# Patient Record
Sex: FEMALE | Employment: OTHER | URBAN - METROPOLITAN AREA
[De-identification: names, ages, dates, MRNs, and addresses within clinical notes are randomized per-mention and may not be internally consistent; named-entity substitution may affect disease eponyms.]

---

## 2017-01-16 ENCOUNTER — OFFICE VISIT (OUTPATIENT)
Dept: ORTHOPEDIC SURGERY | Age: 82
End: 2017-01-16

## 2017-01-16 VITALS
HEIGHT: 56 IN | HEART RATE: 77 BPM | DIASTOLIC BLOOD PRESSURE: 50 MMHG | SYSTOLIC BLOOD PRESSURE: 134 MMHG | WEIGHT: 128.2 LBS | BODY MASS INDEX: 28.84 KG/M2 | RESPIRATION RATE: 18 BRPM

## 2017-01-16 DIAGNOSIS — M47.812 SPONDYLOSIS WITHOUT MYELOPATHY OR RADICULOPATHY, CERVICAL REGION: ICD-10-CM

## 2017-01-16 DIAGNOSIS — M51.24 OTHER INTERVERTEBRAL DISC DISPLACEMENT, THORACIC REGION: ICD-10-CM

## 2017-01-16 DIAGNOSIS — M50.20 OTHER CERVICAL DISC DISPLACEMENT, UNSPECIFIED CERVICAL REGION: Primary | ICD-10-CM

## 2017-01-16 DIAGNOSIS — M51.26 OTHER INTERVERTEBRAL DISC DISPLACEMENT, LUMBAR REGION: ICD-10-CM

## 2017-01-16 RX ORDER — NITROFURANTOIN 25; 75 MG/1; MG/1
CAPSULE ORAL
Refills: 0 | COMMUNITY
Start: 2016-11-07 | End: 2017-04-12 | Stop reason: ALTCHOICE

## 2017-01-16 RX ORDER — NAPROXEN SODIUM 220 MG
220 TABLET ORAL 2 TIMES DAILY WITH MEALS
COMMUNITY
End: 2017-02-01

## 2017-01-16 RX ORDER — PHENAZOPYRIDINE HYDROCHLORIDE 100 MG/1
TABLET, FILM COATED ORAL
Refills: 0 | COMMUNITY
Start: 2016-10-25 | End: 2017-02-01

## 2017-01-16 RX ORDER — CONJUGATED ESTROGENS 0.62 MG/G
CREAM VAGINAL
Refills: 0 | COMMUNITY
Start: 2016-10-25 | End: 2018-12-03 | Stop reason: SDUPTHER

## 2017-01-16 RX ORDER — DULOXETIN HYDROCHLORIDE 20 MG/1
20 CAPSULE, DELAYED RELEASE ORAL DAILY
Qty: 30 CAP | Refills: 1 | Status: SHIPPED | OUTPATIENT
Start: 2017-01-16 | End: 2017-02-01

## 2017-01-16 RX ORDER — AMOXICILLIN AND CLAVULANATE POTASSIUM 500; 125 MG/1; MG/1
TABLET, FILM COATED ORAL
Refills: 0 | COMMUNITY
Start: 2016-12-21 | End: 2018-12-03

## 2017-01-16 NOTE — MR AVS SNAPSHOT
Visit Information Date & Time Provider Department Dept. Phone Encounter #  
 1/16/2017  1:00 PM Frances Montalvo MD [address] [facility] [place] [phone] [id number] Follow-up Instructions Return in about 4 weeks (around 2/13/2017). Your Appointments 2/1/2017 12:45 PM  
Office Visit with MD RUDOLPH [facility]) Appt Note: 3 month f/u  
 [address]  
  
   
 [address] [facility] Upcoming Health Maintenance Date Due DTaP/Tdap/Td series (1 - Tdap) 4/8/1947 ZOSTER VACCINE AGE 60> 4/8/1986 GLAUCOMA SCREENING Q2Y 4/8/1991 OSTEOPOROSIS SCREENING (DEXA) 4/8/1991 Pneumococcal 65+ Low/Medium Risk (1 of 2 - PCV13) 4/8/1991 MEDICARE YEARLY EXAM 4/8/1991 Allergies as of 1/16/2017  Review Complete On: 1/16/2017 By: Frances Montalvo MD  
  
 Severity Noted Reaction Type Reactions Novocain [Procaine]  07/21/2016    Swelling Sulfa (Sulfonamide Antibiotics)  07/21/2016    Rash Current Immunizations  Never Reviewed Name Date Influenza High Dose Vaccine PF 10/7/2016 Not reviewed this visit You Were Diagnosed With   
  
 Codes Comments Other cervical disc displacement, unspecified cervical region    -  Primary ICD-10-CM: M50.20 ICD-9-CM: 722.0 Other intervertebral disc displacement, thoracic region     ICD-10-CM: M51.24 
ICD-9-CM: 722.11 Other intervertebral disc displacement, lumbar region     ICD-10-CM: M51.26 
ICD-9-CM: 722.10 Spondylosis without myelopathy or radiculopathy, cervical region     ICD-10-CM: M47.812 ICD-9-CM: 721.0 Vitals BP Pulse Resp Height(growth percentile) Weight(growth percentile) BMI  
 134/50 (BP 1 Location: Left arm, BP Patient Position: Sitting) 77 18 4' 8\" (1.422 m) 128 lb 3.2 oz (58.2 kg) 28.74 kg/m2 Smoking Status Former Smoker BMI and BSA Data Body Mass Index Body Surface Area 28.74 kg/m 2 1.52 m 2 Preferred Pharmacy Pharmacy Name Phone [facility]Y #[id number] Juan Jose Gomes, [address] [facility] [facility] [phone] Your Updated Medication List  
  
   
This list is accurate as of: 1/16/17  2:02 PM.  Always use your most recent med list.  
  
  
  
  
 ALEVE 220 mg tablet Generic drug:  naproxen sodium Take 220 mg by mouth two (2) times daily (with meals). amoxicillin-clavulanate 500-125 mg per tablet Commonly known as:  AUGMENTIN  
TAKE 1/2 TABLET BY MOUTH ONCE A DAY AS DIRECTED FOR 90 DAYS. aspirin delayed-release 81 mg tablet Take  by mouth daily. CALTRATE 600 PO Take  by mouth. carvedilol 3.125 mg tablet Commonly known as:  Gillermina Begun Take 3.125 mg by mouth two (2) times daily (with meals). CENTRUM SILVER PO Take  by mouth. clopidogrel 75 mg Tab Commonly known as:  PLAVIX Take 75 mg by mouth daily. DULoxetine 20 mg capsule Commonly known as:  CYMBALTA Take 1 Cap by mouth daily. DYMISTA 137-50 mcg/spray [place] Generic drug:  azelastine-fluticasone INSTILL 1 SPRAY TO EACH NOSTRIL TWICE A DAY  
  
 FIBERCON PO Take  by mouth. FLUZONE HIGH-DOSE 2016-17 (PF) Syrg injection Generic drug:  influenza vaccine 2016-17 (65 yr+)(PF)  
TO BE ADMINISTERED BY PHARMACIST FOR IMMUNIZATION NexIUM 40 mg capsule Generic drug:  esomeprazole Take 40 mg by mouth daily. nitrofurantoin (macrocrystal-monohydrate) 100 mg capsule Commonly known as:  MACROBID  
TAKE 1 TABLET BY MOUTH TWICE A DAY FOR 7 DAYS. NITROSTAT 0.4 mg SL tablet Generic drug:  nitroglycerin 0.4 mg by SubLINGual route every five (5) minutes as needed. phenazopyridine 100 mg tablet Commonly known as:  PYRIDIUM  
TAKE 1 TABLET BY MOUTH ONCE A DAY AS NEEDED FOR PAIN  
  
 pravastatin 40 mg tablet Commonly known as:  PRAVACHOL  
 Take 40 mg by mouth nightly. PREMARIN 0.625 mg/gram vaginal cream  
Generic drug:  conjugated estrogens APPLY A SMALL AMOUNT TO AFFECTED AREA EVERY NIGHT AS DIRECTED PROBIOTIC PO Take  by mouth daily. trimethoprim 100 mg tablet Commonly known as:  TRIMPEX TAKE 1 TABLET BY MOUTH ONCE A DAY FOR 60 DAYS. trospium 60 mg capsule Commonly known as:  SANCTURA XL Take 60 mg by mouth Daily (before breakfast). Prescriptions Sent to Pharmacy Refills DULoxetine (CYMBALTA) 20 mg capsule 1 Sig: Take 1 Cap by mouth daily. Class: Normal  
 [facility] [address] [place] [address],[address] #: [phone] Route: Oral  
  
We Performed the Following REFERRAL TO PHYSICAL THERAPY [SXU40 Custom] Comments: EVAL AND TREAT 
THE CROSSINGS AT [facility] 
CERVICAL 
LUMBAR 
THORACIC 
2-3 VISIT/WEEK X 4 WEEKS Follow-up Instructions Return in about 4 weeks (around 2/13/2017). Referral Information Referral ID Referred By Referred To  
  
 [id number] MICHELLE AYALA III Not Available Visits Status Start Date End Date 1 New Request 1/16/17 1/16/18 If your referral has a status of pending review or denied, additional information will be sent to support the outcome of this decision. Introducing [facility]! [facility] introduces Tinkercad patient portal. Now you can access parts of your medical record, email your doctor's office, and request medication refills online. 1. In your internet browser, go to [email or web address] 2. Click on the First Time User? Click Here link in the Sign In box. You will see the New Member Sign Up page. 3. Enter your Tinkercad Access Code exactly as it appears below. You will not need to use this code after youve completed the sign-up process. If you do not sign up before the expiration date, you must request a new code. · CIBDO Access Code: [email or web address] Expires: 1/30/2017 10:43 AM 
 
4. Enter the last four digits of your Social Security Number (xxxx) and Date of Birth (mm/dd/yyyy) as indicated and click Submit. You will be taken to the next sign-up page. 5. Create a CIBDO ID. This will be your CIBDO login ID and cannot be changed, so think of one that is secure and easy to remember. 6. Create a CIBDO password. You can change your password at any time. 7. Enter your Password Reset Question and Answer. This can be used at a later time if you forget your password. 8. Enter your e-mail address. You will receive e-mail notification when new information is available in [address] 9. Click Sign Up. You can now view and download portions of your medical record. 10. Click the Download Summary menu link to download a portable copy of your medical information. If you have questions, please visit the Frequently Asked Questions section of the CIBDO website. Remember, CIBDO is NOT to be used for urgent needs. For medical emergencies, dial 911. Now available from your iPhone and Android! Please provide this summary of care documentation to your next provider. Your primary care clinician is listed as Raul De. If you have any questions after today's visit, please call [phone].

## 2017-01-16 NOTE — PROGRESS NOTES
[facility]  [address]  Phone: [phone]  Fax: [phone]        PROGRESS NOTE      HISTORY OF PRESENT ILLNESS:  The patient is a 80 y.o. female and was seen today for follow up of neck and back pain. She arrives in rolling walker with seat. She reports a 10 year history of back pain and attributes this to arthritis. She states her pain has been progressively worsening. She also endorses paresthesias in bilateral hands with possible radiculopathy into left hand and fingers. She reports the lumbar back pain extends down the lateral aspect of her left leg to her foot in an S1 distribution. She states her thoracic back pain is the worst. Her pain is worse with movement and better with rest, which is consistent with spinal stenosis. She has received trigger point injections in [place], [place] previously with mild improvement. She has tried TRAMADOL previously and states this made her drowsy and foggy-headed. She also endorses bowel and bladder incontinence that has been present for many years. She has been diagnosed with carpal tunnel syndrome in bilateral hands and will drop objects at times. Pt denies foot drop, fever, weight loss, or skin changes. She denies epidural steroid injections and back surgery. She also denies a history of diabetes. Of note, she relocated from [place], [place]. MRI C-spine dated 4/6/15 was reviewed. Per report; stable anterolisthesis at C5-6 with bulging disc and uncovertebral joint and facet hypertrophy toward the left with moderate foraminal narrowing on the left. No change compared to 2/12/15. Stable desiccated degenerative changes and partial fusion at C6-7 with severe foraminal narrowing bilaterally. No changes. Stable desiccated bulging disc with uncovertebral joint and facet hypertrophy toward the right at C4-5 with severe foraminal narrowing on the right. No change since 2/12/15.  Stable bulging disc with uncovertebral joint and facet hypertrophy at the left C3-4 with moderate foraminal narrowing on the left. No change since 2/12/15. MRI T-spine dated 12/3/15 was reviewed. Per report: T7-8 focal disc herniation with extrusion and mild canal stenosis without cord compression. Mild thoracic spondylosis with mild scoliotic curvature. There is no fracture identified. There is prominent appearance of the central canal within the cord identified with subtle syrinx formation identified as noted form T6 through T8. There is no fracture identified. MRI L-spine dated 12/3/15. Per report, lumbar spondylosis and multilevel disc bulges and changes worst at L4-5 with severe canal stenosis and recess narrowing with foraminal narrowing. Disc bulge at L2-3 with moderate to severe canal stenosis and recess narrowing with foraminal stenosis. L3-4 disc bulge and facet hypertrophy with moderate canal stenosis and recess narrowing with foraminal narrowing identified as described above. L5-S1 disc bulge and anterolisthesis secondary to facet disc with foraminal narrowing identified, bilaterally, worse towards the left. There is mild canal stenosis. At her last clinical appointment, due to her age, she would be a high-risk surgical candidate I set the patient up for a short course of physical therapy, with an emphasis on HEP. I recommended she maintain her HEP daily after completing physical therapy. I started the patient on 100 mg Neurontin TID. I also started her on Medrol Dosepak. The patient returns today with neck pain into the RUE and low back pain into the BLE. She rates pain 4/10, an improvement since her last visit (7/10). Patient is accompanied by her daughter today. I last saw patient on 7/21/16 and was scheduled to follow up in one month's time but failed to do so until today. She reports cardiac stent implant. Patient is prescribed Plavix.  Her daughter states following the procedure, she had a lump appear at the proximal RUE which has been causing pain throughout the arm. Patient continues to endorse bilateral hand paraesthesias which has progressed. Noted, she has been diagnosed with CTS of the bilateral hands and underwent surgery on the left hand. Patient reports intolerance to NEURONTIN due to lethargy and subsequently d/c'd medication. Therapy notes reviewed. Patient completed physical therapy with benefit and admits performing her HEP 2-3x/week. Her daughter states she has been taking Aleve prn for pain relief. She denies hx of glaucoma.  reviewed. Past Medical History   Diagnosis Date    Arthritis     CAD (coronary artery disease)     Cancer ([facility] [address]      breast and colon    GERD (gastroesophageal reflux disease)     High cholesterol     Hypertension     Urinary incontinence         Social History     Social History    Marital status:      Spouse name: N/A    Number of children: N/A    Years of education: N/A     Occupational History    retired      Social History Main Topics    Smoking status: Former Smoker     Types: Cigarettes     Quit date: 7/21/1983    Smokeless tobacco: Never Used    Alcohol use Yes      Comment: rare    Drug use: Not on file    Sexual activity: Not on file     Other Topics Concern    Not on file     Social History Narrative       Current Outpatient Prescriptions   Medication Sig Dispense Refill    PREMARIN 0.625 mg/gram vaginal cream APPLY A SMALL AMOUNT TO AFFECTED AREA EVERY NIGHT AS DIRECTED  0    amoxicillin-clavulanate (AUGMENTIN) 500-125 mg per tablet TAKE 1/2 TABLET BY MOUTH ONCE A DAY AS DIRECTED FOR 90 DAYS.  0    naproxen sodium (ALEVE) 220 mg tablet Take 220 mg by mouth two (2) times daily (with meals).  DULoxetine (CYMBALTA) 20 mg capsule Take 1 Cap by mouth daily. 30 Cap 1    clopidogrel (PLAVIX) 75 mg tablet Take 75 mg by mouth daily.       DYMISTA 137-50 mcg/spray spry INSTILL 1 SPRAY TO EACH NOSTRIL TWICE A DAY  12    FOLIC ACID/MULTIVIT-MIN/LUTEIN (CENTRUM SILVER PO) Take  by mouth.  trospium (SANCTURA XL) 60 mg capsule Take 60 mg by mouth Daily (before breakfast).  CALCIUM POLYCARBOPHIL (FIBERCON PO) Take  by mouth.  pravastatin (PRAVACHOL) 40 mg tablet Take 40 mg by mouth nightly.  carvedilol (COREG) 3.125 mg tablet Take 3.125 mg by mouth two (2) times daily (with meals). 0    aspirin delayed-release 81 mg tablet Take  by mouth daily.  LACTOBACILLUS ACIDOPHILUS (PROBIOTIC PO) Take  by mouth daily.  phenazopyridine (PYRIDIUM) 100 mg tablet TAKE 1 TABLET BY MOUTH ONCE A DAY AS NEEDED FOR PAIN  0    nitrofurantoin, macrocrystal-monohydrate, (MACROBID) 100 mg capsule TAKE 1 TABLET BY MOUTH TWICE A DAY FOR 7 DAYS.  0    FLUZONE HIGH-DOSE 2016-17, PF, syrg injection TO BE ADMINISTERED BY PHARMACIST FOR IMMUNIZATION  0    CALCIUM CARBONATE (CALTRATE 600 PO) Take  by mouth.  trimethoprim (TRIMPEX) 100 mg tablet TAKE 1 TABLET BY MOUTH ONCE A DAY FOR 60 DAYS.  0    NEXIUM 40 mg capsule Take 40 mg by mouth daily.  NITROSTAT 0.4 mg SL tablet 0.4 mg by SubLINGual route every five (5) minutes as needed. Allergies   Allergen Reactions    Novocain [Procaine] Swelling    Sulfa (Sulfonamide Antibiotics) Rash        Mobilizes with rollator. PHYSICAL EXAMINATION    Visit Vitals    /50 (BP 1 Location: Left arm, BP Patient Position: Sitting)    Pulse 77    Resp 18    Ht 4' 8\" (1.422 m)    Wt 128 lb 3.2 oz (58.2 kg)    BMI 28.74 kg/m2       CONSTITUTIONAL: NAD, A&O x 3  SENSATION: Intact to light touch throughout  NEURO: Tom's is negative bilaterally. RANGE OF MOTIONegativeN: The patient has full passive range of motion in all four extremities.   MOTOR:  Straight Leg Raise: , bilateral     Shoulder AB/Flex Elbow Flex Wrist Ext Elbow Ext Wrist Flex Hand Intrin Tone   Right +4/5 +4/5 +4/5 +4/5 +4/5 +4/5 +4/5   Left +4/5 +4/5 +4/5 +4/5 +4/5 +4/5 +4/5              Hip Flex Knee Ext Knee Flex Ankle DF GTE Ankle PF Tone   Right +4/5 +4/5 +4/5 +4/5 +4/5 +4/5 +4/5   Left +4/5 +4/5 +4/5 +4/5 +4/5 +4/5 +4/5       ASSESSMENT   Wesly Farley was seen today for neck pain, back pain, arm pain and leg pain. Diagnoses and all orders for this visit:    Other cervical disc displacement, unspecified cervical region  -     REFERRAL TO PHYSICAL THERAPY  -     DULoxetine (CYMBALTA) 20 mg capsule; Take 1 Cap by mouth daily. Other intervertebral disc displacement, thoracic region  -     REFERRAL TO PHYSICAL THERAPY  -     DULoxetine (CYMBALTA) 20 mg capsule; Take 1 Cap by mouth daily. Other intervertebral disc displacement, lumbar region  -     REFERRAL TO PHYSICAL THERAPY  -     DULoxetine (CYMBALTA) 20 mg capsule; Take 1 Cap by mouth daily. Spondylosis without myelopathy or radiculopathy, cervical region  -     REFERRAL TO PHYSICAL THERAPY  -     DULoxetine (CYMBALTA) 20 mg capsule; Take 1 Cap by mouth daily. IMPRESSION AND PLAN:  The patient reports intolerance to Neurontin. I will try her on Cymbalta 20 mg per day. The risks, benefits, and potential side effects of this medication were discussed. Patient understands and wished to proceed. Patient advised to call the office if intolerant to new medication. Per patient, I will refer her back to physical therapy with an emphasis on HEP. I advised against use of Aleve as she is also taking Plavix and recommended Tylenol prn. I will see the patient back in 1 month's time or earlier if needed. Written by Payton Abdul, as dictated by Jorje Guaman MD  I examined the patient, reviewed and agree with the note.

## 2017-02-01 ENCOUNTER — OFFICE VISIT (OUTPATIENT)
Dept: INTERNAL MEDICINE CLINIC | Age: 82
End: 2017-02-01

## 2017-02-01 VITALS
WEIGHT: 128 LBS | HEIGHT: 56 IN | TEMPERATURE: 98.4 F | RESPIRATION RATE: 15 BRPM | DIASTOLIC BLOOD PRESSURE: 53 MMHG | OXYGEN SATURATION: 96 % | HEART RATE: 77 BPM | SYSTOLIC BLOOD PRESSURE: 125 MMHG | BODY MASS INDEX: 28.79 KG/M2

## 2017-02-01 DIAGNOSIS — M51.37 DDD (DEGENERATIVE DISC DISEASE), LUMBOSACRAL: ICD-10-CM

## 2017-02-01 DIAGNOSIS — L98.9 SKIN LESION OF FACE: ICD-10-CM

## 2017-02-01 DIAGNOSIS — R41.89 SUBJECTIVE MEMORY COMPLAINTS: Primary | ICD-10-CM

## 2017-02-01 DIAGNOSIS — R06.2 WHEEZING: ICD-10-CM

## 2017-02-01 RX ORDER — ALBUTEROL SULFATE 90 UG/1
2 AEROSOL, METERED RESPIRATORY (INHALATION)
Qty: 1 INHALER | Refills: 3 | Status: SHIPPED | OUTPATIENT
Start: 2017-02-01 | End: 2020-02-07 | Stop reason: SDUPTHER

## 2017-02-01 NOTE — PROGRESS NOTES
HISTORY OF PRESENT ILLNESS  Cynthia Schneider is a 80 y.o. female. HPI Comments: 79 yo female here for f/u of DDD, CAD, subjective memory loss. She has been seen by spine surgeon. Cymbalta recommended but she decided not to try. Continuing with PT for now which she likes. CAD: Occasional nonexertional CP which resolves without intervention. Has f/u planned with cardiology in [facility]. She notes she has more difficulty remembering names than in the past. Daughter does not feel memory is an issue. Maybe some confusion since her son started paying her bills online rather than pt balancing her check book. She notes lesion L forehead. Daughter reports she had been using steroid cream with improvement of lesion which is now barely visible. Review of Systems   Constitutional: Negative for chills, fever and weight loss. HENT: Negative for congestion and ear discharge ( itching). Eyes: Negative for blurred vision and pain. Respiratory: Positive for shortness of breath (if overexerting) and wheezing. Negative for cough. Cardiovascular: Positive for chest pain. Negative for palpitations and leg swelling. Gastrointestinal: Negative for heartburn, nausea and vomiting. Genitourinary: Negative for frequency and urgency. Musculoskeletal: Negative for joint pain and myalgias. Skin: Positive for itching (lesion on forehead). Negative for rash. Neurological: Negative for dizziness, tingling and headaches. Psychiatric/Behavioral: Negative for depression. The patient is not nervous/anxious.       Past Medical History   Diagnosis Date    Arthritis     CAD (coronary artery disease)     Cancer ([facility] [address]      breast and colon    GERD (gastroesophageal reflux disease)     High cholesterol     Hypertension     Urinary incontinence      Current Outpatient Prescriptions on File Prior to Visit   Medication Sig Dispense Refill    phenazopyridine (PYRIDIUM) 100 mg tablet TAKE 1 TABLET BY MOUTH ONCE A DAY AS NEEDED FOR PAIN  0    nitrofurantoin, macrocrystal-monohydrate, (MACROBID) 100 mg capsule TAKE 1 TABLET BY MOUTH TWICE A DAY FOR 7 DAYS.  0    PREMARIN 0.625 mg/gram vaginal cream APPLY A SMALL AMOUNT TO AFFECTED AREA EVERY NIGHT AS DIRECTED  0    amoxicillin-clavulanate (AUGMENTIN) 500-125 mg per tablet TAKE 1/2 TABLET BY MOUTH ONCE A DAY AS DIRECTED FOR 90 DAYS.  0    naproxen sodium (ALEVE) 220 mg tablet Take 220 mg by mouth two (2) times daily (with meals).  DULoxetine (CYMBALTA) 20 mg capsule Take 1 Cap by mouth daily. 30 Cap 1    clopidogrel (PLAVIX) 75 mg tablet Take 75 mg by mouth daily.  FLUZONE HIGH-DOSE 2016-17, PF, syrg injection TO BE ADMINISTERED BY PHARMACIST FOR IMMUNIZATION  0    DYMISTA 137-50 mcg/spray spry INSTILL 1 SPRAY TO EACH NOSTRIL TWICE A DAY  12    FOLIC ACID/MULTIVIT-MIN/LUTEIN (CENTRUM SILVER PO) Take  by mouth.  trospium (SANCTURA XL) 60 mg capsule Take 60 mg by mouth Daily (before breakfast).  CALCIUM CARBONATE (CALTRATE 600 PO) Take  by mouth.  CALCIUM POLYCARBOPHIL (FIBERCON PO) Take  by mouth.  trimethoprim (TRIMPEX) 100 mg tablet TAKE 1 TABLET BY MOUTH ONCE A DAY FOR 60 DAYS.  0    pravastatin (PRAVACHOL) 40 mg tablet Take 40 mg by mouth nightly.  carvedilol (COREG) 3.125 mg tablet Take 3.125 mg by mouth two (2) times daily (with meals). 0    aspirin delayed-release 81 mg tablet Take  by mouth daily.  NEXIUM 40 mg capsule Take 40 mg by mouth daily.  LACTOBACILLUS ACIDOPHILUS (PROBIOTIC PO) Take  by mouth daily.  NITROSTAT 0.4 mg SL tablet 0.4 mg by SubLINGual route every five (5) minutes as needed. No current facility-administered medications on file prior to visit. Physical Exam   Constitutional: She appears well-developed and well-nourished. No distress.    /53  Pulse 77  Temp 98.4 °F (36.9 °C) (Oral)   Resp 15  Ht 4' 8\" (1.422 m)  Wt 128 lb (58.1 kg)  SpO2 96%  BMI 28.7 kg/m2     HENT:   Right Ear: External ear normal.   Left Ear: External ear normal.   Eyes: EOM are normal. Right eye exhibits no discharge. Left eye exhibits no discharge. No scleral icterus. Neck: Neck supple. Cardiovascular: Normal rate, regular rhythm and normal heart sounds. Exam reveals no gallop and no friction rub. No murmur heard. Pulmonary/Chest: Effort normal. No respiratory distress. She has wheezes. She has no rales. Musculoskeletal: She exhibits no edema or tenderness. Lymphadenopathy:     She has no cervical adenopathy. Neurological: She is alert. She exhibits normal muscle tone. MMSE 25/30   Skin: Skin is warm and dry. No rash noted. Psychiatric: She has a normal mood and affect. ASSESSMENT and PLAN    ICD-10-CM ICD-9-CM    1. Subjective memory complaints R41.89 780.93    2. DDD (degenerative disc disease), lumbosacral M51.37 722.52    3. Skin lesion of face L98.9 709.9    4. Wheezing R06.2 786.07    MMSE okay for age. Discussed MCI. No need for medication at this time. Discussed memory strategies. Will try albuterol prn for wheezing. No suspicious skin lesions; will monitor for now.    Continue with PT.

## 2017-02-01 NOTE — PROGRESS NOTES
ROOM # 16    Matias Ramirez presents today for   Chief Complaint   Patient presents with    Hypertension       Matias Ramirez preferred language for health care discussion is english/other. Is someone accompanying this pt? Yes pt daughter    Is the patient using any DME equipment during OV? Yes rollator    Depression Screening:  PHQ 2 / 9, over the last two weeks 11/1/2016   Little interest or pleasure in doing things More than half the days   Feeling down, depressed or hopeless Not at all   Total Score PHQ 2 2       Learning Assessment:  No flowsheet data found. Abuse Screening:  No flowsheet data found. Fall Risk  Fall Risk Assessment, last 12 mths 11/1/2016 7/21/2016   Able to walk? Yes Yes   Fall in past 12 months? Yes No   Fall with injury? No -   Number of falls in past 12 months 1 -   Fall Risk Score 1 -       Health Maintenance reviewed and discussed per provider. Yes    Matias Ramirez is due for pneumo vaccine. Please order/place referral if appropriate. Advance Directive:  1. Do you have an advance directive in place? Patient Reply: yes    2. If not, would you like material regarding how to put one in place? Patient Reply: no    Coordination of Care:  1. Have you been to the ER, urgent care clinic since your last visit? Hospitalized since your last visit? no    2. Have you seen or consulted any other health care providers outside of the [address] [place] [address] since your last visit? Include any pap smears or colon screening.  no

## 2017-02-01 NOTE — MR AVS SNAPSHOT
Visit Information Date & Time Provider Department Dept. Phone Encounter #  
 2/1/2017 12:45 PM Lilly Ross Sosedi [phone] [id number] Your Appointments 2/23/2017  9:45 AM  
Follow Up with Jean Marie Joy MD  
[facility] Appt Note: fu for upper and lower back pain [address] [facility] [id number] [phone]  
  
   
 [address] [facility] [address] Upcoming Health Maintenance Date Due DTaP/Tdap/Td series (1 - Tdap) 4/8/1947 ZOSTER VACCINE AGE 60> 4/8/1986 GLAUCOMA SCREENING Q2Y 4/8/1991 Pneumococcal 65+ Low/Medium Risk (1 of 2 - PCV13) 4/8/1991 MEDICARE YEARLY EXAM 5/1/2017* OSTEOPOROSIS SCREENING (DEXA) 1/1/2018* *Topic was postponed. The date shown is not the original due date. Allergies as of 2/1/2017  Review Complete On: 2/1/2017 By: Guillermo Nice LPN Severity Noted Reaction Type Reactions Novocain [Procaine]  07/21/2016    Swelling Sulfa (Sulfonamide Antibiotics)  07/21/2016    Rash Current Immunizations  Never Reviewed Name Date Influenza High Dose Vaccine PF 10/7/2016 Not reviewed this visit You Were Diagnosed With   
  
 Codes Comments Subjective memory complaints    -  Primary ICD-10-CM: R41.89 ICD-9-CM: 780.93   
 DDD (degenerative disc disease), lumbosacral     ICD-10-CM: M51.37 
ICD-9-CM: 722.52 Skin lesion of face     ICD-10-CM: L98.9 ICD-9-CM: 496. 9 Wheezing     ICD-10-CM: R06.2 ICD-9-CM: 786.07 Vitals BP Pulse Temp Resp Height(growth percentile) Weight(growth percentile) 125/53 77 98.4 °F (36.9 °C) (Oral) 15 4' 8\" (1.422 m) 128 lb (58.1 kg) SpO2 BMI OB Status Smoking Status 96% 28.7 kg/m2 Postmenopausal Former Smoker BMI and BSA Data Body Mass Index Body Surface Area  28.7 kg/m 2 1.52 m 2  
  
  
 Preferred Pharmacy Pharmacy Name Phone [id number] Kaylee Bailey, [address] [facility],[address] [facility] [phone] Your Updated Medication List  
  
   
This list is accurate as of: 2/1/17  1:29 PM.  Always use your most recent med list.  
  
  
  
  
 albuterol 90 mcg/actuation inhaler Commonly known as:  PROVENTIL HFA, VENTOLIN HFA, PROAIR HFA Take 2 Puffs by inhalation every six (6) hours as needed for Wheezing. amoxicillin-clavulanate 500-125 mg per tablet Commonly known as:  AUGMENTIN  
TAKE 1/2 TABLET BY MOUTH ONCE A DAY AS DIRECTED FOR 90 DAYS. aspirin delayed-release 81 mg tablet Take  by mouth daily. carvedilol 3.125 mg tablet Commonly known as:  Ben Been Take 3.125 mg by mouth two (2) times daily (with meals). CENTRUM SILVER PO Take  by mouth. clopidogrel 75 mg Tab Commonly known as:  PLAVIX Take 75 mg by mouth daily. DYMISTA 137-50 mcg/spray [place] Generic drug:  azelastine-fluticasone INSTILL 1 SPRAY TO EACH NOSTRIL TWICE A DAY FLUZONE HIGH-DOSE 2016-17 (PF) Syrg injection Generic drug:  influenza vaccine 2016-17 (65 yr+)(PF)  
TO BE ADMINISTERED BY PHARMACIST FOR IMMUNIZATION  
  
 inhalational spacing device Use as directed with inhaler NexIUM 40 mg capsule Generic drug:  esomeprazole Take 40 mg by mouth daily. nitrofurantoin (macrocrystal-monohydrate) 100 mg capsule Commonly known as:  MACROBID  
TAKE 1 TABLET BY MOUTH TWICE A DAY FOR 7 DAYS. NITROSTAT 0.4 mg SL tablet Generic drug:  nitroglycerin 0.4 mg by SubLINGual route every five (5) minutes as needed. pravastatin 40 mg tablet Commonly known as:  PRAVACHOL Take 40 mg by mouth nightly. PREMARIN 0.625 mg/gram vaginal cream  
Generic drug:  conjugated estrogens APPLY A SMALL AMOUNT TO AFFECTED AREA EVERY NIGHT AS DIRECTED PROBIOTIC PO Take  by mouth daily. trospium 60 mg capsule Commonly known as:  SANCTURA XL  
 Take 60 mg by mouth Daily (before breakfast). Prescriptions Sent to Pharmacy Refills  
 albuterol (PROVENTIL HFA, VENTOLIN HFA, PROAIR HFA) 90 mcg/actuation inhaler 3 Sig: Take 2 Puffs by inhalation every six (6) hours as needed for Wheezing. Class: Normal  
 Pharmacy: [facility] [address] [place] [address], [address] [place] [address],[address] [place] [address] Ph #: [phone] Route: Inhalation  
 inhalational spacing device 0 Sig: Use as directed with inhaler Class: Normal  
 Pharmacy: [facility] [address] [place] [address], [address] [place] [address],[address] [place] [address] Ph #: [phone] Patient Instructions Wheezing or Bronchoconstriction: Care Instructions Your Care Instructions Wheezing is a whistling noise made during breathing. It occurs when the small airways, or bronchial tubes, that lead to your lungs swell or contract (spasm) and become narrow. This narrowing is called bronchoconstriction. When your airways constrict, it is hard for air to pass through and this makes it hard for you to breathe. Wheezing and bronchoconstriction can be caused by many problems, including: · An infection such as the flu or a cold. · Allergies such as hay fever. · Diseases such as asthma or chronic obstructive pulmonary disease. · Smoking. Treatment for your wheezing depends on what is causing the problem. Your wheezing may get better without treatment. But you may need to pay attention to things that cause your wheezing and avoid them. Or you may need medicine to help treat the wheezing and to reduce the swelling or to relieve spasms in your lungs. Follow-up care is a key part of your treatment and safety. Be sure to make and go to all appointments, and call your doctor if you are having problems. It is also a good idea to know your test results and keep a list of the medicines you take. How can you care for yourself at home? · Take your medicine exactly as prescribed.  Call your doctor if you think you are having a problem with your medicine. You will get more details on the specific medicine your doctor prescribes. · If your doctor prescribed antibiotics, take them as directed. Do not stop taking them just because you feel better. You need to take the full course of antibiotics. · Breathe moist air from a humidifier, hot shower, or sink filled with hot water. This may help ease your symptoms and make it easier for you to breathe. · If you have congestion in your nose and throat, drinking plenty of fluids, especially hot fluids, may help relieve your symptoms. If you have kidney, heart, or liver disease and have to limit fluids, talk with your doctor before you increase the amount of fluids you drink. · If you have mucus in your airways, it may help to breathe deeply and cough. · Do not smoke or allow others to smoke around you. Smoking can make your wheezing worse. If you need help quitting, talk to your doctor about stop-smoking programs and medicines. These can increase your chances of quitting for good. · Avoid things that may cause your wheezing. These may include colds, smoke, air pollution, dust, pollen, pets, cockroaches, stress, and cold air. When should you call for help? Call 911 anytime you think you may need emergency care. For example, call if: 
· You have severe trouble breathing. · You passed out (lost consciousness). Call your doctor now or seek immediate medical care if: 
· You cough up yellow, dark brown, or bloody mucus (sputum). · You have new or worse shortness of breath. · Your wheezing is not getting better or it gets worse after you start taking your medicine. Watch closely for changes in your health, and be sure to contact your doctor if: 
· You do not get better as expected. Where can you learn more? Go to http://radha-abdiaziz.info/. Enter 411 [id number] in the search box to learn more about \"Wheezing or Bronchoconstriction: Care Instructions. \" 
 Current as of: May 23, 2016 Content Version: 11.1 © 6035-7538 [employer], [employer] Care instructions adapted under license by [employer] (which disclaims liability or warranty for this information). If you have questions about a medical condition or this instruction, always ask your healthcare professional. [address] any warranty or liability for your use of this information. Introducing [facility] [place] [facility]! [facility] introduces DokDok patient portal. Now you can access parts of your medical record, email your doctor's office, and request medication refills online. 1. In your internet browser, go to [email or web address] 2. Click on the First Time User? Click Here link in the Sign In box. You will see the New Member Sign Up page. 3. Enter your DokDok Access Code exactly as it appears below. You will not need to use this code after youve completed the sign-up process. If you do not sign up before the expiration date, you must request a new code. · DokDok Access Code: [email or web address] Expires: 5/2/2017  1:29 PM 
 
4. Enter the last four digits of your Social Security Number (xxxx) and Date of Birth (mm/dd/yyyy) as indicated and click Submit. You will be taken to the next sign-up page. 5. Create a DokDok ID. This will be your DokDok login ID and cannot be changed, so think of one that is secure and easy to remember. 6. Create a DokDok password. You can change your password at any time. 7. Enter your Password Reset Question and Answer. This can be used at a later time if you forget your password. 8. Enter your e-mail address. You will receive e-mail notification when new information is available in [address] 9. Click Sign Up. You can now view and download portions of your medical record. 10. Click the Download Summary menu link to download a portable copy of your medical information. If you have questions, please visit the Frequently Asked Questions section of the [employer] website. Remember, [employer] is NOT to be used for urgent needs. For medical emergencies, dial 911. Now available from your iPhone and Android! Please provide this summary of care documentation to your next provider. Your primary care clinician is listed as Raul De. If you have any questions after today's visit, please call [phone].

## 2017-02-01 NOTE — PATIENT INSTRUCTIONS
Wheezing or Bronchoconstriction: Care Instructions  Your Care Instructions  Wheezing is a whistling noise made during breathing. It occurs when the small airways, or bronchial tubes, that lead to your lungs swell or contract (spasm) and become narrow. This narrowing is called bronchoconstriction. When your airways constrict, it is hard for air to pass through and this makes it hard for you to breathe. Wheezing and bronchoconstriction can be caused by many problems, including:  · An infection such as the flu or a cold. · Allergies such as hay fever. · Diseases such as asthma or chronic obstructive pulmonary disease. · Smoking. Treatment for your wheezing depends on what is causing the problem. Your wheezing may get better without treatment. But you may need to pay attention to things that cause your wheezing and avoid them. Or you may need medicine to help treat the wheezing and to reduce the swelling or to relieve spasms in your lungs. Follow-up care is a key part of your treatment and safety. Be sure to make and go to all appointments, and call your doctor if you are having problems. It is also a good idea to know your test results and keep a list of the medicines you take. How can you care for yourself at home? · Take your medicine exactly as prescribed. Call your doctor if you think you are having a problem with your medicine. You will get more details on the specific medicine your doctor prescribes. · If your doctor prescribed antibiotics, take them as directed. Do not stop taking them just because you feel better. You need to take the full course of antibiotics. · Breathe moist air from a humidifier, hot shower, or sink filled with hot water. This may help ease your symptoms and make it easier for you to breathe. · If you have congestion in your nose and throat, drinking plenty of fluids, especially hot fluids, may help relieve your symptoms.  If you have kidney, heart, or liver disease and have to limit fluids, talk with your doctor before you increase the amount of fluids you drink. · If you have mucus in your airways, it may help to breathe deeply and cough. · Do not smoke or allow others to smoke around you. Smoking can make your wheezing worse. If you need help quitting, talk to your doctor about stop-smoking programs and medicines. These can increase your chances of quitting for good. · Avoid things that may cause your wheezing. These may include colds, smoke, air pollution, dust, pollen, pets, cockroaches, stress, and cold air. When should you call for help? Call 911 anytime you think you may need emergency care. For example, call if:  · You have severe trouble breathing. · You passed out (lost consciousness). Call your doctor now or seek immediate medical care if:  · You cough up yellow, dark brown, or bloody mucus (sputum). · You have new or worse shortness of breath. · Your wheezing is not getting better or it gets worse after you start taking your medicine. Watch closely for changes in your health, and be sure to contact your doctor if:  · You do not get better as expected. Where can you learn more? Go to http://radha-abdiaziz.info/. Enter 454 [id number] in the search box to learn more about \"Wheezing or Bronchoconstriction: Care Instructions. \"  Current as of: May 23, 2016  Content Version: 11.1  © 3073-5515 [employer]. Care instructions adapted under license by [employer] (which disclaims liability or warranty for this information). If you have questions about a medical condition or this instruction, always ask your healthcare professional. [place] [address] any warranty or liability for your use of this information.

## 2017-02-22 ENCOUNTER — OFFICE VISIT (OUTPATIENT)
Dept: INTERNAL MEDICINE CLINIC | Age: 82
End: 2017-02-22

## 2017-02-22 ENCOUNTER — HOSPITAL ENCOUNTER (OUTPATIENT)
Dept: LAB | Age: 82
Discharge: HOME | End: 2017-02-22
Payer: MEDICARE

## 2017-02-22 VITALS
OXYGEN SATURATION: 96 % | HEIGHT: 56 IN | BODY MASS INDEX: 28.79 KG/M2 | RESPIRATION RATE: 12 BRPM | WEIGHT: 128 LBS | TEMPERATURE: 97.2 F | SYSTOLIC BLOOD PRESSURE: 148 MMHG | DIASTOLIC BLOOD PRESSURE: 50 MMHG | HEART RATE: 68 BPM

## 2017-02-22 DIAGNOSIS — K62.5 RECTAL BLEEDING: Primary | ICD-10-CM

## 2017-02-22 DIAGNOSIS — R20.0 NUMBNESS OF RIGHT HAND: ICD-10-CM

## 2017-02-22 DIAGNOSIS — K62.5 RECTAL BLEEDING: ICD-10-CM

## 2017-02-22 LAB
ALBUMIN SERPL BCP-MCNC: 3.7 G/DL (ref 3.4–5)
ALBUMIN/GLOB SERPL: 1.3 {RATIO} (ref 0.8–1.7)
ALP SERPL-CCNC: 59 U/L (ref 45–117)
ALT SERPL-CCNC: 17 U/L (ref 13–56)
ANION GAP BLD CALC-SCNC: 7 MMOL/L (ref 3–18)
AST SERPL W P-5'-P-CCNC: 16 U/L (ref 15–37)
BILIRUB SERPL-MCNC: 0.4 MG/DL (ref 0.2–1)
BUN SERPL-MCNC: 16 MG/DL (ref 7–18)
BUN/CREAT SERPL: 19 (ref 12–20)
CALCIUM SERPL-MCNC: 10.1 MG/DL (ref 8.5–10.1)
CHLORIDE SERPL-SCNC: 104 MMOL/L (ref 100–108)
CO2 SERPL-SCNC: 30 MMOL/L (ref 21–32)
CREAT SERPL-MCNC: 0.85 MG/DL (ref 0.6–1.3)
ERYTHROCYTE [DISTWIDTH] IN BLOOD BY AUTOMATED COUNT: 14.2 % (ref 11.6–14.5)
GLOBULIN SER CALC-MCNC: 2.9 G/DL (ref 2–4)
GLUCOSE SERPL-MCNC: 109 MG/DL (ref 74–99)
HCT VFR BLD AUTO: 41.1 % (ref 35–45)
HGB BLD-MCNC: 13.2 G/DL (ref 12–16)
IRON SATN MFR SERPL: 20 %
IRON SERPL-MCNC: 73 UG/DL (ref 50–175)
MCH RBC QN AUTO: 30 PG (ref 24–34)
MCHC RBC AUTO-ENTMCNC: 32.1 G/DL (ref 31–37)
MCV RBC AUTO: 93.4 FL (ref 74–97)
PLATELET # BLD AUTO: 153 K/UL (ref 135–420)
PMV BLD AUTO: 12.5 FL (ref 9.2–11.8)
POTASSIUM SERPL-SCNC: 4.3 MMOL/L (ref 3.5–5.5)
PROT SERPL-MCNC: 6.6 G/DL (ref 6.4–8.2)
RBC # BLD AUTO: 4.4 M/UL (ref 4.2–5.3)
SODIUM SERPL-SCNC: 141 MMOL/L (ref 136–145)
TIBC SERPL-MCNC: 358 UG/DL (ref 250–450)
TSH SERPL DL<=0.05 MIU/L-ACNC: 3.27 UIU/ML (ref 0.36–3.74)
VIT B12 SERPL-MCNC: 597 PG/ML (ref 211–911)
WBC # BLD AUTO: 6.5 K/UL (ref 4.6–13.2)

## 2017-02-22 PROCEDURE — 85027 COMPLETE CBC AUTOMATED: CPT | Performed by: INTERNAL MEDICINE

## 2017-02-22 PROCEDURE — 80053 COMPREHEN METABOLIC PANEL: CPT | Performed by: INTERNAL MEDICINE

## 2017-02-22 PROCEDURE — 36415 COLL VENOUS BLD VENIPUNCTURE: CPT | Performed by: INTERNAL MEDICINE

## 2017-02-22 PROCEDURE — 84443 ASSAY THYROID STIM HORMONE: CPT | Performed by: INTERNAL MEDICINE

## 2017-02-22 PROCEDURE — 83540 ASSAY OF IRON: CPT | Performed by: INTERNAL MEDICINE

## 2017-02-22 PROCEDURE — 82607 VITAMIN B-12: CPT | Performed by: INTERNAL MEDICINE

## 2017-02-22 NOTE — PROGRESS NOTES
HISTORY OF PRESENT ILLNESS  Cynthia Schneider is a 80 y.o. female. HPI Comments: 81 yo female with c/o BRBPR for the past 6 months intermittently. She has h/o colon cancer dx in 2005 treated with hemicolectomy. No colonoscopy since that time. No significant weight loss. Notes blood is mixed with stool and at times on tissue. Also c/o increased hand numbness on R and trigger finger. H/o carpal tunnel release on L. Rectal Bleeding   Pertinent negatives include no chest pain, no abdominal pain, no headaches and no shortness of breath. Review of Systems   Constitutional: Negative for chills, fever and weight loss. HENT: Negative for congestion. Eyes: Negative for blurred vision and pain. Respiratory: Negative for cough and shortness of breath. Cardiovascular: Negative for chest pain, palpitations and leg swelling. Gastrointestinal: Positive for anal bleeding, blood in stool and constipation (occasional). Negative for abdominal pain, heartburn, melena, nausea and vomiting. Genitourinary: Negative for frequency and urgency. Musculoskeletal: Positive for joint pain. Negative for falls and myalgias. Neurological: Positive for speech change (L hand). Negative for dizziness, tingling, focal weakness and headaches. Psychiatric/Behavioral: Negative for depression. The patient is not nervous/anxious. Past Medical History:   Diagnosis Date    Arthritis     CAD (coronary artery disease)     Cancer ([facility] [address]     breast and colon    GERD (gastroesophageal reflux disease)     High cholesterol     Hypertension     Urinary incontinence      Current Outpatient Prescriptions on File Prior to Visit   Medication Sig Dispense Refill    albuterol (PROVENTIL HFA, VENTOLIN HFA, PROAIR HFA) 90 mcg/actuation inhaler Take 2 Puffs by inhalation every six (6) hours as needed for Wheezing.  1 Inhaler 3    inhalational spacing device Use as directed with inhaler 1 Device 0    PREMARIN 0.625 mg/gram vaginal cream APPLY A SMALL AMOUNT TO AFFECTED AREA EVERY NIGHT AS DIRECTED  0    amoxicillin-clavulanate (AUGMENTIN) 500-125 mg per tablet TAKE 1/2 TABLET BY MOUTH ONCE A DAY AS DIRECTED FOR 90 DAYS.  0    clopidogrel (PLAVIX) 75 mg tablet Take 75 mg by mouth daily.  DYMISTA 137-50 mcg/spray spry INSTILL 1 SPRAY TO EACH NOSTRIL TWICE A DAY  12    FOLIC ACID/MULTIVIT-MIN/LUTEIN (CENTRUM SILVER PO) Take  by mouth.  trospium (SANCTURA XL) 60 mg capsule Take 60 mg by mouth Daily (before breakfast).  pravastatin (PRAVACHOL) 40 mg tablet Take 40 mg by mouth nightly.  carvedilol (COREG) 3.125 mg tablet Take 3.125 mg by mouth two (2) times daily (with meals). 0    aspirin delayed-release 81 mg tablet Take  by mouth daily.  LACTOBACILLUS ACIDOPHILUS (PROBIOTIC PO) Take  by mouth daily.  nitrofurantoin, macrocrystal-monohydrate, (MACROBID) 100 mg capsule TAKE 1 TABLET BY MOUTH TWICE A DAY FOR 7 DAYS.  0    NEXIUM 40 mg capsule Take 40 mg by mouth daily.  NITROSTAT 0.4 mg SL tablet 0.4 mg by SubLINGual route every five (5) minutes as needed. No current facility-administered medications on file prior to visit. Social History     Social History    Marital status:      Spouse name: N/A    Number of children: N/A    Years of education: N/A     Occupational History    retired      Social History Main Topics    Smoking status: Former Smoker     Types: Cigarettes     Quit date: 7/21/1983    Smokeless tobacco: Never Used    Alcohol use Yes      Comment: rare    Drug use: Not on file    Sexual activity: Not on file     Other Topics Concern    Not on file     Social History Narrative     Physical Exam   Constitutional: She appears well-developed and well-nourished. No distress.    /50 (BP 1 Location: Left arm, BP Patient Position: Sitting)  Pulse 68  Temp 97.2 °F (36.2 °C) (Oral)   Resp 12  Ht 4' 8\" (1.422 m)  Wt 128 lb (58.1 kg)  SpO2 96%  BMI 28.7 kg/m2 Eyes: EOM are normal. Right eye exhibits no discharge. Left eye exhibits no discharge. No scleral icterus. Cardiovascular: Normal rate, regular rhythm and normal heart sounds. Exam reveals no gallop and no friction rub. No murmur heard. Pulmonary/Chest: Effort normal and breath sounds normal. No respiratory distress. She has no wheezes. She has no rales. Abdominal: Soft. She exhibits no distension. There is no tenderness. There is no rebound and no guarding. Musculoskeletal: She exhibits no edema or tenderness. Neurological: She is alert. She exhibits normal muscle tone. Skin: Skin is warm and dry. Psychiatric: She has a normal mood and affect. ASSESSMENT and PLAN    ICD-10-CM ICD-9-CM    1. Rectal bleeding K62.5 569.3 CBC W/O DIFF      METABOLIC PANEL, COMPREHENSIVE      IRON PROFILE      XR BA ENEMA  AIR CONT   2. Numbness of right hand R20.0 782.0 TSH 3RD GENERATION      VITAMIN B12      EMG TWO EXTREMITIES UPPER   Discussed evaluation options with pt and her daughter. Hesitant for colonoscopy given her recent stent/plavix use, advanced age. Could be related to hemorrhoids, but she does have h/o Colon CA. She does agree to repeat labs today and air contrast barium enema to further assess. Will check EMG given h/o CTS, continue with wrist brace. Will consider hand referral when results available.

## 2017-02-22 NOTE — MR AVS SNAPSHOT
Visit Information Date & Time Provider Department Dept. Phone Encounter #  
 2/22/2017  1:45 PM Loyd NunesLilly [facility] [phone] [id number] Follow-up Instructions Return in about 1 month (around 3/22/2017), or if symptoms worsen or fail to improve, for bleeding. Your Appointments 5/1/2017 12:45 PM  
Office Visit with MD RUDOLPH Almeida [facility]) Appt Note: 3 month f/u MWV-s  
 [address]  
  
   
 [address] [facility] Upcoming Health Maintenance Date Due DTaP/Tdap/Td series (1 - Tdap) 4/8/1947 ZOSTER VACCINE AGE 60> 4/8/1986 GLAUCOMA SCREENING Q2Y 4/8/1991 Pneumococcal 65+ Low/Medium Risk (1 of 2 - PCV13) 4/8/1991 MEDICARE YEARLY EXAM 5/1/2017* OSTEOPOROSIS SCREENING (DEXA) 1/1/2018* *Topic was postponed. The date shown is not the original due date. Allergies as of 2/22/2017  Review Complete On: 2/22/2017 By: Janet Castaneda LPN Severity Noted Reaction Type Reactions Novocain [Procaine]  07/21/2016    Swelling Sulfa (Sulfonamide Antibiotics)  07/21/2016    Rash Current Immunizations  Never Reviewed Name Date Influenza High Dose Vaccine PF 10/7/2016 Not reviewed this visit You Were Diagnosed With   
  
 Codes Comments Rectal bleeding    -  Primary ICD-10-CM: K62.5 ICD-9-CM: 569.3 Numbness of right hand     ICD-10-CM: R20.0 ICD-9-CM: 365. 0 Vitals BP  
  
  
  
  
  
 148/50 (BP 1 Location: Left arm, BP Patient Position: Sitting) Vitals History BMI and BSA Data Body Mass Index Body Surface Area 28.7 kg/m 2 1.52 m 2 Preferred Pharmacy Pharmacy Name Phone [id number] [address], [address] [facility] [place], [place],[address] [facility] [phone] Your Updated Medication List  
  
   
 This list is accurate as of: 2/22/17  2:29 PM.  Always use your most recent med list.  
  
  
  
  
 albuterol 90 mcg/actuation inhaler Commonly known as:  PROVENTIL HFA, VENTOLIN HFA, PROAIR HFA Take 2 Puffs by inhalation every six (6) hours as needed for Wheezing. amoxicillin-clavulanate 500-125 mg per tablet Commonly known as:  AUGMENTIN  
TAKE 1/2 TABLET BY MOUTH ONCE A DAY AS DIRECTED FOR 90 DAYS. aspirin delayed-release 81 mg tablet Take  by mouth daily. carvedilol 3.125 mg tablet Commonly known as:  Carmela Chew Take 3.125 mg by mouth two (2) times daily (with meals). CENTRUM SILVER PO Take  by mouth. clopidogrel 75 mg Tab Commonly known as:  PLAVIX Take 75 mg by mouth daily. DYMISTA 137-50 mcg/spray [place] Generic drug:  azelastine-fluticasone INSTILL 1 SPRAY TO EACH NOSTRIL TWICE A DAY  
  
 inhalational spacing device Use as directed with inhaler NexIUM 40 mg capsule Generic drug:  esomeprazole Take 40 mg by mouth daily. nitrofurantoin (macrocrystal-monohydrate) 100 mg capsule Commonly known as:  MACROBID  
TAKE 1 TABLET BY MOUTH TWICE A DAY FOR 7 DAYS. NITROSTAT 0.4 mg SL tablet Generic drug:  nitroglycerin 0.4 mg by SubLINGual route every five (5) minutes as needed. pravastatin 40 mg tablet Commonly known as:  PRAVACHOL Take 40 mg by mouth nightly. PREMARIN 0.625 mg/gram vaginal cream  
Generic drug:  conjugated estrogens APPLY A SMALL AMOUNT TO AFFECTED AREA EVERY NIGHT AS DIRECTED PROBIOTIC PO Take  by mouth daily. trospium 60 mg capsule Commonly known as:  SANCTURA XL Take 60 mg by mouth Daily (before breakfast). Follow-up Instructions Return in about 1 month (around 3/22/2017), or if symptoms worsen or fail to improve, for bleeding. To-Do List   
 Around 02/22/2017 Neurology:  EMG TWO EXTREMITIES UPPER Around 02/22/2017 Imaging:  XR BA ENEMA  AIR CONT Patient Instructions Lower Gastrointestinal Bleeding: Care Instructions Your Care Instructions The digestive or gastrointestinal tract goes from the mouth to the anus. It is often called the GI tract. Bleeding in the lower GI tract can happen anywhere in your small or large intestine. It can also happen in your rectum or anus. In some cases, it is caused by an infection, cancer, or inflammatory bowel disease. Or it may be caused by hemorrhoids, diverticulitis, or clotting problems. Light bleeding may not cause any symptoms at first. But if you continue to bleed for a while, you may feel very weak or tired. Sudden, heavy bleeding means you need to see a doctor right away. This kind of bleeding can be very dangerous. But it can usually be cured or controlled. The doctor may do some tests to find the cause of your bleeding. Follow-up care is a key part of your treatment and safety. Be sure to make and go to all appointments, and call your doctor if you are having problems. It's also a good idea to know your test results and keep a list of the medicines you take. How can you care for yourself at home? · Be safe with medicines. Take your medicines exactly as prescribed. Call your doctor if you think you are having a problem with your medicine. You will get more details on the specific medicines your doctor prescribes. · Do not take aspirin or other anti-inflammatory medicines, such as naproxen (Aleve) or ibuprofen (Advil, Motrin), without talking to your doctor first. Ask your doctor if it is okay to use acetaminophen (Tylenol). · Do not drink alcohol. · The bleeding may make you lose iron. So it's important to eat foods that have a lot of iron. These include red meat, shellfish, poultry, and eggs. They also include beans, raisins, whole-grain breads, and leafy green vegetables. If you want help planning meals, you can meet with a dietitian. When should you call for help? Call 911 anytime you think you may need emergency care. For example, call if: 
· You have sudden, severe belly pain. · You vomit blood or what looks like coffee grounds. · You passed out (lost consciousness). · Your stools are maroon or very bloody. Call your doctor now or seek immediate medical care if: 
· You are dizzy or lightheaded, or you feel like you may faint. · Your stools are black and look like tar, or they have streaks of blood. · You have belly pain. · You vomit or have nausea. Watch closely for changes in your health, and be sure to contact your doctor if you do not get better as expected. Where can you learn more? Go to http://radha-abdiaziz.info/. Enter H893 in the search box to learn more about \"Lower Gastrointestinal Bleeding: Care Instructions. \" Current as of: May 27, 2016 Content Version: 11.1 © 4244-6162 GoodChime!. Care instructions adapted under license by idealista.com (which disclaims liability or warranty for this information). If you have questions about a medical condition or this instruction, always ask your healthcare professional. [place] [address] any warranty or liability for your use of this information. Introducing [facility]! [employer] introduces Yippy patient portal. Now you can access parts of your medical record, email your doctor's office, and request medication refills online. 1. In your internet browser, go to [email or web address] 2. Click on the First Time User? Click Here link in the Sign In box. You will see the New Member Sign Up page. 3. Enter your Yippy Access Code exactly as it appears below. You will not need to use this code after youve completed the sign-up process. If you do not sign up before the expiration date, you must request a new code. · Yippy Access Code: [email or web address] Expires: 5/2/2017  1:29 PM 
 
 4. Enter the last four digits of your Social Security Number (xxxx) and Date of Birth (mm/dd/yyyy) as indicated and click Submit. You will be taken to the next sign-up page. 5. Create a Oink ID. This will be your Oink login ID and cannot be changed, so think of one that is secure and easy to remember. 6. Create a Oink password. You can change your password at any time. 7. Enter your Password Reset Question and Answer. This can be used at a later time if you forget your password. 8. Enter your e-mail address. You will receive e-mail notification when new information is available in [address] 9. Click Sign Up. You can now view and download portions of your medical record. 10. Click the Download Summary menu link to download a portable copy of your medical information. If you have questions, please visit the Frequently Asked Questions section of the Oink website. Remember, Oink is NOT to be used for urgent needs. For medical emergencies, dial 911. Now available from your iPhone and Android! Please provide this summary of care documentation to your next provider. Your primary care clinician is listed as Raul Garay Avcordelia. If you have any questions after today's visit, please call [phone].

## 2017-02-22 NOTE — PATIENT INSTRUCTIONS
Lower Gastrointestinal Bleeding: Care Instructions  Your Care Instructions    The digestive or gastrointestinal tract goes from the mouth to the anus. It is often called the GI tract. Bleeding in the lower GI tract can happen anywhere in your small or large intestine. It can also happen in your rectum or anus. In some cases, it is caused by an infection, cancer, or inflammatory bowel disease. Or it may be caused by hemorrhoids, diverticulitis, or clotting problems. Light bleeding may not cause any symptoms at first. But if you continue to bleed for a while, you may feel very weak or tired. Sudden, heavy bleeding means you need to see a doctor right away. This kind of bleeding can be very dangerous. But it can usually be cured or controlled. The doctor may do some tests to find the cause of your bleeding. Follow-up care is a key part of your treatment and safety. Be sure to make and go to all appointments, and call your doctor if you are having problems. It's also a good idea to know your test results and keep a list of the medicines you take. How can you care for yourself at home? · Be safe with medicines. Take your medicines exactly as prescribed. Call your doctor if you think you are having a problem with your medicine. You will get more details on the specific medicines your doctor prescribes. · Do not take aspirin or other anti-inflammatory medicines, such as naproxen (Aleve) or ibuprofen (Advil, Motrin), without talking to your doctor first. Ask your doctor if it is okay to use acetaminophen (Tylenol). · Do not drink alcohol. · The bleeding may make you lose iron. So it's important to eat foods that have a lot of iron. These include red meat, shellfish, poultry, and eggs. They also include beans, raisins, whole-grain breads, and leafy green vegetables. If you want help planning meals, you can meet with a dietitian. When should you call for help?   Call 911 anytime you think you may need emergency care. For example, call if:  · You have sudden, severe belly pain. · You vomit blood or what looks like coffee grounds. · You passed out (lost consciousness). · Your stools are maroon or very bloody. Call your doctor now or seek immediate medical care if:  · You are dizzy or lightheaded, or you feel like you may faint. · Your stools are black and look like tar, or they have streaks of blood. · You have belly pain. · You vomit or have nausea. Watch closely for changes in your health, and be sure to contact your doctor if you do not get better as expected. Where can you learn more? Go to http://radha-abdiaziz.info/. Enter O208 in the search box to learn more about \"Lower Gastrointestinal Bleeding: Care Instructions. \"  Current as of: May 27, 2016  Content Version: 11.1  © 3192-4542 XATA. Care instructions adapted under license by [employer] (which disclaims liability or warranty for this information). If you have questions about a medical condition or this instruction, always ask your healthcare professional. [place] [address] any warranty or liability for your use of this information.

## 2017-02-22 NOTE — PROGRESS NOTES
Patient presents today with C/O rectal bleeding, patient reports blood is bright red. Pt preferred language for healthcare discussion is english. Do you have an advanced directive? Yes  Is someone accompanying this pt? If so who? Yes daughter  Is the patient using any DME equipment during OV? Yes What equipment? walker  1. Have you been to the ER, urgent care clinic since your last visit? Hospitalized since your last visit?  No

## 2017-03-09 ENCOUNTER — HOSPITAL ENCOUNTER (OUTPATIENT)
Dept: GENERAL RADIOLOGY | Age: 82
Discharge: HOME | End: 2017-03-09
Attending: INTERNAL MEDICINE
Payer: MEDICARE

## 2017-03-09 DIAGNOSIS — K62.5 RECTAL BLEEDING: ICD-10-CM

## 2017-03-09 PROCEDURE — 74280 X-RAY XM COLON 2CNTRST STD: CPT

## 2017-03-09 PROCEDURE — 74011000255 HC RX REV CODE- 255

## 2017-03-10 ENCOUNTER — TELEPHONE (OUTPATIENT)
Dept: INTERNAL MEDICINE CLINIC | Age: 82
End: 2017-03-10

## 2017-03-10 NOTE — TELEPHONE ENCOUNTER
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 See below report    BARIUM ENEMA WITH AIR CONTRAST     INDICATION: Rectal bleeding. History of colon cancer status post reported  partial colectomy.     COMPARISON: None     TECHNIQUE AND FINDINGS: A  radiograph of the abdomen was obtained and  demonstrates a nonobstructive bowel gas pattern. There is no evidence of  pneumoperitoneum. Surgical clips/sutures present at the right lower quadrant  abdomen. Multilevel degenerative spondylosis and disc disease noted.     The catheter tip was inserted into the rectum and the balloon inflated under  fluoroscopy. Barium was then allowed to flow retrograde by gravity into the  colon. The colon was insufflated and multiple spot fluoroscopic views were  obtained. Multiple overhead and lateral radiographic views were also obtained.     Sigmoid diverticulosis noted. Relatively featureless appearance of the sigmoid  colon, may be sequela of chronic diverticulosis. No polyps, strictures or masses  were seen. The appendix and terminal ileum were not visualized. .     Fluoroscopy time: 3 minutes 36 seconds. Fluoroscopic images: 28     IMPRESSION  IMPRESSION:  Sigmoid diverticulosis. Otherwise, negative air contrast barium enema.

## 2017-03-10 NOTE — TELEPHONE ENCOUNTER
Incoming call from pt daughter Clara Anders (listed on pt PHI). 2 pt identifiers confirmed. Pt had Barium Enema With Air Contrast on 03/09/2017. Pt daughter states she is not able to view pt results for Barium Enema with Air Contrast on [facility]. Informed daughter that MD may need to release this information before pt is able to view results. Pt daughter stated understanding and reports pt mother is pretty anxious and would like to know results of study. Informed pt daughter that an MD would be made aware that she would like to know results of test.     Please advise.

## 2017-03-10 NOTE — TELEPHONE ENCOUNTER
Ashley Brewer. At number number. Two patient Identifiers confirmed. Advised pt per Dr Noah Shine notes. Jaelyn verbalized understanding.

## 2017-03-10 NOTE — TELEPHONE ENCOUNTER
Marsha is calling for patient results she had on yesterday and would like a nurse to call her at [phone]

## 2017-03-10 NOTE — TELEPHONE ENCOUNTER
Contacted Steve Cooper ( listed on PHI) . Two patient Identifiers confirmed. She advised that she could not see pts results for BA Enema as of yesterday. Advised that order was now resulted and she may be able to see them today. She stated she would look on mychart again but would also like a call back on results. She also inquired on referral. Advised that referral was still in que and that referral coordinator would contact her with appt or office she was referred to would contact her with date and time of appt. She verbalized understanding. Please advise on results.

## 2017-03-10 NOTE — TELEPHONE ENCOUNTER
This really should wait for Dr. Linda Long,  But I understand the anxiety problem.   It did not find anything out of the ordinary

## 2017-04-12 ENCOUNTER — OFFICE VISIT (OUTPATIENT)
Dept: INTERNAL MEDICINE CLINIC | Age: 82
End: 2017-04-12

## 2017-04-12 VITALS
SYSTOLIC BLOOD PRESSURE: 124 MMHG | HEIGHT: 56 IN | DIASTOLIC BLOOD PRESSURE: 79 MMHG | BODY MASS INDEX: 28.84 KG/M2 | OXYGEN SATURATION: 91 % | RESPIRATION RATE: 18 BRPM | WEIGHT: 128.2 LBS | HEART RATE: 74 BPM | TEMPERATURE: 98 F

## 2017-04-12 DIAGNOSIS — K57.30 DIVERTICULOSIS OF LARGE INTESTINE WITHOUT HEMORRHAGE: Primary | ICD-10-CM

## 2017-04-12 DIAGNOSIS — R42 DIZZINESS: ICD-10-CM

## 2017-04-12 NOTE — PROGRESS NOTES
Perri Palomino presents today for   Chief Complaint   Patient presents with    Rectal Bleeding     it has stopped    ED Follow-up     dizziness, nausea, elevated blood pressure       Perri Palomino preferred language for health care discussion is english/other. Is someone accompanying this pt? Yes, daughter    Is the patient using any DME equipment during [address]? no    Depression Screening:  PHQ 2 / 9, over the last two weeks 11/1/2016   Little interest or pleasure in doing things More than half the days   Feeling down, depressed or hopeless Not at all   Total Score PHQ 2 2       Learning Assessment:  No flowsheet data found. Abuse Screening:  No flowsheet data found. Fall Risk  Fall Risk Assessment, last 12 mths 2/22/2017 11/1/2016 7/21/2016   Able to walk? Yes Yes Yes   Fall in past 12 months? Yes Yes No   Fall with injury? Yes No -   Number of falls in past 12 months 1 1 -   Fall Risk Score 2 1 -       Health Maintenance reviewed and discussed per provider. Yes    Perri Palomino is due for tdap, zoster, glaucoma screening pneu. MAWV is due 5/1/17. Please order/place referral if appropriate. Advance Directive:  1. Do you have an advance directive in place? Patient Reply: yes    2. If not, would you like material regarding how to put one in place? Patient Reply: no asked them to bring in a copy for our files    Coordination of Care:  1. Have you been to the ER, urgent care clinic since your last visit? Hospitalized since your last visit? Yes, [facility]    2. Have you seen or consulted any other health care providers outside of the [address] [place] [address] since your last visit? Include any pap smears or colon screening.  no

## 2017-04-12 NOTE — PATIENT INSTRUCTIONS
Diverticulosis: Care Instructions  Your Care Instructions  In diverticulosis, pouches called diverticula form in the wall of the large intestine (colon). The pouches do not cause any pain or other symptoms. Most people who have diverticulosis do not know they have it. But the pouches sometimes bleed, and if they become infected, they can cause pain and other symptoms. When this happens, it is called diverticulitis. Diverticula form when pressure pushes the wall of the colon outward at certain weak points. A diet that is too low in fiber can cause diverticula. Follow-up care is a key part of your treatment and safety. Be sure to make and go to all appointments, and call your doctor if you are having problems. It's also a good idea to know your test results and keep a list of the medicines you take. How can you care for yourself at home? · Include fruits, leafy green vegetables, beans, and whole grains in your diet each day. These foods are high in fiber. · Take a fiber supplement, such as Citrucel or Metamucil, every day if needed. Read and follow all instructions on the label. · Drink plenty of fluids, enough so that your urine is light yellow or clear like water. If you have kidney, heart, or liver disease and have to limit fluids, talk with your doctor before you increase the amount of fluids you drink. · Get at least 30 minutes of exercise on most days of the week. Walking is a good choice. You also may want to do other activities, such as running, swimming, cycling, or playing tennis or team sports. · Cut out foods that cause gas, pain, or other symptoms. When should you call for help? Call your doctor now or seek immediate medical care if:  · You have belly pain. · You pass maroon or very bloody stools. · You have a fever. · You have nausea and vomiting. · You have unusual changes in your bowel movements or abdominal swelling. · You have burning pain when you urinate.   · You have abnormal vaginal discharge. · You have shoulder pain. · You have cramping pain that does not get better when you have a bowel movement or pass gas. · You pass gas or stool from your urethra while urinating. Watch closely for changes in your health, and be sure to contact your doctor if you have any problems. Where can you learn more? Go to http://radha-abdiaziz.info/. Enter X818 in the search box to learn more about \"Diverticulosis: Care Instructions. \"  Current as of: August 9, 2016  Content Version: 11.2  © 0786-7297 CollabFinder. Care instructions adapted under license by [employer] (which disclaims liability or warranty for this information). If you have questions about a medical condition or this instruction, always ask your healthcare professional. [address] any warranty or liability for your use of this information.

## 2017-04-12 NOTE — MR AVS SNAPSHOT
Visit Information Date & Time Provider Department Dept. Phone Encounter #  
 4/12/2017 10:00 AM Jorge Ferreira [address] [phone] [id number] Follow-up Instructions Return in about 3 months (around 7/12/2017), or if symptoms worsen or fail to improve. Your Appointments 5/1/2017 12:45 PM  
Office Visit with MD RUDOLPH Spann [facility]) Appt Note: 3 month f/u MWV-s  
 [address]  
  
   
 [address] [facility] Upcoming Health Maintenance Date Due DTaP/Tdap/Td series (1 - Tdap) 4/8/1947 ZOSTER VACCINE AGE 60> 4/8/1986 GLAUCOMA SCREENING Q2Y 4/8/1991 Pneumococcal 65+ Low/Medium Risk (1 of 2 - PCV13) 4/8/1991 MEDICARE YEARLY EXAM 5/1/2017* OSTEOPOROSIS SCREENING (DEXA) 1/1/2018* *Topic was postponed. The date shown is not the original due date. Allergies as of 4/12/2017  Review Complete On: 4/12/2017 By: Jorge Ferreira MD  
  
 Severity Noted Reaction Type Reactions Novocain [Procaine]  07/21/2016    Swelling Sulfa (Sulfonamide Antibiotics)  07/21/2016    Rash Current Immunizations  Never Reviewed Name Date Influenza High Dose Vaccine PF 10/7/2016 Not reviewed this visit You Were Diagnosed With   
  
 Codes Comments Diverticulosis of large intestine without hemorrhage    -  Primary ICD-10-CM: K57.30 ICD-9-CM: 562.10 Dizziness     ICD-10-CM: N83 ICD-9-CM: 780.4 Vitals BP Pulse Temp Resp Height(growth percentile) Weight(growth percentile) 124/79 (BP 1 Location: Right arm, BP Patient Position: Sitting) 74 98 °F (36.7 °C) (Oral) 18 4' 8\" (1.422 m) 128 lb 3.2 oz (58.2 kg) SpO2 BMI OB Status Smoking Status 91% 28.74 kg/m2 Postmenopausal Former Smoker Vitals History BMI and BSA Data Body Mass Index Body Surface Area 28.74 kg/m 2 1.52 m 2 Preferred Pharmacy Pharmacy Name Phone [facility] #[id number] [facility] [phone] Your Updated Medication List  
  
   
This list is accurate as of: 4/12/17 10:23 AM.  Always use your most recent med list.  
  
  
  
  
 albuterol 90 mcg/actuation inhaler Commonly known as:  PROVENTIL HFA, VENTOLIN HFA, PROAIR HFA Take 2 Puffs by inhalation every six (6) hours as needed for Wheezing. amoxicillin-clavulanate 500-125 mg per tablet Commonly known as:  AUGMENTIN  
TAKE 1/2 TABLET BY MOUTH ONCE A DAY AS DIRECTED FOR 90 DAYS. aspirin delayed-release 81 mg tablet Take  by mouth daily. carvedilol 3.125 mg tablet Commonly known as:  Sirena Breed Take 3.125 mg by mouth two (2) times daily (with meals). CENTRUM SILVER PO Take  by mouth. clopidogrel 75 mg Tab Commonly known as:  PLAVIX Take 75 mg by mouth daily. DYMISTA 137-50 mcg/spray [place] Generic drug:  azelastine-fluticasone INSTILL 1 SPRAY TO EACH NOSTRIL TWICE A DAY  
  
 inhalational spacing device Use as directed with inhaler NexIUM 40 mg capsule Generic drug:  esomeprazole Take 40 mg by mouth daily. nitrofurantoin (macrocrystal-monohydrate) 100 mg capsule Commonly known as:  MACROBID  
TAKE 1 TABLET BY MOUTH TWICE A DAY FOR 7 DAYS. NITROSTAT 0.4 mg SL tablet Generic drug:  nitroglycerin 0.4 mg by SubLINGual route every five (5) minutes as needed. pravastatin 40 mg tablet Commonly known as:  PRAVACHOL Take 40 mg by mouth nightly. PREMARIN 0.625 mg/gram vaginal cream  
Generic drug:  conjugated estrogens APPLY A SMALL AMOUNT TO AFFECTED AREA EVERY NIGHT AS DIRECTED PRESERVISION LUTEIN PO Take  by mouth. PROBIOTIC PO Take  by mouth daily. trospium 60 mg capsule Commonly known as:  SANCTURA XL Take 60 mg by mouth Daily (before breakfast). Follow-up Instructions Return in about 3 months (around 7/12/2017), or if symptoms worsen or fail to improve. Patient Instructions Diverticulosis: Care Instructions Your Care Instructions In diverticulosis, pouches called diverticula form in the wall of the large intestine (colon). The pouches do not cause any pain or other symptoms. Most people who have diverticulosis do not know they have it. But the pouches sometimes bleed, and if they become infected, they can cause pain and other symptoms. When this happens, it is called diverticulitis. Diverticula form when pressure pushes the wall of the colon outward at certain weak points. A diet that is too low in fiber can cause diverticula. Follow-up care is a key part of your treatment and safety. Be sure to make and go to all appointments, and call your doctor if you are having problems. It's also a good idea to know your test results and keep a list of the medicines you take. How can you care for yourself at home? · Include fruits, leafy green vegetables, beans, and whole grains in your diet each day. These foods are high in fiber. · Take a fiber supplement, such as Citrucel or Metamucil, every day if needed. Read and follow all instructions on the label. · Drink plenty of fluids, enough so that your urine is light yellow or clear like water. If you have kidney, heart, or liver disease and have to limit fluids, talk with your doctor before you increase the amount of fluids you drink. · Get at least 30 minutes of exercise on most days of the week. Walking is a good choice. You also may want to do other activities, such as running, swimming, cycling, or playing tennis or team sports. · Cut out foods that cause gas, pain, or other symptoms. When should you call for help? Call your doctor now or seek immediate medical care if: 
· You have belly pain. · You pass maroon or very bloody stools. · You have a fever. · You have nausea and vomiting. · You have unusual changes in your bowel movements or abdominal swelling. · You have burning pain when you urinate. · You have abnormal vaginal discharge. · You have shoulder pain. · You have cramping pain that does not get better when you have a bowel movement or pass gas. · You pass gas or stool from your urethra while urinating. Watch closely for changes in your health, and be sure to contact your doctor if you have any problems. Where can you learn more? Go to http://radha-abdiaziz.info/. Enter U444 in the search box to learn more about \"Diverticulosis: Care Instructions. \" Current as of: August 9, 2016 Content Version: 11.2 © 7548-8345 [employer]. Care instructions adapted under license by [employer] (which disclaims liability or warranty for this information). If you have questions about a medical condition or this instruction, always ask your healthcare professional. [address] any warranty or liability for your use of this information. Introducing [facility]! Dear Julio Cesar Calzada: Thank you for requesting a [employer] account. Our records indicate that you already have an active [employer] account. You can access your account anytime at [email or web address]. Did you know that you can access your hospital and ER discharge instructions at any time in [employer]? You can also review all of your test results from your hospital stay or ER visit. Additional Information If you have questions, please visit the Frequently Asked Questions section of the [employer] website at [email or web address] Remember, [employer] is NOT to be used for urgent needs. For medical emergencies, dial 911. Now available from your iPhone and Android! Please provide this summary of care documentation to your next provider. Your primary care clinician is listed as Raul De. If you have any questions after today's visit, please call [phone].

## 2017-04-12 NOTE — PROGRESS NOTES
HISTORY OF PRESENT ILLNESS  Rhoda Weaver is a 80 y.o. female. HPI Comments: 81 yo female here for f/u from recent ED visit 4/6/17 due to dizziness. Had norovirus at her facility and she had developed nausea and vomiting. Thought sx had resolved, but had recurrence of vomiting with associated elevated BP so was transported to the ED. CT head without acute findings. Sx improved with hydration. Now feels well. No complaints. She had barium enema since last visit due to BRBPR which showed sigmoid diverticulosis. No further bleeding. Has not yet heard back regarding referral for EMG. Review of Systems   Constitutional: Negative for chills, fever and weight loss. HENT: Negative for congestion. Eyes: Negative for blurred vision and pain. Respiratory: Negative for cough and shortness of breath. Cardiovascular: Negative for palpitations and leg swelling. Gastrointestinal: Negative for blood in stool, diarrhea, heartburn, nausea and vomiting. Musculoskeletal: Negative for joint pain and myalgias. Neurological: Negative for dizziness, tingling and headaches. Psychiatric/Behavioral: Negative for depression. The patient is not nervous/anxious. Past Medical History:   Diagnosis Date    Arthritis     CAD (coronary artery disease)     Cancer ([facility] [address]     breast and colon    GERD (gastroesophageal reflux disease)     High cholesterol     Hypertension     Urinary incontinence      Current Outpatient Prescriptions on File Prior to Visit   Medication Sig Dispense Refill    albuterol (PROVENTIL HFA, VENTOLIN HFA, PROAIR HFA) 90 mcg/actuation inhaler Take 2 Puffs by inhalation every six (6) hours as needed for Wheezing.  1 Inhaler 3    inhalational spacing device Use as directed with inhaler 1 Device 0    PREMARIN 0.625 mg/gram vaginal cream APPLY A SMALL AMOUNT TO AFFECTED AREA EVERY NIGHT AS DIRECTED  0    amoxicillin-clavulanate (AUGMENTIN) 500-125 mg per tablet TAKE 1/2 TABLET BY MOUTH ONCE A DAY AS DIRECTED FOR 90 DAYS.  0    clopidogrel (PLAVIX) 75 mg tablet Take 75 mg by mouth daily.  DYMISTA 137-50 mcg/spray spry INSTILL 1 SPRAY TO EACH NOSTRIL TWICE A DAY  12    FOLIC ACID/MULTIVIT-MIN/LUTEIN (CENTRUM SILVER PO) Take  by mouth.  trospium (SANCTURA XL) 60 mg capsule Take 60 mg by mouth Daily (before breakfast).  pravastatin (PRAVACHOL) 40 mg tablet Take 40 mg by mouth nightly.  carvedilol (COREG) 3.125 mg tablet Take 3.125 mg by mouth two (2) times daily (with meals). 0    aspirin delayed-release 81 mg tablet Take  by mouth daily.  NEXIUM 40 mg capsule Take 40 mg by mouth daily.  LACTOBACILLUS ACIDOPHILUS (PROBIOTIC PO) Take  by mouth daily.  nitrofurantoin, macrocrystal-monohydrate, (MACROBID) 100 mg capsule TAKE 1 TABLET BY MOUTH TWICE A DAY FOR 7 DAYS.  0    NITROSTAT 0.4 mg SL tablet 0.4 mg by SubLINGual route every five (5) minutes as needed. No current facility-administered medications on file prior to visit. Social History   Substance Use Topics    Smoking status: Former Smoker     Types: Cigarettes     Quit date: 7/21/1983    Smokeless tobacco: Never Used    Alcohol use Yes      Comment: rare     Physical Exam   Constitutional: She appears well-developed and well-nourished. No distress. /79 (BP 1 Location: Right arm, BP Patient Position: Sitting)  Pulse 74  Temp 98 °F (36.7 °C) (Oral)   Resp 18  Ht 4' 8\" (1.422 m)  Wt 128 lb 3.2 oz (58.2 kg)  SpO2 91%  BMI 28.74 kg/m2     Eyes: EOM are normal. Right eye exhibits no discharge. Left eye exhibits no discharge. No scleral icterus. Cardiovascular: Normal rate, regular rhythm and normal heart sounds. Exam reveals no gallop and no friction rub. No murmur heard. Pulmonary/Chest: Effort normal and breath sounds normal. No respiratory distress. She has no wheezes. She has no rales. Abdominal: Soft. She exhibits no distension. There is no tenderness.  There is no rebound and no guarding. Musculoskeletal: She exhibits no edema or tenderness. Neurological: She is alert. She exhibits normal muscle tone. Skin: Skin is warm and dry. Psychiatric: She has a normal mood and affect. Lab Results   Component Value Date/Time    WBC 6.5 02/22/2017 02:24 PM    HGB 13.2 02/22/2017 02:24 PM    HCT 41.1 02/22/2017 02:24 PM    PLATELET 430 40/67/4998 02:24 PM    MCV 93.4 02/22/2017 02:24 PM     Lab Results   Component Value Date/Time    Sodium 141 02/22/2017 02:24 PM    Potassium 4.3 02/22/2017 02:24 PM    Chloride 104 02/22/2017 02:24 PM    CO2 30 02/22/2017 02:24 PM    Anion gap 7 02/22/2017 02:24 PM    Glucose 109 02/22/2017 02:24 PM    BUN 16 02/22/2017 02:24 PM    Creatinine 0.85 02/22/2017 02:24 PM    BUN/Creatinine ratio 19 02/22/2017 02:24 PM    GFR est AA >60 02/22/2017 02:24 PM    GFR est non-AA >60 02/22/2017 02:24 PM    Calcium 10.1 02/22/2017 02:24 PM    Bilirubin, total 0.4 02/22/2017 02:24 PM    AST (SGOT) 16 02/22/2017 02:24 PM    Alk. phosphatase 59 02/22/2017 02:24 PM    Protein, total 6.6 02/22/2017 02:24 PM    Albumin 3.7 02/22/2017 02:24 PM    Globulin 2.9 02/22/2017 02:24 PM    A-G Ratio 1.3 02/22/2017 02:24 PM    ALT (SGPT) 17 02/22/2017 02:24 PM     Lab Results   Component Value Date/Time    TSH 3.27 02/22/2017 02:24 PM     ASSESSMENT and PLAN    ICD-10-CM ICD-9-CM    1. Diverticulosis of large intestine without hemorrhage K57.30 562.10    2. Dizziness R42 780.4    No further bleeding. Will monitor. Sx from viral gastroenteritis have resolved.

## 2017-05-17 ENCOUNTER — TELEPHONE (OUTPATIENT)
Dept: INTERNAL MEDICINE CLINIC | Age: 82
End: 2017-05-17

## 2017-05-17 DIAGNOSIS — Z12.39 SCREENING FOR BREAST CANCER: Primary | ICD-10-CM

## 2017-05-17 DIAGNOSIS — Z12.31 ENCOUNTER FOR SCREENING MAMMOGRAM FOR MALIGNANT NEOPLASM OF BREAST: ICD-10-CM

## 2017-06-06 ENCOUNTER — HOSPITAL ENCOUNTER (OUTPATIENT)
Dept: MAMMOGRAPHY | Age: 82
Discharge: HOME | End: 2017-06-06
Attending: INTERNAL MEDICINE
Payer: MEDICARE

## 2017-06-06 DIAGNOSIS — Z12.39 SCREENING FOR BREAST CANCER: ICD-10-CM

## 2017-06-06 DIAGNOSIS — Z12.31 ENCOUNTER FOR SCREENING MAMMOGRAM FOR MALIGNANT NEOPLASM OF BREAST: ICD-10-CM

## 2017-06-06 PROCEDURE — 77063 BREAST TOMOSYNTHESIS BI: CPT

## 2017-06-06 PROCEDURE — 77067 SCR MAMMO BI INCL CAD: CPT

## 2017-07-25 ENCOUNTER — OFFICE VISIT (OUTPATIENT)
Dept: INTERNAL MEDICINE CLINIC | Age: 82
End: 2017-07-25

## 2017-07-25 VITALS
RESPIRATION RATE: 12 BRPM | TEMPERATURE: 97.2 F | HEIGHT: 56 IN | BODY MASS INDEX: 29.74 KG/M2 | HEART RATE: 72 BPM | OXYGEN SATURATION: 97 % | WEIGHT: 132.2 LBS | DIASTOLIC BLOOD PRESSURE: 42 MMHG | SYSTOLIC BLOOD PRESSURE: 147 MMHG

## 2017-07-25 DIAGNOSIS — Z00.00 ROUTINE GENERAL MEDICAL EXAMINATION AT A HEALTH CARE FACILITY: ICD-10-CM

## 2017-07-25 DIAGNOSIS — Z13.39 SCREENING FOR ALCOHOLISM: ICD-10-CM

## 2017-07-25 RX ORDER — KETOCONAZOLE 20 MG/ML
SHAMPOO TOPICAL
COMMUNITY
Start: 2017-07-19 | End: 2019-06-14 | Stop reason: SDUPTHER

## 2017-07-25 RX ORDER — CLOBETASOL PROPIONATE 0.05 G/ML
SPRAY TOPICAL
COMMUNITY
Start: 2017-07-19 | End: 2017-07-25

## 2017-07-25 RX ORDER — CETIRIZINE HCL 10 MG
10 TABLET ORAL
COMMUNITY
End: 2017-07-25

## 2017-07-25 RX ORDER — FLUOCINOLONE ACETONIDE 0.11 MG/ML
OIL AURICULAR (OTIC)
COMMUNITY
Start: 2017-07-19 | End: 2018-12-03

## 2017-07-25 RX ORDER — DIMETHICONE 13 MG/ML
850 LOTION TOPICAL
COMMUNITY
End: 2017-07-25

## 2017-07-25 RX ORDER — CALCIUM POLYCARBOPHIL 625 MG
TABLET ORAL
COMMUNITY
End: 2017-07-25

## 2017-07-25 NOTE — PROGRESS NOTES
This is a Subsequent Medicare Annual Wellness Visit providing Personalized Prevention Plan Services (PPPS) (Performed 12 months after initial AWV and PPPS )    I have reviewed the patient's medical history in detail and updated the computerized patient record. History   81 yo female here for [address] Notes some concern with short term memory which daughter agrees with. Lives in [facility], but independent in her ADLs. She has been seen by neurology since last visit and reports continued B hand pain. 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 Is doing OT which helps some. Past Medical History:   Diagnosis Date    Arthritis     CAD (coronary artery disease)     Cancer ([facility] [address]     breast and colon    GERD (gastroesophageal reflux disease)     High cholesterol     Hypertension     Urinary incontinence       Past Surgical History:   Procedure Laterality Date    HX APPENDECTOMY      HX BREAST BIOPSY      HX CARPAL TUNNEL RELEASE Left     HX CHOLECYSTECTOMY      HX CHOLECYSTECTOMY      HX CORONARY STENT PLACEMENT  09/09/2016    HX HYSTERECTOMY      HX OTHER SURGICAL      bladder surgeries x3     Current Outpatient Prescriptions   Medication Sig Dispense Refill    cranberry extract 425 mg cap 850 mg.      fluocinolone acetonide oil 0.01 % drop       ketoconazole (NIZORAL) 2 % shampoo       VIT C/VIT E AC/LUT/COPPER/ZINC (PRESERVISION LUTEIN PO) Take  by mouth.  albuterol (PROVENTIL HFA, VENTOLIN HFA, PROAIR HFA) 90 mcg/actuation inhaler Take 2 Puffs by inhalation every six (6) hours as needed for Wheezing. 1 Inhaler 3    inhalational spacing device Use as directed with inhaler 1 Device 0    PREMARIN 0.625 mg/gram vaginal cream APPLY A SMALL AMOUNT TO AFFECTED AREA EVERY NIGHT AS DIRECTED  0    amoxicillin-clavulanate (AUGMENTIN) 500-125 mg per tablet TAKE 1/2 TABLET BY MOUTH ONCE A DAY AS DIRECTED FOR 90 DAYS.  0    clopidogrel (PLAVIX) 75 mg tablet Take 75 mg by mouth daily.       DYMISTA 137-50 mcg/spray spry INSTILL 1 SPRAY TO EACH NOSTRIL TWICE A DAY  12    FOLIC ACID/MULTIVIT-MIN/LUTEIN (CENTRUM SILVER PO) Take  by mouth.  trospium (SANCTURA XL) 60 mg capsule Take 60 mg by mouth Daily (before breakfast).  pravastatin (PRAVACHOL) 40 mg tablet Take 40 mg by mouth nightly.  carvedilol (COREG) 3.125 mg tablet Take 3.125 mg by mouth two (2) times daily (with meals). 0    aspirin delayed-release 81 mg tablet Take  by mouth daily.  LACTOBACILLUS ACIDOPHILUS (PROBIOTIC PO) Take  by mouth daily.  calcium polycarbophil (FIBERCON) 625 mg tablet Take 2 Tabs by Mouth Once a Day.  cetirizine (ZYRTEC) 10 mg tablet 10 mg.      clobetasol 0.05 % spry       NEXIUM 40 mg capsule Take 40 mg by mouth daily.  NITROSTAT 0.4 mg SL tablet 0.4 mg by SubLINGual route every five (5) minutes as needed.        Allergies   Allergen Reactions    Novocain [Procaine] Swelling    Sulfa (Sulfonamide Antibiotics) Rash     Family History   Problem Relation Age of Onset    Stroke Mother     Heart Disease Father      chf    Cancer Sister      Social History   Substance Use Topics    Smoking status: Former Smoker     Types: Cigarettes     Quit date: 7/21/1983    Smokeless tobacco: Never Used    Alcohol use Yes      Comment: rare     Patient Active Problem List   Diagnosis Code    Bulge of cervical disc without myelopathy M50.20    Bulge of thoracic disc without myelopathy M51.24    Bulge of lumbar disc without myelopathy M51.26    Cervical spondylosis without myelopathy M47.812    Thoracic spondylosis without myelopathy M47.814    Lumbosacral spondylosis without myelopathy M47.817    Spinal stenosis of lumbar region M48.06    Cervical neuritis M54.12    Lumbar neuritis M54.16    DDD (degenerative disc disease), cervical M50.30    DDD (degenerative disc disease), thoracic M51.34    DDD (degenerative disc disease), lumbar M51.36    Coronary artery disease involving native coronary artery of native heart without angina pectoris I25.10    Recurrent UTI N39.0    DDD (degenerative disc disease), lumbosacral M51.37    Essential hypertension I10    Other cervical disc displacement, unspecified cervical region M50.20    Other intervertebral disc displacement, thoracic region M51.24    Other intervertebral disc displacement, lumbar region M51.26    Spondylosis without myelopathy or radiculopathy, cervical region M47.812       Depression Risk Factor Screening:     PHQ over the last two weeks 7/25/2017   Little interest or pleasure in doing things Not at all   Feeling down, depressed or hopeless Not at all   Total Score PHQ 2 0     Alcohol Risk Factor Screening: On any occasion during the past 3 months, have you had more than 3 drinks containing alcohol? No    Do you average more than 7 drinks per week? No        Functional Ability and Level of Safety:     Hearing Loss   Moderate; had hearing aids in the past but difficulty putting these in    Activities of Daily Living   Self-care. Requires assistance with: Lives in [place] and no ADLs    Fall Risk   Fall Risk Assessment, last 12 mths 7/25/2017   Able to walk? Yes   Fall in past 12 months? No   Fall with injury? -   Number of falls in past 12 months -   Fall Risk Score -     Abuse Screen   Patient is not abused    Review of Systems   A comprehensive review of systems was negative except for that written in the HPI.     Physical Examination     Evaluation of Cognitive Function:  Mood/affect:  happy  Appearance: age appropriate  Family member/caregiver input: daughter    Visit Vitals    /42 (BP 1 Location: Left arm, BP Patient Position: Sitting)    Pulse 72    Temp 97.2 °F (36.2 °C) (Oral)    Resp 12    Ht 4' 8\" (1.422 m)    Wt 132 lb 3.2 oz (60 kg)    SpO2 97%    BMI 29.64 kg/m2     General appearance: alert, cooperative, no distress, appears stated age  Ears: normal TM's and external ear canals AU  Lungs: clear to auscultation bilaterally  Heart: regular rate and rhythm, S1, S2 normal, no murmur, click, rub or gallop  MMSE: 26/30 (college educated)  Patient Care Team:  Carter Bates MD as PCP - General (Internal Medicine)    Advice/Referrals/Counseling   Education and counseling provided:  Are appropriate based on today's review and evaluation  Cognition      Assessment/Plan       ICD-10-CM ICD-9-CM    1. Routine general medical examination at a health care facility Z00.00 V70.0    2. Screening for alcoholism Z13.89 V79.1    Discussed ACP; she will bring copy of papers to next visit. Discussed memory concerns. MMSE okay today for her age. ? MCI. Pt and her daughter will monitor.    Will check on vaccine status

## 2017-08-28 RX ORDER — PRAVASTATIN SODIUM 40 MG/1
40 TABLET ORAL
Qty: 90 TAB | Refills: 3 | Status: SHIPPED | OUTPATIENT
Start: 2017-08-28 | End: 2018-05-31 | Stop reason: SDUPTHER

## 2017-10-23 ENCOUNTER — OFFICE VISIT (OUTPATIENT)
Dept: INTERNAL MEDICINE CLINIC | Age: 82
End: 2017-10-23

## 2017-10-23 VITALS
DIASTOLIC BLOOD PRESSURE: 51 MMHG | WEIGHT: 132 LBS | TEMPERATURE: 98 F | SYSTOLIC BLOOD PRESSURE: 111 MMHG | BODY MASS INDEX: 29.69 KG/M2 | OXYGEN SATURATION: 95 % | HEART RATE: 75 BPM | HEIGHT: 56 IN | RESPIRATION RATE: 12 BRPM
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
## 2017-10-23 DIAGNOSIS — I25.10 CORONARY ARTERY DISEASE WITHOUT ANGINA PECTORIS, UNSPECIFIED VESSEL OR LESION TYPE, UNSPECIFIED WHETHER NATIVE OR TRANSPLANTED HEART: ICD-10-CM

## 2017-10-23 RX ORDER — CLOBETASOL PROPIONATE 0.05 G/ML
SPRAY TOPICAL
Refills: 2 | COMMUNITY
Start: 2017-07-19 | End: 2020-06-05

## 2017-10-23 RX ORDER — NITROGLYCERIN 0.4 MG/1
TABLET SUBLINGUAL
COMMUNITY
Start: 2017-10-05

## 2017-10-23 NOTE — PATIENT INSTRUCTIONS

## 2017-10-23 NOTE — MR AVS SNAPSHOT
Visit Information Date & Time Provider Department Dept. Phone Encounter #  
 10/23/2017  1:15 PM Mary Grimm [facility] [email or web address] [id number] [id number] Follow-up Instructions Return in about 3 months (around 1/23/2018), or if symptoms worsen or fail to improve. Your Appointments 1/25/2018 11:30 AM  
Office Visit with MD RUDOLPH Mora [facility]) Appt Note: 6 month f/u HTN  
 [address]  
  
   
 [address] [facility] Upcoming Health Maintenance Date Due DTaP/Tdap/Td series (1 - Tdap) 4/8/1947 ZOSTER VACCINE AGE 60> 2/8/1986 GLAUCOMA SCREENING Q2Y 4/8/1991 Pneumococcal 65+ Low/Medium Risk (1 of 2 - PCV13) 4/8/1991 OSTEOPOROSIS SCREENING (DEXA) 1/1/2018* MEDICARE YEARLY EXAM 7/26/2018 *Topic was postponed. The date shown is not the original due date. Allergies as of 10/23/2017  Review Complete On: 10/23/2017 By: Brent Tenorio LPN Severity Noted Reaction Type Reactions Novocain [Procaine]  07/21/2016    Swelling Sulfa (Sulfonamide Antibiotics)  07/21/2016    Rash Current Immunizations  Never Reviewed Name Date Influenza High Dose Vaccine PF 9/26/2017, 10/7/2016 Influenza Vaccine 10/10/2015 12:00 AM  
  
 Not reviewed this visit You Were Diagnosed With   
  
 Codes Comments Fall, subsequent encounter    -  Primary ICD-10-CM: W19. Bella Sharif ICD-9-CM: V58.89, E888.9 Coronary artery disease without angina pectoris, unspecified vessel or lesion type, unspecified whether native or transplanted heart     ICD-10-CM: I25.10 ICD-9-CM: 414.00 Vitals BP Pulse Temp Resp Height(growth percentile) Weight(growth percentile) 111/51 (BP 1 Location: Left arm, BP Patient Position: Sitting) 75 98 °F (36.7 °C) (Oral) 12 4' 8\" (1.422 m) 132 lb (59.9 kg) SpO2 BMI OB Status Smoking Status 95% 29.59 kg/m2 Postmenopausal Former Smoker Vitals History BMI and BSA Data Body Mass Index Body Surface Area  
 29.59 kg/m 2 1.54 m 2 Preferred Pharmacy Pharmacy Name Phone [facility], Florin De [phone] Your Updated Medication List  
  
   
This list is accurate as of: 10/23/17  1:57 PM.  Always use your most recent med list.  
  
  
  
  
 albuterol 90 mcg/actuation inhaler Commonly known as:  PROVENTIL HFA, VENTOLIN HFA, PROAIR HFA Take 2 Puffs by inhalation every six (6) hours as needed for Wheezing. amoxicillin-clavulanate 500-125 mg per tablet Commonly known as:  AUGMENTIN  
TAKE 1/2 TABLET BY MOUTH ONCE A DAY AS DIRECTED FOR 90 DAYS. aspirin delayed-release 81 mg tablet Take 81 mg by mouth daily. 2 tabs  
  
 carvedilol 3.125 mg tablet Commonly known as:  Gianna Spry Take 3.125 mg by mouth two (2) times daily (with meals). CENTRUM SILVER PO Take  by mouth.  
  
 clobetasol 0.05 % Spry APPLY TO AFFECTED AREA EVERY NIGHTAT BEDTIME FOR 2 WEEKS THEN AS NEEDED TO SCALP  
  
 clopidogrel 75 mg Tab Commonly known as:  PLAVIX Take 75 mg by mouth daily. DYMISTA 137-50 mcg/spray [place] Generic drug:  azelastine-fluticasone INSTILL 1 SPRAY TO EACH NOSTRIL TWICE A DAY  
  
 fluocinolone acetonide oil 0.01 % Drop FLUZONE HIGH-DOSE 2017-18 (PF) Syrg injection Generic drug:  influenza vaccine 2017-18 (65 yrs+)(PF)  
TO BE ADMINISTERED BY PHARMACIST FOR IMMUNIZATION  
  
 inhalational spacing device Use as directed with inhaler  
  
 ketoconazole 2 % shampoo Commonly known as:  NIZORAL  
  
 nitroglycerin 0.4 mg SL tablet Commonly known as:  NITROSTAT One tab sublingual every 5 min for relief of pain x 3 as necessary. Report to ER if pain lasts longer than 30 min  
  
 pravastatin 40 mg tablet Commonly known as:  PRAVACHOL  
 Take 1 Tab by mouth nightly. PREMARIN 0.625 mg/gram vaginal cream  
Generic drug:  conjugated estrogens APPLY A SMALL AMOUNT TO AFFECTED AREA EVERY NIGHT AS DIRECTED PRESERVISION LUTEIN PO Take  by mouth. PROBIOTIC PO Take  by mouth daily. trospium 60 mg capsule Commonly known as:  SANCTURA XL Take 60 mg by mouth Daily (before breakfast). Follow-up Instructions Return in about 3 months (around 1/23/2018), or if symptoms worsen or fail to improve. To-Do List   
 10/23/2017 Lab:  LIPID PANEL   
  
 10/23/2017 Lab:  METABOLIC PANEL, COMPREHENSIVE Patient Instructions Preventing Falls: Care Instructions Your Care Instructions Getting around your home safely can be a challenge if you have injuries or health problems that make it easy for you to fall. Loose rugs and furniture in walkways are among the dangers for many older people who have problems walking or who have poor eyesight. People who have conditions such as arthritis, osteoporosis, or dementia also have to be careful not to fall. You can make your home safer with a few simple measures. Follow-up care is a key part of your treatment and safety. Be sure to make and go to all appointments, and call your doctor if you are having problems. It's also a good idea to know your test results and keep a list of the medicines you take. How can you care for yourself at home? Taking care of yourself · You may get dizzy if you do not drink enough water. To prevent dehydration, drink plenty of fluids, enough so that your urine is light yellow or clear like water. Choose water and other caffeine-free clear liquids. If you have kidney, heart, or liver disease and have to limit fluids, talk with your doctor before you increase the amount of fluids you drink. · Exercise regularly to improve your strength, muscle tone, and balance. Walk if you can. Swimming may be a good choice if you cannot walk easily. · Have your vision and hearing checked each year or any time you notice a change. If you have trouble seeing and hearing, you might not be able to avoid objects and could lose your balance. · Know the side effects of the medicines you take. Ask your doctor or pharmacist whether the medicines you take can affect your balance. Sleeping pills or sedatives can affect your balance. · Limit the amount of alcohol you drink. Alcohol can impair your balance and other senses. · Ask your doctor whether calluses or corns on your feet need to be removed. If you wear loose-fitting shoes because of calluses or corns, you can lose your balance and fall. · Talk to your doctor if you have numbness in your feet. Preventing falls at home · Remove raised doorway thresholds, throw rugs, and clutter. Repair loose carpet or raised areas in the floor. · Move furniture and electrical cords to keep them out of walking paths. · Use nonskid floor wax, and wipe up spills right away, especially on ceramic tile floors. · If you use a walker or cane, put rubber tips on it. If you use crutches, clean the bottoms of them regularly with an abrasive pad, such as steel wool. · Keep your house well lit, especially [place], and outside walkways. Use night-lights in areas such as hallways and bathrooms. Add extra light switches or use remote switches (such as switches that go on or off when you clap your hands) to make it easier to turn lights on if you have to get up during the night. · Install sturdy handrails on stairways. · Move items in your cabinets so that the things you use a lot are on the lower shelves (about waist level). · Keep a cordless phone and a flashlight with new batteries by your bed. If possible, put a phone in each of the main rooms of your house, or carry a cell phone in case you fall and cannot reach a phone.  Or, you can wear a device around your neck or wrist. You push a button that sends a signal for help. · Wear low-heeled shoes that fit well and give your feet good support. Use footwear with nonskid soles. Check the heels and soles of your shoes for wear. Repair or replace worn heels or soles. · Do not wear socks without shoes on wood floors. · Walk on the grass when the sidewalks are slippery. If you live in an area that gets snow and ice in the winter, sprinkle salt on slippery steps and sidewalks. Preventing falls in the bath · Install grab bars and nonskid mats inside and outside your shower or tub and near the toilet and sinks. · Use shower chairs and bath benches. · Use a hand-held shower head that will allow you to sit while showering. · Get into a tub or shower by putting the weaker leg in first. Get out of a tub or shower with your strong side first. 
· Repair loose toilet seats and consider installing a raised toilet seat to make getting on and off the toilet easier. · Keep your bathroom door unlocked while you are in the shower. Where can you learn more? Go to http://radha-abdiaziz.info/. Enter [phone] in the search box to learn more about \"Preventing Falls: Care Instructions. \" Current as of: August 4, 2016 Content Version: 11.3 © 5154-1226 [employer]. Care instructions adapted under license by [employer] (which disclaims liability or warranty for this information). If you have questions about a medical condition or this instruction, always ask your healthcare professional. [place] [address] any warranty or liability for your use of this information. Introducing [facility]! Dear Brittani Dominguez: Thank you for requesting a [employer] account. Our records indicate that you already have an active [employer] account. You can access your account anytime at [email or web address] Did you know that you can access your hospital and ER discharge instructions at any time in "[employer]? You can also review all of your test results from your hospital stay or ER visit. Additional Information If you have questions, please visit the Frequently Asked Questions section of the "[employer] website at [email or web address] Remember, "[employer] is NOT to be used for urgent needs. For medical emergencies, dial 911. Now available from your iPhone and Android! Please provide this summary of care documentation to your next provider. Your primary care clinician is listed as Raul De. If you have any questions after today's visit, please call [phone].

## 2017-10-23 NOTE — PROGRESS NOTES
HISTORY OF PRESENT ILLNESS  Leticia Duarte is a 80 y.o. female. HPI Comments: 81 yo female here for f/u from ED visit due to fall two days ago. She was staying at her daughter's home and was attempting to get out of bed when she rolled out hitting her forehead on bedside table. No LOC, dizziness. She was taken to ED with CT head without acute changes. She has some pain at site of laceration with bruising around both eyes. No HA. No confusion noted by family. H/o CAD followed by cardiology. No longer on Plavix. Needs lipids rechecked. Review of Systems   Constitutional: Negative for chills, fever and weight loss. HENT: Negative for congestion and ear pain. Eyes: Negative for blurred vision and pain. Respiratory: Negative for cough and shortness of breath. Cardiovascular: Negative for chest pain, palpitations and leg swelling. Gastrointestinal: Negative for nausea and vomiting. Genitourinary: Negative for frequency and urgency. Musculoskeletal: Negative for joint pain and myalgias. Neurological: Negative for dizziness, tingling and headaches. Psychiatric/Behavioral: Negative for depression. The patient is not nervous/anxious. Past Medical History:   Diagnosis Date    Arthritis     CAD (coronary artery disease)     Cancer ([facility] [address]     breast and colon    GERD (gastroesophageal reflux disease)     High cholesterol     Hypertension     Urinary incontinence      Current Outpatient Prescriptions on File Prior to Visit   Medication Sig Dispense Refill    pravastatin (PRAVACHOL) 40 mg tablet Take 1 Tab by mouth nightly. 90 Tab 3    fluocinolone acetonide oil 0.01 % drop       ketoconazole (NIZORAL) 2 % shampoo       VIT C/VIT E AC/LUT/COPPER/ZINC (PRESERVISION LUTEIN PO) Take  by mouth.  albuterol (PROVENTIL HFA, VENTOLIN HFA, PROAIR HFA) 90 mcg/actuation inhaler Take 2 Puffs by inhalation every six (6) hours as needed for Wheezing.  1 Inhaler 3    inhalational spacing device Use as directed with inhaler 1 Device 0    PREMARIN 0.625 mg/gram vaginal cream APPLY A SMALL AMOUNT TO AFFECTED AREA EVERY NIGHT AS DIRECTED  0    amoxicillin-clavulanate (AUGMENTIN) 500-125 mg per tablet TAKE 1/2 TABLET BY MOUTH ONCE A DAY AS DIRECTED FOR 90 DAYS.  0    DYMISTA 137-50 mcg/spray spry INSTILL 1 SPRAY TO EACH NOSTRIL TWICE A DAY  12    FOLIC ACID/MULTIVIT-MIN/LUTEIN (CENTRUM SILVER PO) Take  by mouth.  trospium (SANCTURA XL) 60 mg capsule Take 60 mg by mouth Daily (before breakfast).  carvedilol (COREG) 3.125 mg tablet Take 3.125 mg by mouth two (2) times daily (with meals). 0    aspirin delayed-release 81 mg tablet Take 81 mg by mouth daily. 2 tabs      LACTOBACILLUS ACIDOPHILUS (PROBIOTIC PO) Take  by mouth daily.  clopidogrel (PLAVIX) 75 mg tablet Take 75 mg by mouth daily. No current facility-administered medications on file prior to visit. Social History   Substance Use Topics    Smoking status: Former Smoker     Types: Cigarettes     Quit date: 7/21/1983    Smokeless tobacco: Never Used    Alcohol use Yes      Comment: rare     Physical Exam   Constitutional: She appears well-developed and well-nourished. No distress. /51 (BP 1 Location: Left arm, BP Patient Position: Sitting)  Pulse 75  Temp 98 °F (36.7 °C) (Oral)   Resp 12  Ht 4' 8\" (1.422 m)  Wt 132 lb (59.9 kg)  SpO2 95%  BMI 29.59 kg/m2     HENT:   Head: Head is with raccoon's eyes and with laceration. Eyes: EOM are normal. Right eye exhibits no discharge. Left eye exhibits no discharge. Right conjunctiva has a hemorrhage (subconjunctival on R). No scleral icterus. Neck: Neck supple. Cardiovascular: Normal rate, regular rhythm and normal heart sounds. Exam reveals no gallop and no friction rub. No murmur heard. Pulmonary/Chest: Effort normal and breath sounds normal. No respiratory distress. She has no wheezes. She has no rales.    Musculoskeletal: She exhibits no edema or tenderness. Lymphadenopathy:     She has no cervical adenopathy. Neurological: She is alert. She exhibits normal muscle tone. Skin: Skin is warm and dry. Psychiatric: She has a normal mood and affect. Lab Results   Component Value Date/Time    Sodium 141 02/22/2017 02:24 PM    Potassium 4.3 02/22/2017 02:24 PM    Chloride 104 02/22/2017 02:24 PM    CO2 30 02/22/2017 02:24 PM    Anion gap 7 02/22/2017 02:24 PM    Glucose 109 02/22/2017 02:24 PM    BUN 16 02/22/2017 02:24 PM    Creatinine 0.85 02/22/2017 02:24 PM    BUN/Creatinine ratio 19 02/22/2017 02:24 PM    GFR est AA >60 02/22/2017 02:24 PM    GFR est non-AA >60 02/22/2017 02:24 PM    Calcium 10.1 02/22/2017 02:24 PM    Bilirubin, total 0.4 02/22/2017 02:24 PM    AST (SGOT) 16 02/22/2017 02:24 PM    Alk. phosphatase 59 02/22/2017 02:24 PM    Protein, total 6.6 02/22/2017 02:24 PM    Albumin 3.7 02/22/2017 02:24 PM    Globulin 2.9 02/22/2017 02:24 PM    A-G Ratio 1.3 02/22/2017 02:24 PM    ALT (SGPT) 17 02/22/2017 02:24 PM     Lab Results   Component Value Date/Time    WBC 6.5 02/22/2017 02:24 PM    HGB 13.2 02/22/2017 02:24 PM    HCT 41.1 02/22/2017 02:24 PM    PLATELET 469 09/65/9514 02:24 PM    MCV 93.4 02/22/2017 02:24 PM     ASSESSMENT and PLAN    ICD-10-CM ICD-9-CM    1. Fall, subsequent encounter W19. XXXD V58.89      E888.9    2. Coronary artery disease without angina pectoris, unspecified vessel or lesion type, unspecified whether native or transplanted heart M57.53 500.77 METABOLIC PANEL, COMPREHENSIVE      LIPID PANEL     Discussed return precautions. Family will remove sutures this weekend. Repeat labs ordered to assess lipids. She will return for this fasting.

## 2017-10-23 NOTE — PROGRESS NOTES
Pt presents today for ED/F/U head injury due a fall on 10/21/17     Pt preferred language for health care discussion is english. Is someone accompanying this pt? Yes her daughter    Is the patient using any DME equipment during OV? Yes a walker    Depression Screening completed. yes    Abuse Screening completed. Yes    Health Maintenance reviewed and discussed per provider. Yes    Advance Directive:  1. Do you have an advance directive in place? Patient Reply: Yes      Coordination of Care:  1. Have you been to the ER, urgent care clinic since your last visit? Hospitalized since your last visit? Yes    2. Have you seen or consulted any other health care providers outside of the [address] [place] [address] since your last visit? Include any pap smears or colon screening.  Yes see care team.

## 2017-10-31 ENCOUNTER — HOSPITAL ENCOUNTER (OUTPATIENT)
Dept: LAB | Age: 82
Discharge: HOME | End: 2017-10-31
Payer: MEDICARE

## 2017-10-31 DIAGNOSIS — I25.10 CORONARY ARTERY DISEASE WITHOUT ANGINA PECTORIS, UNSPECIFIED VESSEL OR LESION TYPE, UNSPECIFIED WHETHER NATIVE OR TRANSPLANTED HEART: ICD-10-CM

## 2017-10-31 LAB
ALBUMIN SERPL-MCNC: 3.7 G/DL (ref 3.4–5)
ALBUMIN/GLOB SERPL: 1.2 {RATIO} (ref 0.8–1.7)
ALP SERPL-CCNC: 68 U/L (ref 45–117)
ALT SERPL-CCNC: 20 U/L (ref 13–56)
ANION GAP SERPL CALC-SCNC: 8 MMOL/L (ref 3–18)
AST SERPL-CCNC: 17 U/L (ref 15–37)
BILIRUB SERPL-MCNC: 0.6 MG/DL (ref 0.2–1)
BUN SERPL-MCNC: 17 MG/DL (ref 7–18)
BUN/CREAT SERPL: 22 (ref 12–20)
CALCIUM SERPL-MCNC: 9.5 MG/DL (ref 8.5–10.1)
CHLORIDE SERPL-SCNC: 105 MMOL/L (ref 100–108)
CHOLEST SERPL-MCNC: 184 MG/DL
CO2 SERPL-SCNC: 29 MMOL/L (ref 21–32)
CREAT SERPL-MCNC: 0.78 MG/DL (ref 0.6–1.3)
GLOBULIN SER CALC-MCNC: 3.1 G/DL (ref 2–4)
GLUCOSE SERPL-MCNC: 132 MG/DL (ref 74–99)
HDLC SERPL-MCNC: 48 MG/DL (ref 40–60)
HDLC SERPL: 3.8 {RATIO} (ref 0–5)
LDLC SERPL CALC-MCNC: 107.8 MG/DL (ref 0–100)
LIPID PROFILE,FLP: ABNORMAL
POTASSIUM SERPL-SCNC: 4.8 MMOL/L (ref 3.5–5.5)
PROT SERPL-MCNC: 6.8 G/DL (ref 6.4–8.2)
SODIUM SERPL-SCNC: 142 MMOL/L (ref 136–145)
TRIGL SERPL-MCNC: 141 MG/DL (ref ?–150)
VLDLC SERPL CALC-MCNC: 28.2 MG/DL

## 2017-10-31 PROCEDURE — 36415 COLL VENOUS BLD VENIPUNCTURE: CPT | Performed by: INTERNAL MEDICINE

## 2017-10-31 PROCEDURE — 80053 COMPREHEN METABOLIC PANEL: CPT | Performed by: INTERNAL MEDICINE

## 2017-10-31 PROCEDURE — 80061 LIPID PANEL: CPT | Performed by: INTERNAL MEDICINE

## 2017-11-03 ENCOUNTER — LAB ONLY (OUTPATIENT)
Dept: INTERNAL MEDICINE CLINIC | Age: 82
End: 2017-11-03

## 2017-11-14 ENCOUNTER — TELEPHONE (OUTPATIENT)
Dept: INTERNAL MEDICINE CLINIC | Age: 82
End: 2017-11-14

## 2017-11-14 DIAGNOSIS — R29.6 RECURRENT FALLS: Primary | ICD-10-CM

## 2017-11-14 NOTE — TELEPHONE ENCOUNTER
Patient's daughter called in requesting an order for PT for balance. States he mother balance is getting worse and had a fall last week again. Will be doing in home PT with [facility].     [facility] - [phone]

## 2017-11-16 ENCOUNTER — TELEPHONE (OUTPATIENT)
Dept: INTERNAL MEDICINE CLINIC | Age: 82
End: 2017-11-16

## 2017-11-17 NOTE — TELEPHONE ENCOUNTER
Ginette from [facility] called to check on status of Physical Therapy order faxed to PCP office for signature. Any questions or concerns please contact at [phone].

## 2017-11-20 ENCOUNTER — TELEPHONE (OUTPATIENT)
Dept: INTERNAL MEDICINE CLINIC | Age: 82
End: 2017-11-20

## 2017-11-20 NOTE — TELEPHONE ENCOUNTER
Ginette Cordero is calling to check the status of the order that was requested last week. According to the last note Izzy Gayle faxed the paperwork on 11/17/17.     Jaki Varner - [phone]

## 2017-11-20 NOTE — TELEPHONE ENCOUNTER
Called spoke with Sherlyn Jung at [facility] verified with two identifiers he stated they got the order as we where speaking.   Closing chart

## 2017-12-04 ENCOUNTER — OFFICE VISIT (OUTPATIENT)
Dept: INTERNAL MEDICINE CLINIC | Age: 82
End: 2017-12-04

## 2017-12-04 VITALS
HEART RATE: 107 BPM | BODY MASS INDEX: 30.14 KG/M2 | WEIGHT: 134 LBS | DIASTOLIC BLOOD PRESSURE: 61 MMHG | OXYGEN SATURATION: 94 % | SYSTOLIC BLOOD PRESSURE: 155 MMHG | HEIGHT: 56 IN | TEMPERATURE: 97.8 F | RESPIRATION RATE: 14 BRPM

## 2017-12-04 DIAGNOSIS — M54.9 OTHER ACUTE BACK PAIN: Primary | ICD-10-CM

## 2017-12-04 DIAGNOSIS — R29.6 RECURRENT FALLS: ICD-10-CM

## 2017-12-04 RX ORDER — TRAMADOL HYDROCHLORIDE 50 MG/1
50 TABLET ORAL
Qty: 30 TAB | Refills: 0 | Status: SHIPPED | OUTPATIENT
Start: 2017-12-04 | End: 2018-06-21

## 2017-12-04 NOTE — PATIENT INSTRUCTIONS
Tramadol (By mouth)   Tramadol (TRAM-a-dol)  Treats moderate to severe pain. This medicine is a narcotic pain reliever. Brand Name(s): ConZip, FusePaq Synapryn, Ultram, Ultram ER, traMADol HCl   There may be other brand names for this medicine. When This Medicine Should Not Be Used: This medicine is not right for everyone. Do not use if you had an allergic reaction to tramadol or other narcotic medicine, or if you have stomach or bowel blockage (including paralytic ileus). How to Use This Medicine:   Long Acting Capsule, Liquid, Tablet, Dissolving Tablet, Long Acting Tablet  · Take your medicine as directed. Your dose may need to be changed several times to find what works best for you. · Make sure your hands are dry before you handle the disintegrating tablet. Peel back the foil from the blister pack, then remove the tablet. Do not push the tablet through the foil. Place the tablet in your mouth. After it has melted, swallow or take a drink of water. · Swallow the extended-release tablet whole. Do not crush, break, or chew it. · Drink plenty of liquids to help avoid constipation. · This medicine should come with a Medication Guide. Ask your pharmacist for a copy if you do not have one. · Missed dose: Take a dose as soon as you remember. If it is almost time for your next dose, wait until then and take a regular dose. Do not take extra medicine to make up for a missed dose. · Store the medicine in a closed container at room temperature, away from heat, moisture, and direct light. Drugs and Foods to Avoid:   Ask your doctor or pharmacist before using any other medicine, including over-the-counter medicines, vitamins, and herbal products. · Do not use this medicine if you are using or have used an MAO inhibitor within the past 14 days. · Some medicines can affect how tramadol works.  Tell your doctor if you are using any of the following:  ¨ Carbamazepine, cyclobenzaprine, digoxin, erythromycin, ketoconazole, lithium, mirtazapine, phenytoin, promethazine, rifampin, ritonavir, quinidine, or trazodone  ¨ Blood thinner (including warfarin)  ¨ Diuretic (water pill)  ¨ Medicine to treat depression (including bupropion, fluoxetine, paroxetine, quinidine)  ¨ Phenothiazine medicine  ¨ Triptan medicine for migraine headaches  · Tell your doctor if you use anything else that makes you sleepy. Some examples are allergy medicine, narcotic pain medicine, and alcohol. Tell your doctor if you are using a muscle relaxer. · Do not drink alcohol while you are using this medicine. Warnings While Using This Medicine:   · Tell your doctor if you are pregnant or breastfeeding, or if you have kidney disease, liver disease (including cirrhosis), gallstones, lung or breathing problems, pancreas problems, or a history of head injury, seizures, drug addiction, or depression or similar emotional problems. Tell your doctor if you have phenylketonuria. · This medicine may cause the following problems:  ¨ High risk of overdose, which can lead to death  ¨ Respiratory depression (serious breathing problem that can be life-threatening)  ¨ Serotonin syndrome (when used with certain medicines)  ¨ Unusual change in mood or behavior  · This medicine may make you dizzy, drowsy, or lightheaded. Do not drive or doing anything else that could be dangerous until you know how this medicine affects you. · This medicine can be habit-forming. Do not use more than your prescribed dose. Call your doctor if you think your medicine is not working. · Do not stop using this medicine suddenly. Your doctor will need to slowly decrease your dose before you stop it completely. · Tell any doctor or dentist who treats you that you are using this medicine. · This medicine may cause constipation, especially with long-term use. Ask your doctor if you should use a laxative to prevent and treat constipation. · This medicine could cause infertility.  Talk with your doctor before using this medicine if you plan to have children. · Keep all medicine out of the reach of children. Never share your medicine with anyone. Possible Side Effects While Using This Medicine:   Call your doctor right away if you notice any of these side effects:  · Allergic reaction: Itching or hives, swelling in your face or hands, swelling or tingling in your mouth or throat, chest tightness, trouble breathing  · Anxiety, restlessness, fast heartbeat, fever, sweating, muscle spasms, nausea, vomiting, diarrhea, seeing or hearing things that are not there  · Blistering, peeling, red skin rash  · Blue lips, fingernails, or skin  · Extreme dizziness, drowsiness, or weakness, shallow breathing, slow heartbeat, seizures, and cold, clammy skin  · Lightheadedness, dizziness, fainting  · Seizures  · Unusual mood or behavior, thoughts of killing yourself or others  · Trouble breathing  If you notice these less serious side effects, talk with your doctor:   · Constipation, loss of appetite, stomach upset  · Dry mouth  · Headache  If you notice other side effects that you think are caused by this medicine, tell your doctor. Call your doctor for medical advice about side effects. You may report side effects to FDA at 9-862-FDA-6107  © 2017 [facility] Information is for End User's use only and may not be sold, redistributed or otherwise used for commercial purposes. The above information is an  only. It is not intended as medical advice for individual conditions or treatments. Talk to your doctor, nurse or pharmacist before following any medical regimen to see if it is safe and effective for you.

## 2017-12-04 NOTE — PROGRESS NOTES
HISTORY OF PRESENT ILLNESS  Madeline Gallagher is a 80 y.o. female. HPI Comments: 81 yo female here for evaluation of back pain follow fall 6 days ago. She lost her balance while standing trying on clothing and fell hitting R side of upper back. She feels pain has worsened since that time. Has taken tylenol which does not help. She denies CP, SOB. Pain does not increase with deep inspiration. Does increase with movement. Has not been able to sleep in bed due to pain. Review of Systems   Constitutional: Negative for chills, fever and weight loss. HENT: Negative for congestion and ear pain. Eyes: Negative for blurred vision and pain. Respiratory: Negative for cough and shortness of breath. Cardiovascular: Negative for chest pain, palpitations and leg swelling. Gastrointestinal: Negative for nausea and vomiting. Genitourinary: Negative for frequency and urgency. Musculoskeletal: Positive for falls. Negative for joint pain and myalgias. Skin: Negative for itching and rash. Neurological: Negative for dizziness, tingling and headaches. Psychiatric/Behavioral: Negative for depression. The patient is not nervous/anxious. Past Medical History:   Diagnosis Date    Arthritis     CAD (coronary artery disease)     Cancer ([facility] [address]     breast and colon    GERD (gastroesophageal reflux disease)     High cholesterol     Hypertension     Urinary incontinence      Current Outpatient Prescriptions on File Prior to Visit   Medication Sig Dispense Refill    FLUZONE HIGH-DOSE 2017-18, PF, syrg injection TO BE ADMINISTERED BY PHARMACIST FOR IMMUNIZATION  0    clobetasol 0.05 % spry APPLY TO AFFECTED AREA EVERY NIGHTAT BEDTIME FOR 2 WEEKS THEN AS NEEDED TO SCALP  2    nitroglycerin (NITROSTAT) 0.4 mg SL tablet One tab sublingual every 5 min for relief of pain x 3 as necessary.   Report to ER if pain lasts longer than 30 min      fluocinolone acetonide oil 0.01 % drop       ketoconazole (NIZORAL) 2 % shampoo       VIT C/VIT E AC/LUT/COPPER/ZINC (PRESERVISION LUTEIN PO) Take  by mouth.  albuterol (PROVENTIL HFA, VENTOLIN HFA, PROAIR HFA) 90 mcg/actuation inhaler Take 2 Puffs by inhalation every six (6) hours as needed for Wheezing. 1 Inhaler 3    inhalational spacing device Use as directed with inhaler 1 Device 0    PREMARIN 0.625 mg/gram vaginal cream APPLY A SMALL AMOUNT TO AFFECTED AREA EVERY NIGHT AS DIRECTED  0    amoxicillin-clavulanate (AUGMENTIN) 500-125 mg per tablet TAKE 1/2 TABLET BY MOUTH ONCE A DAY AS DIRECTED FOR 90 DAYS.  0    DYMISTA 137-50 mcg/spray spry INSTILL 1 SPRAY TO EACH NOSTRIL TWICE A DAY  12    FOLIC ACID/MULTIVIT-MIN/LUTEIN (CENTRUM SILVER PO) Take  by mouth.  trospium (SANCTURA XL) 60 mg capsule Take 60 mg by mouth Daily (before breakfast).  carvedilol (COREG) 3.125 mg tablet Take 3.125 mg by mouth two (2) times daily (with meals). 0    aspirin delayed-release 81 mg tablet Take 81 mg by mouth daily. 2 tabs      LACTOBACILLUS ACIDOPHILUS (PROBIOTIC PO) Take  by mouth daily.  pravastatin (PRAVACHOL) 40 mg tablet Take 1 Tab by mouth nightly. 90 Tab 3     No current facility-administered medications on file prior to visit. Social History   Substance Use Topics    Smoking status: Former Smoker     Types: Cigarettes     Quit date: 7/21/1983    Smokeless tobacco: Never Used    Alcohol use Yes      Comment: rare     Physical Exam   Constitutional: She appears well-developed and well-nourished. No distress. /61 (BP 1 Location: Left arm, BP Patient Position: Sitting)  Pulse (!) 107  Temp 97.8 °F (36.6 °C) (Oral)   Resp 14  Ht 4' 8\" (1.422 m)  Wt 134 lb (60.8 kg)  SpO2 94%  BMI 30.04 kg/m2     Eyes: EOM are normal. Right eye exhibits no discharge. Left eye exhibits no discharge. No scleral icterus. Neck: Neck supple. Cardiovascular: Normal rate, regular rhythm and normal heart sounds. Exam reveals no gallop and no friction rub.     No murmur heard. Pulmonary/Chest: Effort normal and breath sounds normal. No respiratory distress. She has no wheezes. She has no rales. Musculoskeletal: She exhibits tenderness. She exhibits no edema. Arms:  Lymphadenopathy:     She has no cervical adenopathy. Neurological: She is alert. She exhibits normal muscle tone. Skin: Skin is warm and dry. Psychiatric: She has a normal mood and affect. Lab Results   Component Value Date/Time    Sodium 142 10/31/2017 11:04 AM    Potassium 4.8 10/31/2017 11:04 AM    Chloride 105 10/31/2017 11:04 AM    CO2 29 10/31/2017 11:04 AM    Anion gap 8 10/31/2017 11:04 AM    Glucose 132 10/31/2017 11:04 AM    BUN 17 10/31/2017 11:04 AM    Creatinine 0.78 10/31/2017 11:04 AM    BUN/Creatinine ratio 22 10/31/2017 11:04 AM    GFR est AA >60 10/31/2017 11:04 AM    GFR est non-AA >60 10/31/2017 11:04 AM    Calcium 9.5 10/31/2017 11:04 AM    Bilirubin, total 0.6 10/31/2017 11:04 AM    AST (SGOT) 17 10/31/2017 11:04 AM    Alk. phosphatase 68 10/31/2017 11:04 AM    Protein, total 6.8 10/31/2017 11:04 AM    Albumin 3.7 10/31/2017 11:04 AM    Globulin 3.1 10/31/2017 11:04 AM    A-G Ratio 1.2 10/31/2017 11:04 AM    ALT (SGPT) 20 10/31/2017 11:04 AM     Lab Results   Component Value Date/Time    WBC 6.5 02/22/2017 02:24 PM    HGB 13.2 02/22/2017 02:24 PM    HCT 41.1 02/22/2017 02:24 PM    PLATELET 806 72/19/3176 02:24 PM    MCV 93.4 02/22/2017 02:24 PM     ASSESSMENT and PLAN    ICD-10-CM ICD-9-CM    1. Other acute back pain M54.9 724.5 XR RIBS BI W PA CHEST 4 VS   2. Recurrent falls R29.6 V15.88      Will check xrays today given point TTP. Will try tramadol prn for severe pain. Can continue tylenol. Okay to use short term nsaids.    Continue with PT

## 2017-12-04 NOTE — PROGRESS NOTES
ROOM # 12    Patient's daughter says she fell on 11/27/2017    Arcadio Norman presents today for   Chief Complaint   Patient presents with   Zuhair Hock Fall       Arcadio Norman preferred language for health care discussion is english/other. Is someone accompanying this pt? Yes, her daughter    Is the patient using any DME equipment during OV? No    Depression Screening:  PHQ over the last two weeks 10/23/2017 7/25/2017 11/1/2016   Little interest or pleasure in doing things Several days Not at all More than half the days   Feeling down, depressed or hopeless Several days Not at all Not at all   Total Score PHQ 2 2 0 2       Learning Assessment:  No flowsheet data found. Abuse Screening:  Abuse Screening Questionnaire 10/23/2017   Do you ever feel afraid of your partner? N   Are you in a relationship with someone who physically or mentally threatens you? N   Is it safe for you to go home? Y       Fall Risk  Fall Risk Assessment, last 12 mths 12/4/2017 10/23/2017 7/25/2017 2/22/2017 11/1/2016 7/21/2016   Able to walk? Yes Yes Yes Yes Yes Yes   Fall in past 12 months? Yes Yes No Yes Yes No   Fall with injury? - Yes - Yes No -   Number of falls in past 12 months 3 1 - 1 1 -   Fall Risk Score - 2 - 2 1 -       Health Maintenance reviewed and discussed per provider. Yes    Arcadio Norman is due for DTap, Zoster, Glaucoma screening . Please order/place referral if appropria    Advance Directive:  1. Do you have an advance directive in place? Patient Reply: No    2. If not, would you like material regarding how to put one in place? Patient Reply: No, patient says she has filled out paperwork before with us    2. Per patient no changes to their ACP contact No.      Coordination of Care:  1. Have you been to the ER, urgent care clinic since your last visit? Hospitalized since your last visit? No    2.  Have you seen or consulted any other health care providers outside of the [address] [place] [address] since your last visit? Include any pap smears or colon screening.  No

## 2017-12-04 NOTE — ACP (ADVANCE CARE PLANNING)
Advance Directive:  1. Do you have an advance directive in place? Patient Reply: No    2. If not, would you like material regarding how to put one in place? Patient Reply: No, patient says she has filled out paperwork before with us    2.   Per patient no changes to their ACP contact No.

## 2017-12-04 NOTE — MR AVS SNAPSHOT
Visit Information Date & Time Provider Department Dept. Phone Encounter #  
 12/4/2017  1:45 PM Brooklyn Wilkins, [address] [phone] [id number] Follow-up Instructions Return if symptoms worsen or fail to improve. Your Appointments 1/25/2018 11:30 AM  
Office Visit with MD RUDOLPH Sheth [facility]) Appt Note: 6 month f/u HTN  
 [address]  
  
   
 [address] [facility] Upcoming Health Maintenance Date Due DTaP/Tdap/Td series (1 - Tdap) 4/8/1947 ZOSTER VACCINE AGE 60> 2/8/1986 GLAUCOMA SCREENING Q2Y 4/8/1991 Pneumococcal 65+ Low/Medium Risk (1 of 2 - PCV13) 4/8/1991 OSTEOPOROSIS SCREENING (DEXA) 1/1/2018* MEDICARE YEARLY EXAM 7/26/2018 *Topic was postponed. The date shown is not the original due date. Allergies as of 12/4/2017  Review Complete On: 12/4/2017 By: Rose Marie Stuart LPN Severity Noted Reaction Type Reactions Novocain [Procaine]  07/21/2016    Swelling Sulfa (Sulfonamide Antibiotics)  07/21/2016    Rash Current Immunizations  Never Reviewed Name Date Influenza High Dose Vaccine PF 9/26/2017, 10/7/2016 Influenza Vaccine 10/10/2015 12:00 AM  
  
 Not reviewed this visit You Were Diagnosed With   
  
 Codes Comments Other acute back pain    -  Primary ICD-10-CM: M54.9 ICD-9-CM: 724.5 Recurrent falls     ICD-10-CM: R29.6 ICD-9-CM: V15.88 Vitals BP Pulse Temp Resp Height(growth percentile) Weight(growth percentile) 155/61 (BP 1 Location: Left arm, BP Patient Position: Sitting) (!) 107 97.8 °F (36.6 °C) (Oral) 14 4' 8\" (1.422 m) 134 lb (60.8 kg) SpO2 BMI OB Status Smoking Status 94% 30.04 kg/m2 Postmenopausal Former Smoker BMI and BSA Data Body Mass Index Body Surface Area  30.04 kg/m 2 1.55 m 2  
  
  
 Preferred Pharmacy Pharmacy Name Phone [address], [address] [phone] Your Updated Medication List  
  
   
This list is accurate as of: 12/4/17  1:56 PM.  Always use your most recent med list.  
  
  
  
  
 albuterol 90 mcg/actuation inhaler Commonly known as:  PROVENTIL HFA, VENTOLIN HFA, PROAIR HFA Take 2 Puffs by inhalation every six (6) hours as needed for Wheezing. amoxicillin-clavulanate 500-125 mg per tablet Commonly known as:  AUGMENTIN  
TAKE 1/2 TABLET BY MOUTH ONCE A DAY AS DIRECTED FOR 90 DAYS. aspirin delayed-release 81 mg tablet Take 81 mg by mouth daily. 2 tabs  
  
 carvedilol 3.125 mg tablet Commonly known as:  May Hush Take 3.125 mg by mouth two (2) times daily (with meals). CENTRUM SILVER PO Take  by mouth.  
  
 clobetasol 0.05 % Spry APPLY TO AFFECTED AREA EVERY NIGHTAT BEDTIME FOR 2 WEEKS THEN AS NEEDED TO SCALP  
  
 DYMISTA 137-50 mcg/spray [place] Generic drug:  azelastine-fluticasone INSTILL 1 SPRAY TO EACH NOSTRIL TWICE A DAY  
  
 fluocinolone acetonide oil 0.01 % Drop FLUZONE HIGH-DOSE 2017-18 (PF) Syrg injection Generic drug:  influenza vaccine 2017-18 (65 yrs+)(PF)  
TO BE ADMINISTERED BY PHARMACIST FOR IMMUNIZATION  
  
 inhalational spacing device Use as directed with inhaler  
  
 ketoconazole 2 % shampoo Commonly known as:  NIZORAL  
  
 nitroglycerin 0.4 mg SL tablet Commonly known as:  NITROSTAT One tab sublingual every 5 min for relief of pain x 3 as necessary. Report to ER if pain lasts longer than 30 min  
  
 pravastatin 40 mg tablet Commonly known as:  PRAVACHOL Take 1 Tab by mouth nightly. PREMARIN 0.625 mg/gram vaginal cream  
Generic drug:  conjugated estrogens APPLY A SMALL AMOUNT TO AFFECTED AREA EVERY NIGHT AS DIRECTED PRESERVISION LUTEIN PO Take  by mouth. PROBIOTIC PO Take  by mouth daily. traMADol 50 mg tablet Commonly known as:  ULTRAM  
Take 1 Tab by mouth every eight (8) hours as needed for Pain. Max Daily Amount: 150 mg. Indications: Pain  
  
 trospium 60 mg capsule Commonly known as:  SANCTURA XL Take 60 mg by mouth Daily (before breakfast). Prescriptions Printed Refills  
 traMADol (ULTRAM) 50 mg tablet 0 Sig: Take 1 Tab by mouth every eight (8) hours as needed for Pain. Max Daily Amount: 150 mg. Indications: Pain Class: Print Route: Oral  
  
Follow-up Instructions Return if symptoms worsen or fail to improve. To-Do List   
 12/04/2017 Imaging:  XR RIBS BI W PA CHEST 4 VS   
  
  
Patient Instructions Tramadol (By mouth) Tramadol (TRAM-a-dol) Treats moderate to severe pain. This medicine is a narcotic pain reliever. Brand Name(s): ConZip, FusePaq Synapryn, Ultram, Ultram ER, traMADol HCl There may be other brand names for this medicine. When This Medicine Should Not Be Used: This medicine is not right for everyone. Do not use if you had an allergic reaction to tramadol or other narcotic medicine, or if you have stomach or bowel blockage (including paralytic ileus). How to Use This Medicine:  
Long Acting Capsule, Liquid, Tablet, Dissolving Tablet, Long Acting Tablet · Take your medicine as directed. Your dose may need to be changed several times to find what works best for you. · Make sure your hands are dry before you handle the disintegrating tablet. Peel back the foil from the blister pack, then remove the tablet. Do not push the tablet through the foil. Place the tablet in your mouth. After it has melted, swallow or take a drink of water. · Swallow the extended-release tablet whole. Do not crush, break, or chew it. · Drink plenty of liquids to help avoid constipation. · This medicine should come with a Medication Guide. Ask your pharmacist for a copy if you do not have one. · Missed dose: Take a dose as soon as you remember. If it is almost time for your next dose, wait until then and take a regular dose. Do not take extra medicine to make up for a missed dose. · Store the medicine in a closed container at room temperature, away from heat, moisture, and direct light. Drugs and Foods to Avoid: Ask your doctor or pharmacist before using any other medicine, including over-the-counter medicines, vitamins, and herbal products. · Do not use this medicine if you are using or have used an MAO inhibitor within the past 14 days. · Some medicines can affect how tramadol works. Tell your doctor if you are using any of the following: ¨ Carbamazepine, cyclobenzaprine, digoxin, erythromycin, ketoconazole, lithium, mirtazapine, phenytoin, promethazine, rifampin, ritonavir, quinidine, or trazodone ¨ Blood thinner (including warfarin) ¨ Diuretic (water pill) ¨ Medicine to treat depression (including bupropion, fluoxetine, paroxetine, quinidine) ¨ Phenothiazine medicine ¨ Triptan medicine for migraine headaches · Tell your doctor if you use anything else that makes you sleepy. Some examples are allergy medicine, narcotic pain medicine, and alcohol. Tell your doctor if you are using a muscle relaxer. · Do not drink alcohol while you are using this medicine. Warnings While Using This Medicine: · Tell your doctor if you are pregnant or breastfeeding, or if you have kidney disease, liver disease (including cirrhosis), gallstones, lung or breathing problems, pancreas problems, or a history of head injury, seizures, drug addiction, or depression or similar emotional problems. Tell your doctor if you have phenylketonuria. · This medicine may cause the following problems: 
¨ High risk of overdose, which can lead to death ¨ Respiratory depression (serious breathing problem that can be life-threatening) ¨ Serotonin syndrome (when used with certain medicines) ¨ Unusual change in mood or behavior · This medicine may make you dizzy, drowsy, or lightheaded. Do not drive or doing anything else that could be dangerous until you know how this medicine affects you. · This medicine can be habit-forming. Do not use more than your prescribed dose. Call your doctor if you think your medicine is not working. · Do not stop using this medicine suddenly. Your doctor will need to slowly decrease your dose before you stop it completely. · Tell any doctor or dentist who treats you that you are using this medicine. · This medicine may cause constipation, especially with long-term use. Ask your doctor if you should use a laxative to prevent and treat constipation. · This medicine could cause infertility. Talk with your doctor before using this medicine if you plan to have children. · Keep all medicine out of the reach of children. Never share your medicine with anyone. Possible Side Effects While Using This Medicine:  
Call your doctor right away if you notice any of these side effects: · Allergic reaction: Itching or hives, swelling in your face or hands, swelling or tingling in your mouth or throat, chest tightness, trouble breathing · Anxiety, restlessness, fast heartbeat, fever, sweating, muscle spasms, nausea, vomiting, diarrhea, seeing or hearing things that are not there · Blistering, peeling, red skin rash · Blue lips, fingernails, or skin · Extreme dizziness, drowsiness, or weakness, shallow breathing, slow heartbeat, seizures, and cold, clammy skin · Lightheadedness, dizziness, fainting · Seizures · Unusual mood or behavior, thoughts of killing yourself or others · Trouble breathing If you notice these less serious side effects, talk with your doctor: · Constipation, loss of appetite, stomach upset · Dry mouth 
· Headache If you notice other side effects that you think are caused by this medicine, tell your doctor. Call your doctor for medical advice about side effects. You may report side effects to FDA at 2-589-RBC-7196 © 2017 [facility] Information is for End User's use only and may not be sold, redistributed or otherwise used for commercial purposes. The above information is an  only. It is not intended as medical advice for individual conditions or treatments. Talk to your doctor, nurse or pharmacist before following any medical regimen to see if it is safe and effective for you. Introducing [facility] SERVICES! Dear Anita Ball: Thank you for requesting a YES[employer]TAP account. Our records indicate that you already have an active YES.[email or web address] account. You can access your account anytime at [email or web address] Did you know that you can access your hospital and ER discharge instructions at any time in [email or web address]? You can also review all of your test results from your hospital stay or ER visit. Additional Information If you have questions, please visit the Frequently Asked Questions section of the [email or web address] website at [email or web address] Remember, [email or web address] is NOT to be used for urgent needs. For medical emergencies, dial 911. Now available from your iPhone and Android! Please provide this summary of care documentation to your next provider. Your primary care clinician is listed as Raul De. If you have any questions after today's visit, please call [phone].

## 2017-12-05 ENCOUNTER — TELEPHONE (OUTPATIENT)
Dept: INTERNAL MEDICINE CLINIC | Age: 82
End: 2017-12-05

## 2017-12-05 NOTE — TELEPHONE ENCOUNTER
----- Message from Jet Swenson MD sent at 12/5/2017  8:08 AM EST -----  Please let her know there was no rib fractures seen.

## 2017-12-06 ENCOUNTER — TELEPHONE (OUTPATIENT)
Dept: INTERNAL MEDICINE CLINIC | Age: 82
End: 2017-12-06

## 2017-12-06 RX ORDER — CYCLOBENZAPRINE HCL 5 MG
5 TABLET ORAL
Qty: 30 TAB | Refills: 2 | Status: SHIPPED | OUTPATIENT
Start: 2017-12-06 | End: 2018-12-26

## 2017-12-06 NOTE — TELEPHONE ENCOUNTER
Spoke with pt's daughter. Has been using TENS unit which seems to be helping more with pain. ? Itching related to tramadol though this does help. Will try holding tramadol and see if this resolves. Will try low dose flexeril and see if this is tolerated.

## 2017-12-06 NOTE — TELEPHONE ENCOUNTER
Patient's daughter Negra Clark is calling in to let you know the patient is not getting any better. States the muscle spasm are getting worse and the Tramadol is only taking the edge off the pain. Also states the patient is complaining the medication makes her hands itch, but there is not visible rash or hives.   Mrs. Carol Melendez can be reached at [phone]

## 2017-12-19 ENCOUNTER — TELEPHONE (OUTPATIENT)
Dept: INTERNAL MEDICINE CLINIC | Age: 82
End: 2017-12-19

## 2017-12-19 RX ORDER — MELOXICAM 7.5 MG/1
7.5 TABLET ORAL DAILY
Qty: 90 TAB | Refills: 0 | Status: SHIPPED | OUTPATIENT
Start: 2017-12-19 | End: 2018-03-14 | Stop reason: SDUPTHER

## 2017-12-19 NOTE — TELEPHONE ENCOUNTER
Patient's daughter Lennox Mimes called to inform the doctor her mother is still having back pain since falling. She did say her mother is currently in PT as well. She would like to know if her mother could try to take Mobic and see if she gets any relief. Request return phone call.

## 2017-12-19 NOTE — TELEPHONE ENCOUNTER
Her renal function looked fine on her last labs so it is okay to try this. Does she need a prescription sent?

## 2017-12-20 NOTE — TELEPHONE ENCOUNTER
Called spoke with daughter Viri Ours verified with two identifiers advised that Dr. Stephany Mills did put in for the Mobic she stated she got it and is very thankful to Dr. Stephany Mills.

## 2018-01-15 ENCOUNTER — OFFICE VISIT (OUTPATIENT)
Dept: INTERNAL MEDICINE CLINIC | Age: 83
End: 2018-01-15

## 2018-01-15 ENCOUNTER — TELEPHONE (OUTPATIENT)
Dept: INTERNAL MEDICINE CLINIC | Age: 83
End: 2018-01-15

## 2018-01-15 ENCOUNTER — HOSPITAL ENCOUNTER (OUTPATIENT)
Dept: LAB | Age: 83
Discharge: HOME | End: 2018-01-15
Payer: MEDICARE

## 2018-01-15 VITALS
HEIGHT: 56 IN | TEMPERATURE: 97 F | SYSTOLIC BLOOD PRESSURE: 138 MMHG | RESPIRATION RATE: 18 BRPM | BODY MASS INDEX: 29.69 KG/M2 | HEART RATE: 91 BPM | DIASTOLIC BLOOD PRESSURE: 52 MMHG | WEIGHT: 132 LBS | OXYGEN SATURATION: 94 %

## 2018-01-15 DIAGNOSIS — J06.9 UPPER RESPIRATORY TRACT INFECTION, UNSPECIFIED TYPE: Primary | ICD-10-CM

## 2018-01-15 DIAGNOSIS — J06.9 UPPER RESPIRATORY TRACT INFECTION, UNSPECIFIED TYPE: ICD-10-CM

## 2018-01-15 LAB
ALBUMIN SERPL-MCNC: 3.5 G/DL (ref 3.4–5)
ALBUMIN/GLOB SERPL: 1.1 {RATIO} (ref 0.8–1.7)
ALP SERPL-CCNC: 68 U/L (ref 45–117)
ALT SERPL-CCNC: 24 U/L (ref 13–56)
ANION GAP SERPL CALC-SCNC: 8 MMOL/L (ref 3–18)
AST SERPL-CCNC: 24 U/L (ref 15–37)
BASOPHILS # BLD: 0 K/UL (ref 0–0.06)
BASOPHILS NFR BLD: 0 % (ref 0–2)
BILIRUB SERPL-MCNC: 0.6 MG/DL (ref 0.2–1)
BNP SERPL-MCNC: 1956 PG/ML (ref 0–1800)
BUN SERPL-MCNC: 20 MG/DL (ref 7–18)
BUN/CREAT SERPL: 18 (ref 12–20)
CALCIUM SERPL-MCNC: 9.2 MG/DL (ref 8.5–10.1)
CHLORIDE SERPL-SCNC: 100 MMOL/L (ref 100–108)
CO2 SERPL-SCNC: 28 MMOL/L (ref 21–32)
CREAT SERPL-MCNC: 1.11 MG/DL (ref 0.6–1.3)
DIFFERENTIAL METHOD BLD: ABNORMAL
EOSINOPHIL # BLD: 0.3 K/UL (ref 0–0.4)
EOSINOPHIL NFR BLD: 5 % (ref 0–5)
ERYTHROCYTE [DISTWIDTH] IN BLOOD BY AUTOMATED COUNT: 13.4 % (ref 11.6–14.5)
GLOBULIN SER CALC-MCNC: 3.2 G/DL (ref 2–4)
GLUCOSE SERPL-MCNC: 163 MG/DL (ref 74–99)
HCT VFR BLD AUTO: 43.4 % (ref 35–45)
HGB BLD-MCNC: 14.3 G/DL (ref 12–16)
LYMPHOCYTES # BLD: 0.8 K/UL (ref 0.9–3.6)
LYMPHOCYTES NFR BLD: 15 % (ref 21–52)
MCH RBC QN AUTO: 30.6 PG (ref 24–34)
MCHC RBC AUTO-ENTMCNC: 32.9 G/DL (ref 31–37)
MCV RBC AUTO: 92.9 FL (ref 74–97)
MONOCYTES # BLD: 0.7 K/UL (ref 0.05–1.2)
MONOCYTES NFR BLD: 13 % (ref 3–10)
NEUTS SEG # BLD: 3.7 K/UL (ref 1.8–8)
NEUTS SEG NFR BLD: 67 % (ref 40–73)
PLATELET # BLD AUTO: 169 K/UL (ref 135–420)
PMV BLD AUTO: 12.6 FL (ref 9.2–11.8)
POTASSIUM SERPL-SCNC: 4.8 MMOL/L (ref 3.5–5.5)
PROT SERPL-MCNC: 6.7 G/DL (ref 6.4–8.2)
RBC # BLD AUTO: 4.67 M/UL (ref 4.2–5.3)
SODIUM SERPL-SCNC: 136 MMOL/L (ref 136–145)
WBC # BLD AUTO: 5.5 K/UL (ref 4.6–13.2)

## 2018-01-15 PROCEDURE — 85025 COMPLETE CBC W/AUTO DIFF WBC: CPT | Performed by: INTERNAL MEDICINE

## 2018-01-15 PROCEDURE — 36415 COLL VENOUS BLD VENIPUNCTURE: CPT | Performed by: INTERNAL MEDICINE

## 2018-01-15 PROCEDURE — 80053 COMPREHEN METABOLIC PANEL: CPT | Performed by: INTERNAL MEDICINE

## 2018-01-15 PROCEDURE — 83880 ASSAY OF NATRIURETIC PEPTIDE: CPT | Performed by: INTERNAL MEDICINE

## 2018-01-15 RX ORDER — LEVOFLOXACIN 750 MG/1
750 TABLET ORAL DAILY
Qty: 5 TAB | Refills: 0 | Status: SHIPPED | OUTPATIENT
Start: 2018-01-15 | End: 2018-01-20

## 2018-01-15 NOTE — PATIENT INSTRUCTIONS
Levofloxacin (By mouth)   Levofloxacin ([id number]-[id number]-a-[id number])  Treats infections. This medicine is a quinolone antibiotic. Brand Name(s): Levaquin   There may be other brand names for this medicine. When This Medicine Should Not Be Used: This medicine is not right for everyone. Do not use it if you had an allergic reaction to levofloxacin or to similar medicines. How to Use This Medicine:   Liquid, Tablet  · Your doctor will tell you how much medicine to use. Do not use more than directed. Take your medicine at the same time each day. · Tablet: Take it with or without food. · Liquid: Take it 1 hour before or 2 hours after you eat. Measure the oral liquid medicine with a marked measuring spoon, oral syringe, or medicine cup. · Take all of the medicine in your prescription to clear up your infection, even if you feel better after the first few doses. · Drink extra fluids so you will urinate more often and help prevent kidney problems. · This medicine should come with a Medication Guide. Ask your pharmacist for a copy if you do not have one. · Missed dose: Take a dose as soon as you remember. If it is almost time for your next dose, wait until then and take a regular dose. Do not take extra medicine to make up for a missed dose. · Store the medicine in a closed container at room temperature, away from heat, moisture, and direct light. Drugs and Foods to Avoid:   Ask your doctor or pharmacist before using any other medicine, including over-the-counter medicines, vitamins, and herbal products. · Some foods and medicines can affect how levofloxacin works.  Tell your doctor if you are using any of the following:  ¨ Theophylline  ¨ Blood thinner (including warfarin)  ¨ Diabetes medicine  ¨ Medicine for heart rhythm problems (including amiodarone, procainamide, quinidine, sotalol)  ¨ NSAID pain or arthritis medicine (including aspirin, celecoxib, diclofenac, ibuprofen, naproxen)  ¨ Steroid medicine (including hydrocortisone, methylprednisolone, prednisone)  · Take levofloxacin at least 2 hours before or 2 hours after you take antacids that contain magnesium or aluminum, zinc, or iron supplements, sucralfate, or didanosine. Warnings While Using This Medicine:   · Tell your doctor if you are pregnant or breastfeeding, or if you have kidney disease, liver disease, diabetes, heart disease, myasthenia gravis, or a history of heart rhythm problems (such as QT prolongation) or seizures. Tell your doctor if you have ever had tendon or joint problems, including rheumatoid arthritis, or if you have received a transplant. · This medicine may cause the following problems:  ¨ Tendinitis and tendon rupture (may happen after treatment ends)  ¨ Liver damage  ¨ Nerve damage in the arms or legs  ¨ Heart rhythm changes  ¨ Changes in blood sugar levels  · This medicine may make you feel dizzy or lightheaded. Do not drive or do anything else that could be dangerous until you know how this medicine affects you. · This medicine can cause diarrhea. Call your doctor if the diarrhea becomes severe, does not stop, or is bloody. Do not take any medicine to stop diarrhea until you have talked to your doctor. Diarrhea can occur 2 months or more after you stop taking this medicine. · Tell any doctor or dentist who treats you that you are using this medicine. This medicine may affect certain medical test results. · This medicine may make your skin more sensitive to sunlight. Wear sunscreen. Do not use sunlamps or tanning beds. · Call your doctor if your symptoms do not improve or if they get worse. · Keep all medicine out of the reach of children. Never share your medicine with anyone.   Possible Side Effects While Using This Medicine:   Call your doctor right away if you notice any of these side effects:  · Allergic reaction: Itching or hives, swelling in your face or hands, swelling or tingling in your mouth or throat, chest tightness, trouble breathing  · Blistering, peeling, red skin rash  · Change in how much or how often you urinate  · Dark urine or pale stools, nausea, vomiting, loss of appetite, stomach pain, yellow skin or eyes  · Diarrhea that may contain blood  · Fainting, dizziness, or lightheadedness  · Fast, slow, or uneven heartbeat, chest pain  · Numbness, tingling, or burning pain in your hands, arms, legs, or feet  · Pain, stiffness, swelling, or bruises around your ankle, leg, shoulder, or other joint  · Seizures, severe headache, unusual thoughts or behaviors, trouble sleeping, feeling anxious, confused, or depressed, seeing, hearing, or feeling things that are not there  · Unusual bleeding, bruising, or weakness  If you notice these less serious side effects, talk with your doctor:   · Mild headache or nausea  If you notice other side effects that you think are caused by this medicine, tell your doctor. Call your doctor for medical advice about side effects. You may report side effects to FDA at 1-680-FDA-0309  © 2017 [employer] Information is for End User's use only and may not be sold, redistributed or otherwise used for commercial purposes. The above information is an  only. It is not intended as medical advice for individual conditions or treatments. Talk to your doctor, nurse or pharmacist before following any medical regimen to see if it is safe and effective for you.

## 2018-01-15 NOTE — PROGRESS NOTES
HISTORY OF PRESENT ILLNESS  Bakari Mata is a 80 y.o. female. HPI Comments: 81 yo female here for evaluation of cough x 5 days. Was noted to have fever, cough, and weakness by friend then taken to ED by family. CXR with interstitial findings but no consolidation. She did have elevation of WBC, mild elevation of troponin. Was treated with azithromycin. Has been staying with family who have noted continued fatigue, cough. She seems weaker with ambulating but no falls. Sleeping a lot during the day. Review of Systems   Constitutional: Positive for malaise/fatigue. Negative for chills, fever and weight loss. HENT: Negative for congestion and ear pain. Eyes: Negative for blurred vision and pain. Respiratory: Positive for cough and wheezing. Cardiovascular: Negative for chest pain, palpitations and leg swelling. Gastrointestinal: Negative for abdominal pain. Genitourinary: Negative for frequency and urgency. Musculoskeletal: Negative for joint pain and myalgias. Skin: Negative for itching and rash. Neurological: Negative for dizziness, tingling and headaches. Psychiatric/Behavioral: Negative for depression. The patient is not nervous/anxious. Past Medical History:   Diagnosis Date    Arthritis     CAD (coronary artery disease)     Cancer ([facility] [address]     breast and colon    GERD (gastroesophageal reflux disease)     High cholesterol     Hypertension     Urinary incontinence      Current Outpatient Prescriptions on File Prior to Visit   Medication Sig Dispense Refill    meloxicam (MOBIC) 7.5 mg tablet Take 1 Tab by mouth daily. 90 Tab 0    clobetasol 0.05 % spry APPLY TO AFFECTED AREA EVERY NIGHTAT BEDTIME FOR 2 WEEKS THEN AS NEEDED TO SCALP  2    nitroglycerin (NITROSTAT) 0.4 mg SL tablet One tab sublingual every 5 min for relief of pain x 3 as necessary. Report to ER if pain lasts longer than 30 min      pravastatin (PRAVACHOL) 40 mg tablet Take 1 Tab by mouth nightly.  90 Tab 3  fluocinolone acetonide oil 0.01 % drop       ketoconazole (NIZORAL) 2 % shampoo       VIT C/VIT E AC/LUT/COPPER/ZINC (PRESERVISION LUTEIN PO) Take  by mouth.  albuterol (PROVENTIL HFA, VENTOLIN HFA, PROAIR HFA) 90 mcg/actuation inhaler Take 2 Puffs by inhalation every six (6) hours as needed for Wheezing. 1 Inhaler 3    PREMARIN 0.625 mg/gram vaginal cream APPLY A SMALL AMOUNT TO AFFECTED AREA EVERY NIGHT AS DIRECTED  0    amoxicillin-clavulanate (AUGMENTIN) 500-125 mg per tablet TAKE 1/2 TABLET BY MOUTH ONCE A DAY AS DIRECTED FOR 90 DAYS.  0    DYMISTA 137-50 mcg/spray spry INSTILL 1 SPRAY TO EACH NOSTRIL TWICE A DAY  12    FOLIC ACID/MULTIVIT-MIN/LUTEIN (CENTRUM SILVER PO) Take  by mouth.  trospium (SANCTURA XL) 60 mg capsule Take 60 mg by mouth Daily (before breakfast).  carvedilol (COREG) 3.125 mg tablet Take 3.125 mg by mouth daily. 0    aspirin delayed-release 81 mg tablet Take 81 mg by mouth daily. 2 tabs      LACTOBACILLUS ACIDOPHILUS (PROBIOTIC PO) Take  by mouth daily.  cyclobenzaprine (FLEXERIL) 5 mg tablet Take 1 Tab by mouth three (3) times daily as needed for Muscle Spasm(s). 30 Tab 2    traMADol (ULTRAM) 50 mg tablet Take 1 Tab by mouth every eight (8) hours as needed for Pain. Max Daily Amount: 150 mg. Indications: Pain 30 Tab 0    FLUZONE HIGH-DOSE 2017-18, PF, syrg injection TO BE ADMINISTERED BY PHARMACIST FOR IMMUNIZATION  0    inhalational spacing device Use as directed with inhaler 1 Device 0     No current facility-administered medications on file prior to visit. Social History   Substance Use Topics    Smoking status: Former Smoker     Types: Cigarettes     Quit date: 7/21/1983    Smokeless tobacco: Never Used    Alcohol use Yes      Comment: rare     Physical Exam   Constitutional: She appears well-developed and well-nourished. No distress.    /52 (BP 1 Location: Right arm, BP Patient Position: Sitting)  Pulse 91  Temp 97 °F (36.1 °C) (Oral) Resp 18  Ht 4' 8\" (1.422 m)  Wt 132 lb (59.9 kg)  SpO2 94%  BMI 29.59 kg/m2     Eyes: EOM are normal. Right eye exhibits no discharge. Left eye exhibits no discharge. No scleral icterus. Neck: Neck supple. Cardiovascular: Normal rate, regular rhythm and normal heart sounds. Exam reveals no gallop and no friction rub. No murmur heard. Pulmonary/Chest: Effort normal. No respiratory distress. She has no wheezes. She has rales. Musculoskeletal: She exhibits no edema or tenderness. Lymphadenopathy:     She has no cervical adenopathy. Neurological: She is alert. She exhibits normal muscle tone. Skin: Skin is warm and dry. Psychiatric: She has a normal mood and affect. Lab Results   Component Value Date/Time    WBC 6.5 02/22/2017 02:24 PM    HGB 13.2 02/22/2017 02:24 PM    HCT 41.1 02/22/2017 02:24 PM    PLATELET 553 60/01/1940 02:24 PM    MCV 93.4 02/22/2017 02:24 PM     Lab Results   Component Value Date/Time    Sodium 142 10/31/2017 11:04 AM    Potassium 4.8 10/31/2017 11:04 AM    Chloride 105 10/31/2017 11:04 AM    CO2 29 10/31/2017 11:04 AM    Anion gap 8 10/31/2017 11:04 AM    Glucose 132 10/31/2017 11:04 AM    BUN 17 10/31/2017 11:04 AM    Creatinine 0.78 10/31/2017 11:04 AM    BUN/Creatinine ratio 22 10/31/2017 11:04 AM    GFR est AA >60 10/31/2017 11:04 AM    GFR est non-AA >60 10/31/2017 11:04 AM    Calcium 9.5 10/31/2017 11:04 AM    Bilirubin, total 0.6 10/31/2017 11:04 AM    AST (SGOT) 17 10/31/2017 11:04 AM    Alk. phosphatase 68 10/31/2017 11:04 AM    Protein, total 6.8 10/31/2017 11:04 AM    Albumin 3.7 10/31/2017 11:04 AM    Globulin 3.1 10/31/2017 11:04 AM    A-G Ratio 1.2 10/31/2017 11:04 AM    ALT (SGPT) 20 10/31/2017 11:04 AM     ASSESSMENT and PLAN    ICD-10-CM ICD-9-CM    1.  Upper respiratory tract infection, unspecified type V27.1 913.4 METABOLIC PANEL, COMPREHENSIVE      CBC WITH AUTOMATED DIFF      NT-PRO BNP      XR CHEST PA LAT     No sig improvement with azithromycin. Did have elevated WBC, increased PMNs on labs at ED. Will repeat CXR and treat with course of levofloxacin.

## 2018-01-15 NOTE — TELEPHONE ENCOUNTER
----- Message from Mely Mccurdy MD sent at 1/15/2018  2:35 PM EST -----  Please let her know her CXR looks okay today. There is no evidence of pneumonia. If she wants to hold on starting the new antibiotic, I am okay with her watching her symptoms and we can discuss starting it after I have her lab results available.

## 2018-01-15 NOTE — PROGRESS NOTES
ROOM # [id number]    Aron Dacosta presents today for   Chief Complaint   Patient presents with    ED Follow-up    Shortness of Breath       Aron Dacosta preferred language for health care discussion is english/other. Is someone accompanying this pt? Yes, daughter and son in law    Is the patient using any DME equipment during [address]? Yes, walker    Depression Screening:  PHQ over the last two weeks 10/23/2017 7/25/2017 11/1/2016   Little interest or pleasure in doing things Several days Not at all More than half the days   Feeling down, depressed or hopeless Several days Not at all Not at all   Total Score PHQ 2 2 0 2       Learning Assessment:  No flowsheet data found. Abuse Screening:  Abuse Screening Questionnaire 10/23/2017   Do you ever feel afraid of your partner? N   Are you in a relationship with someone who physically or mentally threatens you? N   Is it safe for you to go home? Y       Fall Risk  Fall Risk Assessment, last 12 mths 1/15/2018 12/4/2017 10/23/2017 7/25/2017 2/22/2017 11/1/2016 7/21/2016   Able to walk? Yes Yes Yes Yes Yes Yes Yes   Fall in past 12 months? No Yes Yes No Yes Yes No   Fall with injury? - - Yes - Yes No -   Number of falls in past 12 months - 3 1 - 1 1 -   Fall Risk Score - - 2 - 2 1 -       Health Maintenance reviewed and discussed per provider. Yes    Aron Dacosta is due for tdap, zoster, gluacoma screening dexa scan pneu. Please order/place referral if appropriate. Pt currently taking Antiplatelet therapy? Yes, aspirin 81    Advance Directive:  1. Do you have an advance directive in place? Patient Reply: yes     2. If not, would you like material regarding how to put one in place? Patient Reply: In chart somewhere have to look    Coordination of Care:  1. Have you been to the ER, urgent care clinic since your last visit? Hospitalized since your last visit? Yes, [facility] 1/10/18    2.  Have you seen or consulted any other health care providers outside of the [address] since your last visit? Include any pap smears or colon screening. no    Please see Red banners under Allergies, Med rec, Immunizations to remove outside inquires. All correct information has been verified with patient and added to chart.

## 2018-01-15 NOTE — TELEPHONE ENCOUNTER
Informed patients daughter of note below, patient daughter verbalized  understanding and agrees with plan, patient daughter states she gave patient her first dose of antibiotic today.

## 2018-01-15 NOTE — MR AVS SNAPSHOT
Visit Information Date & Time Provider Department Dept. Phone Encounter #  
 1/15/2018 11:00 AM [address] [phone] [id number] Follow-up Instructions Return if symptoms worsen or fail to improve. Your Appointments 1/25/2018 11:30 AM  
Office Visit with MD RUDOLPH Monaco [facility]) Appt Note: 6 month f/u HTN  
 [address]  
  
   
 [address] [facility] Upcoming Health Maintenance Date Due DTaP/Tdap/Td series (1 - Tdap) 4/8/1947 ZOSTER VACCINE AGE 60> 2/8/1986 GLAUCOMA SCREENING Q2Y 4/8/1991 OSTEOPOROSIS SCREENING (DEXA) 4/8/1991 Pneumococcal 65+ Low/Medium Risk (1 of 2 - PCV13) 4/8/1991 MEDICARE YEARLY EXAM 7/26/2018 Allergies as of 1/15/2018  Review Complete On: 1/15/2018 By: Brook Esposito MD  
  
 Severity Noted Reaction Type Reactions Novocain [Procaine]  07/21/2016    Swelling Sulfa (Sulfonamide Antibiotics)  07/21/2016    Rash Current Immunizations  Never Reviewed Name Date Influenza High Dose Vaccine PF 9/26/2017, 10/7/2016 Influenza Vaccine 10/10/2015 12:00 AM  
  
 Not reviewed this visit You Were Diagnosed With   
  
 Codes Comments Upper respiratory tract infection, unspecified type    -  Primary ICD-10-CM: J06.9 ICD-9-CM: 465.9 Vitals BP Pulse Temp Resp Height(growth percentile) Weight(growth percentile) 138/52 (BP 1 Location: Right arm, BP Patient Position: Sitting) 91 97 °F (36.1 °C) (Oral) 18 4' 8\" (1.422 m) 132 lb (59.9 kg) SpO2 BMI OB Status Smoking Status 94% 29.59 kg/m2 Postmenopausal Former Smoker Vitals History BMI and BSA Data Body Mass Index Body Surface Area  
 29.59 kg/m 2 1.54 m 2 Preferred Pharmacy Pharmacy Name Phone [address] [facility], [address] [phone] Your Updated Medication List  
  
   
This list is accurate as of: 1/15/18 11:37 AM.  Always use your most recent med list.  
  
  
  
  
 albuterol 90 mcg/actuation inhaler Commonly known as:  PROVENTIL HFA, VENTOLIN HFA, PROAIR HFA Take 2 Puffs by inhalation every six (6) hours as needed for Wheezing. amoxicillin-clavulanate 500-125 mg per tablet Commonly known as:  AUGMENTIN  
TAKE 1/2 TABLET BY MOUTH ONCE A DAY AS DIRECTED FOR 90 DAYS. aspirin delayed-release 81 mg tablet Take 81 mg by mouth daily. 2 tabs  
  
 carvedilol 3.125 mg tablet Commonly known as:  Daryle Rubins Take 3.125 mg by mouth daily. CENTRUM SILVER PO Take  by mouth.  
  
 clobetasol 0.05 % Spry APPLY TO AFFECTED AREA EVERY NIGHTAT BEDTIME FOR 2 WEEKS THEN AS NEEDED TO SCALP  
  
 CRANBERRY PO Take 300 mg by mouth. cyclobenzaprine 5 mg tablet Commonly known as:  FLEXERIL Take 1 Tab by mouth three (3) times daily as needed for Muscle Spasm(s). DYMISTA 137-50 mcg/spray [place] Generic drug:  azelastine-fluticasone INSTILL 1 SPRAY TO EACH NOSTRIL TWICE A DAY  
  
 fluocinolone acetonide oil 0.01 % Drop FLUZONE HIGH-DOSE 2017-18 (PF) Syrg injection Generic drug:  influenza vaccine 2017-18 (65 yrs+)(PF)  
TO BE ADMINISTERED BY PHARMACIST FOR IMMUNIZATION  
  
 inhalational spacing device Use as directed with inhaler  
  
 ketoconazole 2 % shampoo Commonly known as:  NIZORAL  
  
 levoFLOXacin 750 mg tablet Commonly known as:  Marda Toure Take 1 Tab by mouth daily for 5 days. meloxicam 7.5 mg tablet Commonly known as:  MOBIC Take 1 Tab by mouth daily. nitroglycerin 0.4 mg SL tablet Commonly known as:  NITROSTAT One tab sublingual every 5 min for relief of pain x 3 as necessary. Report to ER if pain lasts longer than 30 min  
  
 pravastatin 40 mg tablet Commonly known as:  PRAVACHOL  
 Take 1 Tab by mouth nightly. PREMARIN 0.625 mg/gram vaginal cream  
Generic drug:  conjugated estrogens APPLY A SMALL AMOUNT TO AFFECTED AREA EVERY NIGHT AS DIRECTED PRESERVISION LUTEIN PO Take  by mouth. PROBIOTIC PO Take  by mouth daily. traMADol 50 mg tablet Commonly known as:  ULTRAM  
Take 1 Tab by mouth every eight (8) hours as needed for Pain. Max Daily Amount: 150 mg. Indications: Pain  
  
 trospium 60 mg capsule Commonly known as:  SANCTURA XL Take 60 mg by mouth Daily (before breakfast). Prescriptions Sent to Pharmacy Refills  
 levoFLOXacin (LEVAQUIN) 750 mg tablet 0 Sig: Take 1 Tab by mouth daily for 5 days. Class: Normal  
 Pharmacy: [id number] [place] [address] Place, [address] [facility] #: [phone] Route: Oral  
  
Follow-up Instructions Return if symptoms worsen or fail to improve. To-Do List   
 01/15/2018 Lab:  CBC WITH AUTOMATED DIFF   
  
 01/15/2018 Lab:  METABOLIC PANEL, COMPREHENSIVE   
  
 01/15/2018 Lab:  NT-PRO BNP   
  
 01/15/2018 Imaging:  XR CHEST PA LAT Patient Instructions Levofloxacin (By mouth) Levofloxacin ([id number]-gyt-RQEN-a-sin) Treats infections. This medicine is a quinolone antibiotic. Brand Name(s): Levaquin There may be other brand names for this medicine. When This Medicine Should Not Be Used: This medicine is not right for everyone. Do not use it if you had an allergic reaction to levofloxacin or to similar medicines. How to Use This Medicine:  
Liquid, Tablet · Your doctor will tell you how much medicine to use. Do not use more than directed. Take your medicine at the same time each day. · Tablet: Take it with or without food. · Liquid: Take it 1 hour before or 2 hours after you eat. Measure the oral liquid medicine with a marked measuring spoon, oral syringe, or medicine cup.  
· Take all of the medicine in your prescription to clear up your infection, even if you feel better after the first few doses. · Drink extra fluids so you will urinate more often and help prevent kidney problems. · This medicine should come with a Medication Guide. Ask your pharmacist for a copy if you do not have one. · Missed dose: Take a dose as soon as you remember. If it is almost time for your next dose, wait until then and take a regular dose. Do not take extra medicine to make up for a missed dose. · Store the medicine in a closed container at room temperature, away from heat, moisture, and direct light. Drugs and Foods to Avoid: Ask your doctor or pharmacist before using any other medicine, including over-the-counter medicines, vitamins, and herbal products. · Some foods and medicines can affect how levofloxacin works. Tell your doctor if you are using any of the following: ¨ Theophylline ¨ Blood thinner (including warfarin) ¨ Diabetes medicine ¨ Medicine for heart rhythm problems (including amiodarone, procainamide, quinidine, sotalol) ¨ NSAID pain or arthritis medicine (including aspirin, celecoxib, diclofenac, ibuprofen, naproxen) ¨ Steroid medicine (including hydrocortisone, methylprednisolone, prednisone) · Take levofloxacin at least 2 hours before or 2 hours after you take antacids that contain magnesium or aluminum, zinc, or iron supplements, sucralfate, or didanosine. Warnings While Using This Medicine: · Tell your doctor if you are pregnant or breastfeeding, or if you have kidney disease, liver disease, diabetes, heart disease, myasthenia gravis, or a history of heart rhythm problems (such as QT prolongation) or seizures. Tell your doctor if you have ever had tendon or joint problems, including rheumatoid arthritis, or if you have received a transplant. · This medicine may cause the following problems: 
¨ Tendinitis and tendon rupture (may happen after treatment ends) ¨ Liver damage ¨ Nerve damage in the arms or legs ¨ Heart rhythm changes ¨ Changes in blood sugar levels · This medicine may make you feel dizzy or lightheaded. Do not drive or do anything else that could be dangerous until you know how this medicine affects you. · This medicine can cause diarrhea. Call your doctor if the diarrhea becomes severe, does not stop, or is bloody. Do not take any medicine to stop diarrhea until you have talked to your doctor. Diarrhea can occur 2 months or more after you stop taking this medicine. · Tell any doctor or dentist who treats you that you are using this medicine. This medicine may affect certain medical test results. · This medicine may make your skin more sensitive to sunlight. Wear sunscreen. Do not use sunlamps or tanning beds. · Call your doctor if your symptoms do not improve or if they get worse. · Keep all medicine out of the reach of children. Never share your medicine with anyone. Possible Side Effects While Using This Medicine:  
Call your doctor right away if you notice any of these side effects: · Allergic reaction: Itching or hives, swelling in your face or hands, swelling or tingling in your mouth or throat, chest tightness, trouble breathing · Blistering, peeling, red skin rash · Change in how much or how often you urinate · Dark urine or pale stools, nausea, vomiting, loss of appetite, stomach pain, yellow skin or eyes · Diarrhea that may contain blood · Fainting, dizziness, or lightheadedness · Fast, slow, or uneven heartbeat, chest pain · Numbness, tingling, or burning pain in your hands, arms, legs, or feet · Pain, stiffness, swelling, or bruises around your ankle, leg, shoulder, or other joint · Seizures, severe headache, unusual thoughts or behaviors, trouble sleeping, feeling anxious, confused, or depressed, seeing, hearing, or feeling things that are not there · Unusual bleeding, bruising, or weakness If you notice these less serious side effects, talk with your doctor: · Mild headache or nausea If you notice other side effects that you think are caused by this medicine, tell your doctor. Call your doctor for medical advice about side effects. You may report side effects to FDA at 2-757-XPG-2664 © 2017 Quinton Chavarria Information is for End User's use only and may not be sold, redistributed or otherwise used for commercial purposes. The above information is an  only. It is not intended as medical advice for individual conditions or treatments. Talk to your doctor, nurse or pharmacist before following any medical regimen to see if it is safe and effective for you. Introducing [facility] & HEALTH SERVICES! Dear Jorden Perez: Thank you for requesting a [employer] account. Our records indicate that you already have an active Posto7 account. You can access your account anytime at [email or web address]. [email or web address] Did you know that you can access your hospital and ER discharge instructions at any time in Posto7? You can also review all of your test results from your hospital stay or ER visit. Additional Information If you have questions, please visit the Frequently Asked Questions section of the Posto[employer] website at [email or web address] Remember, Posto7 is NOT to be used for urgent needs. For medical emergencies, dial 911. Now available from your iPhone and Android! Please provide this summary of care documentation to your next provider. Your primary care clinician is listed as Raul De. If you have any questions after today's visit, please call [phone].

## 2018-01-16 ENCOUNTER — TELEPHONE (OUTPATIENT)
Dept: INTERNAL MEDICINE CLINIC | Age: 83
End: 2018-01-16

## 2018-01-16 NOTE — TELEPHONE ENCOUNTER
----- Message from Adele Ruffin MD sent at 1/16/2018  7:47 AM EST -----  Please let her know her WBC has improved to normal. Her BNP is mildly elevated which can be a marker for congestive heart failure. Since her CXR looked okay, I think we can monitor this for now. She can hold on the antibiotic.

## 2018-01-16 NOTE — PROGRESS NOTES
Please let her know her WBC has improved to normal. Her BNP is mildly elevated which can be a marker for congestive heart failure. Since her CXR looked okay, I think we can monitor this for now. She can hold on the antibiotic.

## 2018-01-17 NOTE — TELEPHONE ENCOUNTER
Contacted pts daughter at [facility]. Two patient Identifiers confirmed. Advised pt per Dr Santamaria Dears notes. Pts daughter  verbalized understanding and advised they did not schedule a f/u appt at visit on  01/15/2018. Advised I would schedule an appt for pt to be reevaluated. Scheduled pt for 3/8/2018 for f/u. No other issue noted.

## 2018-03-14 RX ORDER — MELOXICAM 7.5 MG/1
TABLET ORAL
Qty: 90 TAB | Refills: 1 | Status: SHIPPED | OUTPATIENT
Start: 2018-03-14 | End: 2018-06-13 | Stop reason: SDUPTHER

## 2018-03-16 ENCOUNTER — HOSPITAL ENCOUNTER (OUTPATIENT)
Dept: LAB | Age: 83
Discharge: HOME | End: 2018-03-16
Payer: MEDICARE

## 2018-03-16 ENCOUNTER — OFFICE VISIT (OUTPATIENT)
Dept: INTERNAL MEDICINE CLINIC | Age: 83
End: 2018-03-16

## 2018-03-16 VITALS
TEMPERATURE: 98.4 F | BODY MASS INDEX: 29.69 KG/M2 | WEIGHT: 132 LBS | OXYGEN SATURATION: 96 % | SYSTOLIC BLOOD PRESSURE: 145 MMHG | DIASTOLIC BLOOD PRESSURE: 57 MMHG | RESPIRATION RATE: 15 BRPM | HEART RATE: 77 BPM | HEIGHT: 56 IN

## 2018-03-16 DIAGNOSIS — I10 ESSENTIAL HYPERTENSION: Primary | ICD-10-CM

## 2018-03-16 DIAGNOSIS — R41.89 SUBJECTIVE MEMORY COMPLAINTS: ICD-10-CM

## 2018-03-16 DIAGNOSIS — H91.93 BILATERAL HEARING LOSS, UNSPECIFIED HEARING LOSS TYPE: ICD-10-CM

## 2018-03-16 DIAGNOSIS — Z23 ENCOUNTER FOR IMMUNIZATION: ICD-10-CM

## 2018-03-16 LAB
ANION GAP SERPL CALC-SCNC: 5 MMOL/L (ref 3–18)
BNP SERPL-MCNC: 551 PG/ML (ref 0–1800)
BUN SERPL-MCNC: 25 MG/DL (ref 7–18)
BUN/CREAT SERPL: 26 (ref 12–20)
CALCIUM SERPL-MCNC: 9.4 MG/DL (ref 8.5–10.1)
CHLORIDE SERPL-SCNC: 107 MMOL/L (ref 100–108)
CO2 SERPL-SCNC: 29 MMOL/L (ref 21–32)
CREAT SERPL-MCNC: 0.97 MG/DL (ref 0.6–1.3)
GLUCOSE SERPL-MCNC: 184 MG/DL (ref 74–99)
POTASSIUM SERPL-SCNC: 4.8 MMOL/L (ref 3.5–5.5)
SODIUM SERPL-SCNC: 141 MMOL/L (ref 136–145)

## 2018-03-16 PROCEDURE — 36415 COLL VENOUS BLD VENIPUNCTURE: CPT | Performed by: INTERNAL MEDICINE

## 2018-03-16 PROCEDURE — 83880 ASSAY OF NATRIURETIC PEPTIDE: CPT | Performed by: INTERNAL MEDICINE

## 2018-03-16 PROCEDURE — 80048 BASIC METABOLIC PNL TOTAL CA: CPT | Performed by: INTERNAL MEDICINE

## 2018-03-16 NOTE — PROGRESS NOTES
Santiago You presents today for   Chief Complaint   Patient presents with    Hypertension     Pt has appt with audiograph ext week for examination to see if she needs hearing aids. Pt currently seeing [facility] physical therapy. Santiago You preferred language for health care discussion is english/other. Is someone accompanying this pt? Yes; daughter     Is the patient using any DME equipment during OV? Yes; rollator     Depression Screening:  PHQ over the last two weeks 3/16/2018 10/23/2017 7/25/2017 11/1/2016   Little interest or pleasure in doing things Not at all Several days Not at all More than half the days   Feeling down, depressed or hopeless Not at all Several days Not at all Not at all   Total Score PHQ 2 0 2 0 2       Learning Assessment:  Completed    Abuse Screening:  Abuse Screening Questionnaire 10/23/2017   Do you ever feel afraid of your partner? N   Are you in a relationship with someone who physically or mentally threatens you? N   Is it safe for you to go home? Y       Fall Risk  Fall Risk Assessment, last 12 mths 3/16/2018 1/15/2018 12/4/2017 10/23/2017 7/25/2017 2/22/2017 11/1/2016   Able to walk? Yes Yes Yes Yes Yes Yes Yes   Fall in past 12 months? No No Yes Yes No Yes Yes   Fall with injury? - - - Yes - Yes No   Number of falls in past 12 months - - 3 1 - 1 1   Fall Risk Score - - - 2 - 2 1       Health Maintenance reviewed and discussed per provider. Yes    Santiago You is due for DEXa( declined), zoster, eye,  and pneumo 13. Please order/place referral if appropriate. Advance Directive:  1. Do you have an advance directive in place? Patient Reply: Yes     2. If not, would you like material regarding how to put one in place? Patient Reply: No     Coordination of Care:  1. Have you been to the ER, urgent care clinic since your last visit? Hospitalized since your last visit? No     2.  Have you seen or consulted any other health care providers outside of the [address] [place] [address] since your last visit? I yes;  [facility]

## 2018-03-16 NOTE — MR AVS SNAPSHOT
[address] [facility] 
 
 
 [address] [facility] [address] [address] 
[phone] Patient: Noreen Sales MRN: [id number] Visit Information Date & Time Provider Department Dept. Phone Encounter #  
 3/16/2018 10:45 AM [employer] [phone] [id number] Follow-up Instructions Return in about 3 months (around 2018), or if symptoms worsen or fail to improve. Upcoming Health Maintenance Date Due DTaP/Tdap/Td series (1 - Tdap) 1947 ZOSTER VACCINE AGE 60> 1986 GLAUCOMA SCREENING Q2Y 1991 MEDICARE YEARLY EXAM 2018 Pneumococcal 65+ Low/Medium Risk (2 of 2 - PPSV23) 3/16/2019 Allergies as of 3/16/2018  Review Complete On: 3/16/2018 By: Guillermo Morales LPN Severity Noted Reaction Type Reactions Novocain [Procaine]  2016    Swelling Sulfa (Sulfonamide Antibiotics)  2016    Rash Current Immunizations  Never Reviewed Name Date Influenza High Dose Vaccine PF 2017, 10/7/2016 Influenza Vaccine 10/10/2015 12:00 AM  
 Pneumococcal Conjugate (PCV-13)  Incomplete Not reviewed this visit You Were Diagnosed With   
  
 Codes Comments Essential hypertension    -  Primary ICD-10-CM: I10 
ICD-9-CM: 401.9 Bilateral hearing loss, unspecified hearing loss type     ICD-10-CM: H91.93 
ICD-9-CM: 389.9 Subjective memory complaints     ICD-10-CM: R41.89 ICD-9-CM: 780.93 Encounter for immunization     ICD-10-CM: X14 ICD-9-CM: V03.89 Vitals BP Pulse Temp Resp Height(growth percentile) Weight(growth percentile) 145/57 (BP 1 Location: Left arm, BP Patient Position: Sitting) 77 98.4 °F (36.9 °C) (Oral) 15 4' 8\" (1.422 m) 132 lb (59.9 kg) SpO2 BMI OB Status Smoking Status 96% 29.59 kg/m2 Postmenopausal Former Smoker Vitals History BMI and BSA Data Body Mass Index Body Surface Area 29.59 kg/m 2 1.54 m 2 Preferred Pharmacy Pharmacy Name Phone [address], [address] [phone] Your Updated Medication List  
  
   
This list is accurate as of 3/16/18 11:12 AM.  Always use your most recent med list.  
  
  
  
  
 albuterol 90 mcg/actuation inhaler Commonly known as:  PROVENTIL HFA, VENTOLIN HFA, PROAIR HFA Take 2 Puffs by inhalation every six (6) hours as needed for Wheezing. amoxicillin-clavulanate 500-125 mg per tablet Commonly known as:  AUGMENTIN  
TAKE 1/2 TABLET BY MOUTH ONCE A DAY AS DIRECTED FOR 90 DAYS. aspirin delayed-release 81 mg tablet Take 81 mg by mouth daily. 2 tabs  
  
 carvedilol 3.125 mg tablet Commonly known as:  Ericka Kierra Take 3.125 mg by mouth daily. CENTRUM SILVER PO Take  by mouth.  
  
 clobetasol 0.05 % Spry APPLY TO AFFECTED AREA EVERY NIGHTAT BEDTIME FOR 2 WEEKS THEN AS NEEDED TO SCALP  
  
 CRANBERRY PO Take 300 mg by mouth. cyclobenzaprine 5 mg tablet Commonly known as:  FLEXERIL Take 1 Tab by mouth three (3) times daily as needed for Muscle Spasm(s). DYMISTA 137-50 mcg/spray [place] Generic drug:  azelastine-fluticasone INSTILL 1 SPRAY TO EACH NOSTRIL TWICE A DAY  
  
 fluocinolone acetonide oil 0.01 % Drop FLUZONE HIGH-DOSE 2017-18 (PF) Syrg injection Generic drug:  influenza vaccine 2017-18 (65 yrs+)(PF)  
TO BE ADMINISTERED BY PHARMACIST FOR IMMUNIZATION  
  
 inhalational spacing device Use as directed with inhaler  
  
 ketoconazole 2 % shampoo Commonly known as:  NIZORAL  
  
 meloxicam 7.5 mg tablet Commonly known as:  MOBIC Take 1 Tab by mouth daily. nitroglycerin 0.4 mg SL tablet Commonly known as:  NITROSTAT One tab sublingual every 5 min for relief of pain x 3 as necessary. Report to ER if pain lasts longer than 30 min  
  
 pravastatin 40 mg tablet Commonly known as:  PRAVACHOL Take 1 Tab by mouth nightly. PREMARIN 0.625 mg/gram vaginal cream  
Generic drug:  conjugated estrogens APPLY A SMALL AMOUNT TO AFFECTED AREA EVERY NIGHT AS DIRECTED PRESERVISION LUTEIN PO Take  by mouth. PROBIOTIC PO Take  by mouth daily. traMADol 50 mg tablet Commonly known as:  ULTRAM  
Take 1 Tab by mouth every eight (8) hours as needed for Pain. Max Daily Amount: 150 mg. Indications: Pain  
  
 trospium 60 mg capsule Commonly known as:  SANCTURA XL Take 60 mg by mouth Daily (before breakfast). We Performed the Following ADMIN INFLUENZA VIRUS VAC [ [facility]] PNEUMOCOCCAL CONJ VACCINE 13 VALENT IM [id number] CPT(R)] Follow-up Instructions Return in about 3 months (around 6/16/2018), or if symptoms worsen or fail to improve. To-Do List   
 03/16/2018 Lab:  METABOLIC PANEL, BASIC   
  
 03/16/2018 Lab:  NT-PRO BNP Introducing [facility] & HEALTH SERVICES! Dear Freda Connor: Thank you for requesting a Chat& (ChatAnd) account. Our records indicate that you already have an active Chat& (ChatAnd) account. You can access your account anytime at [email or web address]. [email or web address] Did you know that you can access your hospital and ER discharge instructions at any time in Chat& (ChatAnd)? You can also review all of your test results from your hospital stay or ER visit. Additional Information If you have questions, please visit the Frequently Asked Questions section of the Chat& (ChatAnd) website at [email or web address]/. Remember, Chat& (ChatAnd) is NOT to be used for urgent needs. For medical emergencies, dial 911. Now available from your iPhone and Android! Please provide this summary of care documentation to your next provider. Your primary care clinician is listed as Raul De. If you have any questions after today's visit, please call [phone].

## 2018-03-16 NOTE — PROGRESS NOTES
HISTORY OF PRESENT ILLNESS  Drake Maguire is a 80 y.o. female. HPI Comments: 81 yo female here for f/u of HTN. BP has been stable. No CP. Hearing loss - has appt for hearing aid evaluation  Feels her memory is getting worse, daughter does not. Recent testing by PT good (19/21)  Respiratory sx improved. Did have elevated BNP last labs. Back pain sig improved with Mobic. Review of Systems   Constitutional: Negative for chills, fever and weight loss. HENT: Negative for congestion and ear pain. Eyes: Negative for blurred vision and pain. Respiratory: Negative for cough and shortness of breath. Cardiovascular: Negative for chest pain, palpitations and leg swelling. Gastrointestinal: Negative for nausea and vomiting. Genitourinary: Negative for frequency and urgency. Musculoskeletal: Negative for joint pain and myalgias. Neurological: Negative for dizziness, tingling and headaches. Psychiatric/Behavioral: Positive for memory loss. Negative for depression. The patient is not nervous/anxious. Past Medical History:   Diagnosis Date    Arthritis     CAD (coronary artery disease)     Cancer ([facility] [address]     breast and colon    GERD (gastroesophageal reflux disease)     High cholesterol     Hypertension     Urinary incontinence      Current Outpatient Prescriptions on File Prior to Visit   Medication Sig Dispense Refill    meloxicam (MOBIC) 7.5 mg tablet Take 1 Tab by mouth daily. 90 Tab 1    CRANBERRY FRUIT EXTRACT (CRANBERRY PO) Take 300 mg by mouth.  cyclobenzaprine (FLEXERIL) 5 mg tablet Take 1 Tab by mouth three (3) times daily as needed for Muscle Spasm(s). 30 Tab 2    traMADol (ULTRAM) 50 mg tablet Take 1 Tab by mouth every eight (8) hours as needed for Pain. Max Daily Amount: 150 mg.  Indications: Pain 30 Tab 0    clobetasol 0.05 % spry APPLY TO AFFECTED AREA EVERY NIGHTAT BEDTIME FOR 2 WEEKS THEN AS NEEDED TO SCALP  2    nitroglycerin (NITROSTAT) 0.4 mg SL tablet One tab sublingual every 5 min for relief of pain x 3 as necessary. Report to ER if pain lasts longer than 30 min      pravastatin (PRAVACHOL) 40 mg tablet Take 1 Tab by mouth nightly. 90 Tab 3    fluocinolone acetonide oil 0.01 % drop       ketoconazole (NIZORAL) 2 % shampoo       VIT C/VIT E AC/LUT/COPPER/ZINC (PRESERVISION LUTEIN PO) Take  by mouth.  albuterol (PROVENTIL HFA, VENTOLIN HFA, PROAIR HFA) 90 mcg/actuation inhaler Take 2 Puffs by inhalation every six (6) hours as needed for Wheezing. 1 Inhaler 3    inhalational spacing device Use as directed with inhaler 1 Device 0    PREMARIN 0.625 mg/gram vaginal cream APPLY A SMALL AMOUNT TO AFFECTED AREA EVERY NIGHT AS DIRECTED  0    amoxicillin-clavulanate (AUGMENTIN) 500-125 mg per tablet TAKE 1/2 TABLET BY MOUTH ONCE A DAY AS DIRECTED FOR 90 DAYS.  0    DYMISTA 137-50 mcg/spray spry INSTILL 1 SPRAY TO EACH NOSTRIL TWICE A DAY  12    FOLIC ACID/MULTIVIT-MIN/LUTEIN (CENTRUM SILVER PO) Take  by mouth.  trospium (SANCTURA XL) 60 mg capsule Take 60 mg by mouth Daily (before breakfast).  carvedilol (COREG) 3.125 mg tablet Take 3.125 mg by mouth daily. 0    aspirin delayed-release 81 mg tablet Take 81 mg by mouth daily. 2 tabs      LACTOBACILLUS ACIDOPHILUS (PROBIOTIC PO) Take  by mouth daily.  FLUZONE HIGH-DOSE 2017-18, PF, syrg injection TO BE ADMINISTERED BY PHARMACIST FOR IMMUNIZATION  0     No current facility-administered medications on file prior to visit. Physical Exam   Constitutional: She appears well-developed and well-nourished. No distress. /57 (BP 1 Location: Left arm, BP Patient Position: Sitting)  Pulse 77  Temp 98.4 °F (36.9 °C) (Oral)   Resp 15  Ht 4' 8\" (1.422 m)  Wt 132 lb (59.9 kg)  SpO2 96%  BMI 29.59 kg/m2     HENT:   + cerumen debris   Eyes: EOM are normal. Right eye exhibits no discharge. Left eye exhibits no discharge. No scleral icterus. Neck: Neck supple.    Cardiovascular: Normal rate, regular rhythm and normal heart sounds. Exam reveals no gallop and no friction rub. No murmur heard. Pulmonary/Chest: Effort normal and breath sounds normal. No respiratory distress. She has no wheezes. She has no rales. Musculoskeletal: She exhibits no edema or tenderness. Lymphadenopathy:     She has no cervical adenopathy. Neurological: She is alert. She exhibits normal muscle tone. Skin: Skin is warm and dry. Psychiatric: She has a normal mood and affect. Lab Results   Component Value Date/Time    Sodium 136 01/15/2018 11:40 AM    Potassium 4.8 01/15/2018 11:40 AM    Chloride 100 01/15/2018 11:40 AM    CO2 28 01/15/2018 11:40 AM    Anion gap 8 01/15/2018 11:40 AM    Glucose 163 (H) 01/15/2018 11:40 AM    BUN 20 (H) 01/15/2018 11:40 AM    Creatinine 1.11 01/15/2018 11:40 AM    BUN/Creatinine ratio 18 01/15/2018 11:40 AM    GFR est AA 56 (L) 01/15/2018 11:40 AM    GFR est non-AA 46 (L) 01/15/2018 11:40 AM    Calcium 9.2 01/15/2018 11:40 AM    Bilirubin, total 0.6 01/15/2018 11:40 AM    AST (SGOT) 24 01/15/2018 11:40 AM    Alk. phosphatase 68 01/15/2018 11:40 AM    Protein, total 6.7 01/15/2018 11:40 AM    Albumin 3.5 01/15/2018 11:40 AM    Globulin 3.2 01/15/2018 11:40 AM    A-G Ratio 1.1 01/15/2018 11:40 AM    ALT (SGPT) 24 01/15/2018 11:40 AM     Lab Results   Component Value Date/Time    WBC 5.5 01/15/2018 11:40 AM    HGB 14.3 01/15/2018 11:40 AM    HCT 43.4 01/15/2018 11:40 AM    PLATELET 564 49/66/5562 11:40 AM    MCV 92.9 01/15/2018 11:40 AM     Lab Results   Component Value Date/Time    NT pro-BNP 1956 (H) 01/15/2018 11:40 AM       ASSESSMENT and PLAN    ICD-10-CM ICD-9-CM    1. Essential hypertension U71 046.3 METABOLIC PANEL, BASIC      NT-PRO BNP      METABOLIC PANEL, BASIC      NT-PRO BNP   2. Bilateral hearing loss, unspecified hearing loss type H91.93 389.9    3. Subjective memory complaints R41.89 780.93    4.  Encounter for immunization Z23 V03.89 PNEUMOCOCCAL CONJ VACCINE 13 VALENT IM ADMIN INFLUENZA VIRUS VAC     Doing well at this time. Cognitive testing has been okay. Discussed impact of her hearing loss. Will see how hearing aid eval goes. Repeat labs; prevnar 13 today.

## 2018-03-22 ENCOUNTER — TELEPHONE (OUTPATIENT)
Dept: INTERNAL MEDICINE CLINIC | Age: 83
End: 2018-03-22

## 2018-03-22 NOTE — TELEPHONE ENCOUNTER
Per Dr Debi Vazquez results letter: Your recent labs have improved. I am glad you are feeling better.

## 2018-03-23 NOTE — TELEPHONE ENCOUNTER
Attempted to contact pt at  number, no answer. Lvm for pt to return call to office at [phone]. Will continue to try to contact pt.

## 2018-05-14 ENCOUNTER — TELEPHONE (OUTPATIENT)
Dept: INTERNAL MEDICINE CLINIC | Age: 83
End: 2018-05-14

## 2018-05-15 NOTE — TELEPHONE ENCOUNTER
2 Pt Identifers verified spoke w/ daughter in re:to mammogram questions and relayed 's message to pt's daughter;verified understanding.

## 2018-05-15 NOTE — TELEPHONE ENCOUNTER
Her mammogram was fine in 2017. She does not need another one at this time and we can further discuss details at her next visit.

## 2018-05-31 RX ORDER — PRAVASTATIN SODIUM 40 MG/1
TABLET ORAL
Qty: 90 TAB | Refills: 3 | Status: SHIPPED | OUTPATIENT
Start: 2018-05-31 | End: 2018-12-03

## 2018-06-07 ENCOUNTER — HOSPITAL ENCOUNTER (OUTPATIENT)
Dept: MAMMOGRAPHY | Age: 83
Discharge: HOME | End: 2018-06-07
Attending: INTERNAL MEDICINE
Payer: MEDICARE

## 2018-06-07 DIAGNOSIS — Z12.31 VISIT FOR SCREENING MAMMOGRAM: ICD-10-CM

## 2018-06-07 PROCEDURE — 77063 BREAST TOMOSYNTHESIS BI: CPT

## 2018-06-13 RX ORDER — MELOXICAM 7.5 MG/1
TABLET ORAL
Qty: 90 TAB | Refills: 1 | Status: SHIPPED | OUTPATIENT
Start: 2018-06-13 | End: 2018-06-21 | Stop reason: SDUPTHER

## 2018-06-21 ENCOUNTER — OFFICE VISIT (OUTPATIENT)
Dept: INTERNAL MEDICINE CLINIC | Age: 83
End: 2018-06-21

## 2018-06-21 VITALS
HEIGHT: 56 IN | RESPIRATION RATE: 18 BRPM | SYSTOLIC BLOOD PRESSURE: 127 MMHG | BODY MASS INDEX: 30.32 KG/M2 | HEART RATE: 79 BPM | DIASTOLIC BLOOD PRESSURE: 47 MMHG | TEMPERATURE: 97.2 F | WEIGHT: 134.8 LBS | OXYGEN SATURATION: 96 %

## 2018-06-21 DIAGNOSIS — R68.89 ITCHY EYES: ICD-10-CM

## 2018-06-21 DIAGNOSIS — M51.36 DDD (DEGENERATIVE DISC DISEASE), LUMBAR: Primary | ICD-10-CM

## 2018-06-21 DIAGNOSIS — I10 ESSENTIAL HYPERTENSION: ICD-10-CM

## 2018-06-21 RX ORDER — MELOXICAM 7.5 MG/1
TABLET ORAL
Qty: 90 TAB | Refills: 1 | Status: SHIPPED | OUTPATIENT
Start: 2018-06-21 | End: 2018-12-26

## 2018-06-21 NOTE — MR AVS SNAPSHOT
[address] [place] [address] 
 
 
 [address] [facility] [address] 
[phone] Patient: Neela Stone MRN: [id number] Visit Information Date & Time Provider Department Dept. Phone Encounter #  
 2018  1:45 PM Magaly Asim [phone] Follow-up Instructions Return in about 3 months (around 2018), or if symptoms worsen or fail to improve, for labs. Upcoming Health Maintenance Date Due DTaP/Tdap/Td series (1 - Tdap) 1947 ZOSTER VACCINE AGE 60> 1986 GLAUCOMA SCREENING Q2Y 1991 MEDICARE YEARLY EXAM 2018 Influenza Age 5 to Adult 2018 Pneumococcal 65+ Low/Medium Risk (2 of 2 - PPSV23) 3/16/2019 Allergies as of 2018  Review Complete On: 2018 By: Yasmeen Bryant Severity Noted Reaction Type Reactions Novocain [Procaine]  2016    Swelling Sulfa (Sulfonamide Antibiotics)  2016    Rash Current Immunizations  Reviewed on 3/16/2018 Name Date Influenza High Dose Vaccine PF 2017, 10/7/2016 Influenza Vaccine 10/10/2015 12:00 AM  
 Pneumococcal Conjugate (PCV-13) 3/16/2018 11:25 AM  
  
 Not reviewed this visit You Were Diagnosed With   
  
 Codes Comments DDD (degenerative disc disease), lumbar    -  Primary ICD-10-CM: M51.36 
ICD-9-CM: 722.52 Essential hypertension     ICD-10-CM: I10 
ICD-9-CM: 401.9 Itchy eyes     ICD-10-CM: H57.8 ICD-9-CM: 379.99 Vitals BP Pulse Temp Resp Height(growth percentile) Weight(growth percentile) 127/47 (BP 1 Location: Left arm, BP Patient Position: Sitting) 79 97.2 °F (36.2 °C) (Oral) 18 4' 8\" (1.422 m) 134 lb 12.8 oz (61.1 kg) SpO2 BMI OB Status Smoking Status 96% 30.22 kg/m2 Postmenopausal Former Smoker BMI and BSA Data Body Mass Index Body Surface Area  
 30.22 kg/m 2 1.55 m 2 Preferred Pharmacy Pharmacy Name Phone [address], [address] [phone] Your Updated Medication List  
  
   
This list is accurate as of 6/21/18  2:35 PM.  Always use your most recent med list.  
  
  
  
  
 albuterol 90 mcg/actuation inhaler Commonly known as:  PROVENTIL HFA, VENTOLIN HFA, PROAIR HFA Take 2 Puffs by inhalation every six (6) hours as needed for Wheezing. amoxicillin-clavulanate 500-125 mg per tablet Commonly known as:  AUGMENTIN  
TAKE 1/2 TABLET BY MOUTH ONCE A DAY AS DIRECTED FOR 90 DAYS. aspirin delayed-release 81 mg tablet Take 81 mg by mouth daily. 2 tabs  
  
 carvedilol 3.125 mg tablet Commonly known as:  Kwasi Saleh Take 3.125 mg by mouth daily. clobetasol 0.05 % Spry APPLY TO AFFECTED AREA EVERY NIGHTAT BEDTIME FOR 2 WEEKS THEN AS NEEDED TO SCALP  
  
 CRANBERRY PO Take 300 mg by mouth. cyclobenzaprine 5 mg tablet Commonly known as:  FLEXERIL Take 1 Tab by mouth three (3) times daily as needed for Muscle Spasm(s). DYMISTA 137-50 mcg/spray [place] Generic drug:  azelastine-fluticasone INSTILL 1 SPRAY TO EACH NOSTRIL TWICE A DAY  
  
 fluocinolone acetonide oil 0.01 % Drop  
  
 inhalational spacing device Use as directed with inhaler  
  
 ketoconazole 2 % shampoo Commonly known as:  NIZORAL  
  
 meloxicam 7.5 mg tablet Commonly known as:  MOBIC Take 1 Tab by mouth daily. nitroglycerin 0.4 mg SL tablet Commonly known as:  NITROSTAT One tab sublingual every 5 min for relief of pain x 3 as necessary. Report to ER if pain lasts longer than 30 min  
  
 pravastatin 40 mg tablet Commonly known as:  PRAVACHOL Take 1 Tab by mouth nightly. PREMARIN 0.625 mg/gram vaginal cream  
Generic drug:  conjugated estrogens APPLY A SMALL AMOUNT TO AFFECTED AREA EVERY NIGHT AS DIRECTED PRESERVISION LUTEIN PO Take  by mouth. PROBIOTIC PO Take  by mouth daily. trospium 60 mg capsule Commonly known as:  SANCTURA XL Take 60 mg by mouth Daily (before breakfast). Prescriptions Sent to Pharmacy Refills  
 meloxicam (MOBIC) 7.5 mg tablet 1 Sig: Take 1 Tab by mouth daily. Class: Normal  
 Pharmacy: [id number], [address] [facility] #: [phone] Follow-up Instructions Return in about 3 months (around 9/21/2018), or if symptoms worsen or fail to improve, for labs. Patient Instructions Dry Eyes: Care Instructions Your Care Instructions Dry eyes can be uncomfortable. The dryness may make your eyes feel dry or hot. Your eyes may also water a lot. In some cases, dry eyes make it feel like there is sand or dirt in your eyes. From time to time, dry eyes may cause you to have blurry vision. But dry eyes don't usually cause lasting problems with vision. There are many causes of dry eyes. Sometimes dry weather, smoke, or pollution can bother the eyes. Other times, allergies or contact lenses irritate the eyes. Older people often have dry eyes because our eyes do not make as many tears as we age. In some cases, diseases can cause dry eyes. These include rheumatoid arthritis, lupus, and Sjögren's syndrome. In other cases, medicines are to blame. Your doctor may want to do tests to help find the cause of your dry eyes. You can work with your doctor to find ways to help your eyes feel better. Home treatment often helps. Follow-up care is a key part of your treatment and safety. Be sure to make and go to all appointments, and call your doctor if you are having problems. It's also a good idea to know your test results and keep a list of the medicines you take. How can you care for yourself at home? · Take breaks often when you read, watch TV, or use a computer. Close your eyes. Do not rub your eyes.  Artificial tears may help you when you do these activities. You can buy these without a prescription. · Avoid smoke and other things that irritate the eyes. · Wear sunglasses that wrap around the sides of the head. These can protect the eyes from sun, wind, dust, and dirt. · Use a vaporizer or humidifier to add moisture to your bedroom. Follow the directions for cleaning the machine. · Do not use fans while you sleep. · If you usually wear contact lenses, use rewetting drops or wear your glasses until your eyes feel better. · Be safe with medicines. Take your medicine exactly as prescribed. Call your doctor if you think you are having a problem with your medicine. · Try using artificial tears at least 4 times a day. · If you need drops more than 4 times a day, use artificial tears without preservatives. They may irritate the eyes less. · Use a lubricating eye ointment or eye gel at bedtime. These are thicker and last longer, so you may have less burning, dryness, and itching when you wake up. Be aware that they may blur your vision for a short time. · To put in eyedrops or ointment: ¨ Tilt your head back, and pull the lower eyelid down with one finger. ¨ Drop or squirt the medicine inside the lower lid. ¨ Close your eye for 30 to 60 seconds to let the drops or ointment move around. ¨ Do not touch the ointment or dropper tip to your eyelashes or any other surface. · Put a warm, moist cloth on your eyelids every morning for about 5 minutes. Then massage your eyelids lightly. This helps increase the natural wetness of your eyes. When should you call for help? Watch closely for changes in your health, and be sure to contact your doctor if: 
? · Your eyes are still dry, irritated, or teary, and artificial tears do not help. ? · You do not get better as expected. Where can you learn more? Go to http://radha-abdiaziz.info/. Enter D231 in the search box to learn more about \"Dry Eyes: Care Instructions. \" 
 Current as of: March 3, 2017 Content Version: 11.4 © 4975-7389 Healthwise, [employer]. Care instructions adapted under license by [employer] (which disclaims liability or warranty for this information). If you have questions about a medical condition or this instruction, always ask your healthcare professional. [address] any warranty or liability for your use of this information. Introducing [facility]! Dear Emmie Weir: Thank you for requesting a [employer] account. Our records indicate that you already have an active [employer] account. You can access your account anytime at [email or web address] Did you know that you can access your hospital and ER discharge instructions at any time in [employer]? You can also review all of your test results from your hospital stay or ER visit. Additional Information If you have questions, please visit the Frequently Asked Questions section of the [employer] website at [email or web address] Remember, [employer] is NOT to be used for urgent needs. For medical emergencies, dial 911. Now available from your iPhone and Android! Please provide this summary of care documentation to your next provider. Your primary care clinician is listed as Raul De. If you have any questions after today's visit, please call [phone].

## 2018-06-21 NOTE — PATIENT INSTRUCTIONS
Dry Eyes: Care Instructions  Your Care Instructions    Dry eyes can be uncomfortable. The dryness may make your eyes feel dry or hot. Your eyes may also water a lot. In some cases, dry eyes make it feel like there is sand or dirt in your eyes. From time to time, dry eyes may cause you to have blurry vision. But dry eyes don't usually cause lasting problems with vision. There are many causes of dry eyes. Sometimes dry weather, smoke, or pollution can bother the eyes. Other times, allergies or contact lenses irritate the eyes. Older people often have dry eyes because our eyes do not make as many tears as we age. In some cases, diseases can cause dry eyes. These include rheumatoid arthritis, lupus, and Sjögren's syndrome. In other cases, medicines are to blame. Your doctor may want to do tests to help find the cause of your dry eyes. You can work with your doctor to find ways to help your eyes feel better. Home treatment often helps. Follow-up care is a key part of your treatment and safety. Be sure to make and go to all appointments, and call your doctor if you are having problems. It's also a good idea to know your test results and keep a list of the medicines you take. How can you care for yourself at home? · Take breaks often when you read, watch TV, or use a computer. Close your eyes. Do not rub your eyes. Artificial tears may help you when you do these activities. You can buy these without a prescription. · Avoid smoke and other things that irritate the eyes. · Wear sunglasses that wrap around the sides of the head. These can protect the eyes from sun, wind, dust, and dirt. · Use a vaporizer or humidifier to add moisture to your bedroom. Follow the directions for cleaning the machine. · Do not use fans while you sleep. · If you usually wear contact lenses, use rewetting drops or wear your glasses until your eyes feel better. · Be safe with medicines. Take your medicine exactly as prescribed.  Call your doctor if you think you are having a problem with your medicine. · Try using artificial tears at least 4 times a day. · If you need drops more than 4 times a day, use artificial tears without preservatives. They may irritate the eyes less. · Use a lubricating eye ointment or eye gel at bedtime. These are thicker and last longer, so you may have less burning, dryness, and itching when you wake up. Be aware that they may blur your vision for a short time. · To put in eyedrops or ointment:  ¨ Tilt your head back, and pull the lower eyelid down with one finger. ¨ Drop or squirt the medicine inside the lower lid. ¨ Close your eye for 30 to 60 seconds to let the drops or ointment move around. ¨ Do not touch the ointment or dropper tip to your eyelashes or any other surface. · Put a warm, moist cloth on your eyelids every morning for about 5 minutes. Then massage your eyelids lightly. This helps increase the natural wetness of your eyes. When should you call for help? Watch closely for changes in your health, and be sure to contact your doctor if:  ? · Your eyes are still dry, irritated, or teary, and artificial tears do not help. ? · You do not get better as expected. Where can you learn more? Go to http://radha-abdiaziz.info/. Enter D231 in the search box to learn more about \"Dry Eyes: Care Instructions. \"  Current as of: March 3, 2017  Content Version: 11.4  © 3425-6080 [employer]. Care instructions adapted under license by [employer] (which disclaims liability or warranty for this information). If you have questions about a medical condition or this instruction, always ask your healthcare professional. [address] any warranty or liability for your use of this information.

## 2018-06-21 NOTE — PROGRESS NOTES
HISTORY OF PRESENT ILLNESS  Andrew Marinelli is a 80 y.o. female. HPI Comments: 81 yo female here for f/u of HTN, c/o eyes itching the past few days. Is followed by ophthalmology. Systane ordered. Denies crusting, vision change, pain. HTN well controlled. L sciatica. PT helped. Mobic has been very helpful. UI continues to be a problem. Followed by urogyn. Review of Systems   Constitutional: Negative for chills, fever and weight loss. HENT: Negative for congestion and ear pain. Eyes: Negative for blurred vision and pain. Respiratory: Negative for cough and shortness of breath. Cardiovascular: Negative for chest pain, palpitations and leg swelling. Gastrointestinal: Negative for nausea and vomiting. Genitourinary: Positive for frequency and urgency. Musculoskeletal: Negative for joint pain and myalgias. Skin: Negative for itching and rash. Neurological: Negative for dizziness, tingling and headaches. Psychiatric/Behavioral: Negative for depression. The patient is not nervous/anxious. Past Medical History:   Diagnosis Date    Arthritis     CAD (coronary artery disease)     Cancer ([facility] [address]     breast and colon    GERD (gastroesophageal reflux disease)     High cholesterol     Hypertension     Urinary incontinence      Current Outpatient Prescriptions on File Prior to Visit   Medication Sig Dispense Refill    meloxicam (MOBIC) 7.5 mg tablet Take 1 Tab by mouth daily. 90 Tab 1    pravastatin (PRAVACHOL) 40 mg tablet Take 1 Tab by mouth nightly. 90 Tab 3    CRANBERRY FRUIT EXTRACT (CRANBERRY PO) Take 300 mg by mouth.  cyclobenzaprine (FLEXERIL) 5 mg tablet Take 1 Tab by mouth three (3) times daily as needed for Muscle Spasm(s). 30 Tab 2    clobetasol 0.05 % spry APPLY TO AFFECTED AREA EVERY NIGHTAT BEDTIME FOR 2 WEEKS THEN AS NEEDED TO SCALP  2    nitroglycerin (NITROSTAT) 0.4 mg SL tablet One tab sublingual every 5 min for relief of pain x 3 as necessary.   Report to ER if pain lasts longer than 30 min      fluocinolone acetonide oil 0.01 % drop       ketoconazole (NIZORAL) 2 % shampoo       VIT C/VIT E AC/LUT/COPPER/ZINC (PRESERVISION LUTEIN PO) Take  by mouth.  albuterol (PROVENTIL HFA, VENTOLIN HFA, PROAIR HFA) 90 mcg/actuation inhaler Take 2 Puffs by inhalation every six (6) hours as needed for Wheezing. 1 Inhaler 3    inhalational spacing device Use as directed with inhaler 1 Device 0    PREMARIN 0.625 mg/gram vaginal cream APPLY A SMALL AMOUNT TO AFFECTED AREA EVERY NIGHT AS DIRECTED  0    amoxicillin-clavulanate (AUGMENTIN) 500-125 mg per tablet TAKE 1/2 TABLET BY MOUTH ONCE A DAY AS DIRECTED FOR 90 DAYS.  0    DYMISTA 137-50 mcg/spray spry INSTILL 1 SPRAY TO EACH NOSTRIL TWICE A DAY  12    trospium (SANCTURA XL) 60 mg capsule Take 60 mg by mouth Daily (before breakfast).  carvedilol (COREG) 3.125 mg tablet Take 3.125 mg by mouth daily. 0    aspirin delayed-release 81 mg tablet Take 81 mg by mouth daily. 2 tabs      LACTOBACILLUS ACIDOPHILUS (PROBIOTIC PO) Take  by mouth daily.  traMADol (ULTRAM) 50 mg tablet Take 1 Tab by mouth every eight (8) hours as needed for Pain. Max Daily Amount: 150 mg. Indications: Pain 30 Tab 0    FLUZONE HIGH-DOSE 2017-18, PF, syrg injection TO BE ADMINISTERED BY PHARMACIST FOR IMMUNIZATION  0    FOLIC ACID/MULTIVIT-MIN/LUTEIN (CENTRUM SILVER PO) Take  by mouth. No current facility-administered medications on file prior to visit. Social History   Substance Use Topics    Smoking status: Former Smoker     Types: Cigarettes     Quit date: 7/21/1983    Smokeless tobacco: Never Used    Alcohol use Yes      Comment: rare     Physical Exam   Constitutional: She appears well-developed and well-nourished. No distress.    /47 (BP 1 Location: Left arm, BP Patient Position: Sitting)  Pulse 79  Temp 97.2 °F (36.2 °C) (Oral)   Resp 18  Ht 4' 8\" (1.422 m)  Wt 134 lb 12.8 oz (61.1 kg)  SpO2 96%  BMI 30.22 kg/m2     Eyes: EOM are normal. Right eye exhibits no discharge. Left eye exhibits no discharge. No scleral icterus. Neck: Neck supple. Cardiovascular: Normal rate, regular rhythm and normal heart sounds. Exam reveals no gallop and no friction rub. No murmur heard. Pulmonary/Chest: Effort normal and breath sounds normal. No respiratory distress. She has no wheezes. She has no rales. Musculoskeletal: She exhibits no edema or tenderness. Lymphadenopathy:     She has no cervical adenopathy. Neurological: She is alert. She exhibits normal muscle tone. Skin: Skin is warm and dry. Psychiatric: She has a normal mood and affect. Lab Results   Component Value Date/Time    Sodium 141 03/16/2018 11:24 AM    Potassium 4.8 03/16/2018 11:24 AM    Chloride 107 03/16/2018 11:24 AM    CO2 29 03/16/2018 11:24 AM    Anion gap 5 03/16/2018 11:24 AM    Glucose 184 (H) 03/16/2018 11:24 AM    BUN 25 (H) 03/16/2018 11:24 AM    Creatinine 0.97 03/16/2018 11:24 AM    BUN/Creatinine ratio 26 (H) 03/16/2018 11:24 AM    GFR est AA >60 03/16/2018 11:24 AM    GFR est non-AA 54 (L) 03/16/2018 11:24 AM    Calcium 9.4 03/16/2018 11:24 AM    Bilirubin, total 0.6 01/15/2018 11:40 AM    AST (SGOT) 24 01/15/2018 11:40 AM    Alk. phosphatase 68 01/15/2018 11:40 AM    Protein, total 6.7 01/15/2018 11:40 AM    Albumin 3.5 01/15/2018 11:40 AM    Globulin 3.2 01/15/2018 11:40 AM    A-G Ratio 1.1 01/15/2018 11:40 AM    ALT (SGPT) 24 01/15/2018 11:40 AM     ASSESSMENT and PLAN    ICD-10-CM ICD-9-CM    1. DDD (degenerative disc disease), lumbar M51.36 722.52    2. Essential hypertension I10 401.9    3. Itchy eyes H57.8 379.99      Continue with Mobic which has been helpful for her pain. Can repeat labs next visit. Continue current medication. Consider medication such as patanol if natural tears not effective.

## 2018-06-21 NOTE — PROGRESS NOTES
ROOM # 18  Identified pt with two pt identifiers(name and ). Reviewed record in preparation for visit and have obtained necessary documentation. Chief Complaint   Patient presents with    Follow-up     htn    Watery Eyes     itching,redness      Santhosh Gracia preferred language for health care discussion is english/other. Is the patient using any DME equipment during OV? Vanesa Hutchins is due for:  Health Maintenance Due   Topic    DTaP/Tdap/Td series (1 - Tdap)    ZOSTER VACCINE AGE 60>     GLAUCOMA SCREENING Q2Y      Health Maintenance reviewed and discussed per provider  Please order/place referral if appropriate. Advance Directive:  1. Do you have an advance directive in place? Patient Reply: NO    2. If not, would you like material regarding how to put one in place? NO    Coordination of Care:  1. Have you been to the ER, urgent care clinic since your last visit? Hospitalized since your last visit? NO    2. Have you seen or consulted any other health care providers outside of the [address] [place] [address] since your last visit? Include any pap smears or colon screening. NO    Patient is accompanied by daughter I have received verbal consent from Santhosh Garcia to discuss any/all medical information while they are present in the room.     Learning Assessment:  Learning Assessment 3/16/2018   PRIMARY LEARNER Patient   BARRIERS PRIMARY LEARNER HEARING   CO-LEARNER CAREGIVER No   PRIMARY LANGUAGE ENGLISH   LEARNER PREFERENCE PRIMARY DEMONSTRATION   ANSWERED BY patient   RELATIONSHIP SELF     Depression Screening:  PHQ over the last two weeks 2018 3/16/2018 10/23/2017 2017 2016   Little interest or pleasure in doing things Not at all Not at all Several days Not at all More than half the days   Feeling down, depressed or hopeless Not at all Not at all Several days Not at all Not at all   Total Score PHQ 2 0 0 2 0 2     Abuse Screening:  Abuse Screening Questionnaire 10/23/2017   Do you ever feel afraid of your partner? N   Are you in a relationship with someone who physically or mentally threatens you? N   Is it safe for you to go home? Y     Fall Risk  Fall Risk Assessment, last 12 mths 6/21/2018 3/16/2018 1/15/2018 12/4/2017 10/23/2017 7/25/2017 2/22/2017   Able to walk? Yes Yes Yes Yes Yes Yes Yes   Fall in past 12 months?  No No No Yes Yes No Yes   Fall with injury? - - - - Yes - Yes   Number of falls in past 12 months - - - 3 1 - 1   Fall Risk Score - - - - 2 - 2

## 2018-08-16 RX ORDER — MELOXICAM 7.5 MG/1
TABLET ORAL
Qty: 90 TAB | Refills: 1 | Status: SHIPPED | OUTPATIENT
Start: 2018-08-16 | End: 2018-09-05 | Stop reason: SDUPTHER

## 2018-08-24 RX ORDER — PRAVASTATIN SODIUM 40 MG/1
TABLET ORAL
Qty: 90 TAB | Refills: 3 | Status: SHIPPED | OUTPATIENT
Start: 2018-08-24 | End: 2018-09-05 | Stop reason: SDUPTHER

## 2018-08-29 ENCOUNTER — TELEPHONE (OUTPATIENT)
Dept: INTERNAL MEDICINE CLINIC | Age: 83
End: 2018-08-29

## 2018-08-29 NOTE — TELEPHONE ENCOUNTER
Patient's daughter called in to see if the new 2 shot series for shingles was appropriate and recommended for Patient. Daughter requests callback with information.

## 2018-09-04 NOTE — TELEPHONE ENCOUNTER
Pt daughter contacted at home number. 2 pt identifiers confirmed. Pt daughter informed of below. Pt daughter verbalized understanding. No other questions at this time.

## 2018-09-05 ENCOUNTER — HOSPITAL ENCOUNTER (OUTPATIENT)
Dept: LAB | Age: 83
Discharge: HOME | End: 2018-09-05
Payer: MEDICARE

## 2018-09-05 ENCOUNTER — OFFICE VISIT (OUTPATIENT)
Dept: INTERNAL MEDICINE CLINIC | Age: 83
End: 2018-09-05

## 2018-09-05 VITALS
SYSTOLIC BLOOD PRESSURE: 136 MMHG | HEIGHT: 56 IN | RESPIRATION RATE: 20 BRPM | HEART RATE: 91 BPM | WEIGHT: 133 LBS | TEMPERATURE: 98.2 F | DIASTOLIC BLOOD PRESSURE: 53 MMHG | BODY MASS INDEX: 29.92 KG/M2 | OXYGEN SATURATION: 95 %

## 2018-09-05 DIAGNOSIS — R10.9 ABDOMINAL DISCOMFORT: ICD-10-CM

## 2018-09-05 DIAGNOSIS — R06.02 SOB (SHORTNESS OF BREATH): ICD-10-CM

## 2018-09-05 DIAGNOSIS — Z85.038 HISTORY OF COLON CANCER: ICD-10-CM

## 2018-09-05 DIAGNOSIS — Z87.440 HISTORY OF RECURRENT UTIS: ICD-10-CM

## 2018-09-05 DIAGNOSIS — R10.9 ABDOMINAL DISCOMFORT: Primary | ICD-10-CM

## 2018-09-05 DIAGNOSIS — I10 ESSENTIAL HYPERTENSION: ICD-10-CM

## 2018-09-05 DIAGNOSIS — Z85.3 HX: BREAST CANCER: ICD-10-CM

## 2018-09-05 LAB
ALBUMIN SERPL-MCNC: 3.7 G/DL (ref 3.4–5)
ALBUMIN/GLOB SERPL: 1.1 {RATIO} (ref 0.8–1.7)
ALP SERPL-CCNC: 78 U/L (ref 45–117)
ALT SERPL-CCNC: 19 U/L (ref 13–56)
ANION GAP SERPL CALC-SCNC: 8 MMOL/L (ref 3–18)
AST SERPL-CCNC: 18 U/L (ref 15–37)
BASOPHILS # BLD: 0 K/UL (ref 0–0.1)
BASOPHILS NFR BLD: 1 % (ref 0–2)
BILIRUB SERPL-MCNC: 0.7 MG/DL (ref 0.2–1)
BUN SERPL-MCNC: 24 MG/DL (ref 7–18)
BUN/CREAT SERPL: 26 (ref 12–20)
CALCIUM SERPL-MCNC: 9.9 MG/DL (ref 8.5–10.1)
CHLORIDE SERPL-SCNC: 104 MMOL/L (ref 100–108)
CO2 SERPL-SCNC: 30 MMOL/L (ref 21–32)
CREAT SERPL-MCNC: 0.93 MG/DL (ref 0.6–1.3)
DIFFERENTIAL METHOD BLD: ABNORMAL
EOSINOPHIL # BLD: 0.5 K/UL (ref 0–0.4)
EOSINOPHIL NFR BLD: 6 % (ref 0–5)
ERYTHROCYTE [DISTWIDTH] IN BLOOD BY AUTOMATED COUNT: 13.1 % (ref 11.6–14.5)
GLOBULIN SER CALC-MCNC: 3.4 G/DL (ref 2–4)
GLUCOSE SERPL-MCNC: 184 MG/DL (ref 74–99)
HCT VFR BLD AUTO: 44.7 % (ref 35–45)
HGB BLD-MCNC: 14.8 G/DL (ref 12–16)
LYMPHOCYTES # BLD: 1 K/UL (ref 0.9–3.6)
LYMPHOCYTES NFR BLD: 12 % (ref 21–52)
MCH RBC QN AUTO: 30.5 PG (ref 24–34)
MCHC RBC AUTO-ENTMCNC: 33.1 G/DL (ref 31–37)
MCV RBC AUTO: 92.2 FL (ref 74–97)
MONOCYTES # BLD: 0.8 K/UL (ref 0.05–1.2)
MONOCYTES NFR BLD: 9 % (ref 3–10)
NEUTS SEG # BLD: 6.1 K/UL (ref 1.8–8)
NEUTS SEG NFR BLD: 72 % (ref 40–73)
PLATELET # BLD AUTO: 174 K/UL (ref 135–420)
PMV BLD AUTO: 12.3 FL (ref 9.2–11.8)
POTASSIUM SERPL-SCNC: 5 MMOL/L (ref 3.5–5.5)
PROT SERPL-MCNC: 7.1 G/DL (ref 6.4–8.2)
RBC # BLD AUTO: 4.85 M/UL (ref 4.2–5.3)
SODIUM SERPL-SCNC: 142 MMOL/L (ref 136–145)
WBC # BLD AUTO: 8.4 K/UL (ref 4.6–13.2)

## 2018-09-05 PROCEDURE — 85025 COMPLETE CBC W/AUTO DIFF WBC: CPT | Performed by: INTERNAL MEDICINE

## 2018-09-05 PROCEDURE — 80053 COMPREHEN METABOLIC PANEL: CPT | Performed by: INTERNAL MEDICINE

## 2018-09-05 PROCEDURE — 36415 COLL VENOUS BLD VENIPUNCTURE: CPT | Performed by: INTERNAL MEDICINE

## 2018-09-05 NOTE — PROGRESS NOTES
ROOM # 16  Identified pt with two pt identifiers(name and ). Reviewed record in preparation for visit and have obtained necessary documentation. Chief Complaint   Patient presents with    Abdominal Pain    Nausea      Jared Soto preferred language for health care discussion is english/other. Is the patient using any DME equipment during OV? Jeremiah Tanner is due for:  Health Maintenance Due   Topic    DTaP/Tdap/Td series (1 - Tdap)    ZOSTER VACCINE AGE 60>     GLAUCOMA SCREENING Q2Y     MEDICARE YEARLY EXAM     Influenza Age 5 to Adult      Health Maintenance reviewed and discussed per provider  Please order/place referral if appropriate. Advance Directive:  1. Do you have an advance directive in place? Patient Reply: NO    2. If not, would you like material regarding how to put one in place? NO    Coordination of Care:  1. Have you been to the ER, urgent care clinic since your last visit? Hospitalized since your last visit? NO    2. Have you seen or consulted any other health care providers outside of the [address] [place] [address] since your last visit? Include any pap smears or colon screening. NO    Patient is accompanied by daughter I have received verbal consent from Jared Soto to discuss any/all medical information while they are present in the room.     Learning Assessment:  Learning Assessment 3/16/2018   PRIMARY LEARNER Patient   BARRIERS PRIMARY LEARNER HEARING   CO-LEARNER CAREGIVER No   PRIMARY LANGUAGE ENGLISH   LEARNER PREFERENCE PRIMARY DEMONSTRATION   ANSWERED BY patient   RELATIONSHIP SELF     Depression Screening:  PHQ over the last two weeks 2018 2018 3/16/2018 10/23/2017 2017 2016   Little interest or pleasure in doing things Not at all Not at all Not at all Several days Not at all More than half the days   Feeling down, depressed, irritable, or hopeless Not at all Not at all Not at all Several days Not at all Not at all   Total Score PHQ 2 0 0 0 2 0 2     Abuse Screening:  Abuse Screening Questionnaire 10/23/2017   Do you ever feel afraid of your partner? N   Are you in a relationship with someone who physically or mentally threatens you? N   Is it safe for you to go home? Y     Fall Risk  Fall Risk Assessment, last 12 mths 9/5/2018 6/21/2018 3/16/2018 1/15/2018 12/4/2017 10/23/2017 7/25/2017   Able to walk? Yes Yes Yes Yes Yes Yes Yes   Fall in past 12 months?  No No No No Yes Yes No   Fall with injury? - - - - - Yes -   Number of falls in past 12 months - - - - 3 1 -   Fall Risk Score - - - - - 2 -

## 2018-09-05 NOTE — MR AVS SNAPSHOT
[address] [place] [address] 
 
 
 [address] [id number] 
[phone] Patient: Ayanna Santos MRN: [id number] Visit Information Date & Time Provider Department Dept. Phone Encounter #  
 2018 11:00 AM [facility] [phone] [id number] Follow-up Instructions Return in about 3 months (around 2018), or if symptoms worsen or fail to improve. Your Appointments 2018  1:45 PM  
Office Visit with MD RUDOLPH Shook [facility]) Appt Note: 3 month f/u labs [address]  
  
   
 [address] [facility] Upcoming Health Maintenance Date Due DTaP/Tdap/Td series (1 - Tdap) 1947 ZOSTER VACCINE AGE 60> 1986 GLAUCOMA SCREENING Q2Y 1991 MEDICARE YEARLY EXAM 2018 Influenza Age 5 to Adult 2018 Pneumococcal 65+ Low/Medium Risk (2 of 2 - PPSV23) 3/16/2019 Allergies as of 2018  Review Complete On: 2018 By: Marjan [place] Severity Noted Reaction Type Reactions Novocain [Procaine]  2016    Swelling Sulfa (Sulfonamide Antibiotics)  2016    Rash Current Immunizations  Reviewed on 3/16/2018 Name Date Influenza High Dose Vaccine PF 2017, 10/7/2016 Influenza Vaccine 10/10/2015 12:00 AM  
 Pneumococcal Conjugate (PCV-13) 3/16/2018 11:25 AM  
  
 Not reviewed this visit You Were Diagnosed With   
  
 Codes Comments Abdominal discomfort    -  Primary ICD-10-CM: R10.9 ICD-9-CM: 789.00 History of colon cancer     ICD-10-CM: Z85.038 
ICD-9-CM: V10.05 HX: breast cancer     ICD-10-CM: Z85.3 ICD-9-CM: V10.3 SOB (shortness of breath)     ICD-10-CM: R06.02 
ICD-9-CM: 786.05 History of recurrent UTIs     ICD-10-CM: Z87.440 ICD-9-CM: V13.02   
 Essential hypertension     ICD-10-CM: I10 
ICD-9-CM: 401.9 Vitals BP Pulse Temp Resp Height(growth percentile) Weight(growth percentile) 136/53 (BP 1 Location: Right arm, BP Patient Position: Sitting) 91 98.2 °F (36.8 °C) (Oral) 20 4' 8\" (1.422 m) 133 lb (60.3 kg) SpO2 BMI OB Status Smoking Status 95% 29.82 kg/m2 Postmenopausal Former Smoker BMI and BSA Data Body Mass Index Body Surface Area  
 29.82 kg/m 2 1.54 m 2 Preferred Pharmacy Pharmacy Name Phone [address], [address] [phone] Your Updated Medication List  
  
   
This list is accurate as of 9/5/18 11:38 AM.  Always use your most recent med list.  
  
  
  
  
 albuterol 90 mcg/actuation inhaler Commonly known as:  PROVENTIL HFA, VENTOLIN HFA, PROAIR HFA Take 2 Puffs by inhalation every six (6) hours as needed for Wheezing. amoxicillin-clavulanate 500-125 mg per tablet Commonly known as:  AUGMENTIN  
TAKE 1/2 TABLET BY MOUTH ONCE A DAY AS DIRECTED FOR 90 DAYS. aspirin delayed-release 81 mg tablet Take 81 mg by mouth daily. 2 tabs  
  
 carvedilol 3.125 mg tablet Commonly known as:  Lew Gut Take 3.125 mg by mouth daily. clobetasol 0.05 % Spry APPLY TO AFFECTED AREA EVERY NIGHTAT BEDTIME FOR 2 WEEKS THEN AS NEEDED TO SCALP  
  
 CRANBERRY PO Take 300 mg by mouth. cyclobenzaprine 5 mg tablet Commonly known as:  FLEXERIL Take 1 Tab by mouth three (3) times daily as needed for Muscle Spasm(s). DYMISTA 137-50 mcg/spray [place] Generic drug:  azelastine-fluticasone INSTILL 1 SPRAY TO EACH NOSTRIL TWICE A DAY  
  
 fluocinolone acetonide oil 0.01 % Drop  
  
 inhalational spacing device Use as directed with inhaler  
  
 ketoconazole 2 % shampoo Commonly known as:  NIZORAL  
  
 meloxicam 7.5 mg tablet Commonly known as:  MOBIC Take 1 Tab by mouth daily. nitroglycerin 0.4 mg SL tablet Commonly known as:  NITROSTAT One tab sublingual every 5 min for relief of pain x 3 as necessary. Report to ER if pain lasts longer than 30 min  
  
 pravastatin 40 mg tablet Commonly known as:  PRAVACHOL Take 1 Tab by mouth nightly. PREMARIN 0.625 mg/gram vaginal cream  
Generic drug:  conjugated estrogens APPLY A SMALL AMOUNT TO AFFECTED AREA EVERY NIGHT AS DIRECTED PRESERVISION LUTEIN PO Take  by mouth. PROBIOTIC PO Take  by mouth daily. trospium 60 mg capsule Commonly known as:  SANCTURA XL Take 60 mg by mouth Daily (before breakfast). Follow-up Instructions Return in about 3 months (around 12/5/2018), or if symptoms worsen or fail to improve. To-Do List   
 Around 09/05/2018 Imaging:  CT ABD PELV W WO CONT Around 09/05/2018 Imaging:  CT CHEST W CONT Patient Instructions Abdominal Pain: Care Instructions Your Care Instructions Abdominal pain has many possible causes. Some aren't serious and get better on their own in a few days. Others need more testing and treatment. If your pain continues or gets worse, you need to be rechecked and may need more tests to find out what is wrong. You may need surgery to correct the problem. Don't ignore new symptoms, such as fever, nausea and vomiting, urination problems, pain that gets worse, and dizziness. These may be signs of a more serious problem. Your doctor may have recommended a follow-up visit in the next 8 to 12 hours. If you are not getting better, you may need more tests or treatment. The doctor has checked you carefully, but problems can develop later. If you notice any problems or new symptoms, get medical treatment right away. Follow-up care is a key part of your treatment and safety. Be sure to make and go to all appointments, and call your doctor if you are having problems.  It's also a good idea to know your test results and keep a list of the medicines you take. How can you care for yourself at home? · Rest until you feel better. · To prevent dehydration, drink plenty of fluids, enough so that your urine is light yellow or clear like water. Choose water and other caffeine-free clear liquids until you feel better. If you have kidney, heart, or liver disease and have to limit fluids, talk with your doctor before you increase the amount of fluids you drink. · If your stomach is upset, eat mild foods, such as rice, dry toast or crackers, bananas, and applesauce. Try eating several small meals instead of two or three large ones. · Wait until 48 hours after all symptoms have gone away before you have spicy foods, alcohol, and drinks that contain caffeine. · Do not eat foods that are high in fat. · Avoid anti-inflammatory medicines such as aspirin, ibuprofen (Advil, Motrin), and naproxen (Aleve). These can cause stomach upset. Talk to your doctor if you take daily aspirin for another health problem. When should you call for help? Call 911 anytime you think you may need emergency care. For example, call if: 
  · You passed out (lost consciousness).  
  · You pass maroon or very bloody stools.  
  · You vomit blood or what looks like coffee grounds.  
  · You have new, severe belly pain.  
 Call your doctor now or seek immediate medical care if: 
  · Your pain gets worse, especially if it becomes focused in one area of your belly.  
  · You have a new or higher fever.  
  · Your stools are black and look like tar, or they have streaks of blood.  
  · You have unexpected vaginal bleeding.  
  · You have symptoms of a urinary tract infection. These may include: 
¨ Pain when you urinate. ¨ Urinating more often than usual. 
¨ Blood in your urine.  
  · You are dizzy or lightheaded, or you feel like you may faint.  
 Watch closely for changes in your health, and be sure to contact your doctor if:   · You are not getting better after 1 day (24 hours). Where can you learn more? Go to http://radha-abdiaziz.info/. Enter E243 in the search box to learn more about \"Abdominal Pain: Care Instructions. \" Current as of: November 20, 2017 Content Version: 11.7 © 6679-7340 [employer]. Care instructions adapted under license by [employer] (which disclaims liability or warranty for this information). If you have questions about a medical condition or this instruction, always ask your healthcare professional. [address] any warranty or liability for your use of this information. Introducing [facility]! Dear Demetrius Childers: Thank you for requesting a [employer] account. Our records indicate that you already have an active [employer] account. You can access your account anytime at [email or web address] Did you know that you can access your hospital and ER discharge instructions at any time in [employer]? You can also review all of your test results from your hospital stay or ER visit. Additional Information If you have questions, please visit the Frequently Asked Questions section of the [employer] website at [email or web address] Remember, [employer] is NOT to be used for urgent needs. For medical emergencies, dial 911. Now available from your iPhone and Android! Please provide this summary of care documentation to your next provider. Your primary care clinician is listed as Raul De. If you have any questions after today's visit, please call [phone].

## 2018-09-05 NOTE — PROGRESS NOTES
HISTORY OF PRESENT ILLNESS  Franny Valdovinos is a 80 y.o. female. HPI Comments: 81 yo female here for evaluation of abdominal discomfort/bloating. UTI dx a few weeks ago (~ 3 weeks). Tx with abx, reports last culture negative. Denies fevers, chills. Feels bloated. Abd pain across waistline for the past month. H/o colon CA - treated with hemicolectomy, XRT. Sister with liver CA (not sure of primary). H/o breast CA treated with lumpectomy, XRT. Last mammo okay. She does note some increased SOB. No LE edema. Leg pain - chronic issue. Recently worse L hip, thigh. No falls. Had done PT in the past but stopped so she did not run out of visits for the year. Review of Systems   Constitutional: Negative for chills, fever and weight loss. HENT: Negative for congestion and ear pain. Eyes: Negative for blurred vision and pain. Respiratory: Positive for shortness of breath. Negative for cough. Cardiovascular: Negative for chest pain, palpitations and leg swelling. Gastrointestinal: Positive for abdominal pain and nausea (intermittent). Negative for blood in stool, melena and vomiting. Genitourinary: Negative for frequency and urgency. Musculoskeletal: Positive for joint pain. Negative for myalgias. Skin: Negative for itching and rash. Neurological: Negative for dizziness, tingling and headaches. Psychiatric/Behavioral: Negative for depression. The patient is not nervous/anxious. Past Medical History:   Diagnosis Date    Arthritis     CAD (coronary artery disease)     Cancer ([facility] [address]     breast and colon    GERD (gastroesophageal reflux disease)     High cholesterol     Hypertension     Urinary incontinence      Current Outpatient Prescriptions on File Prior to Visit   Medication Sig Dispense Refill    meloxicam (MOBIC) 7.5 mg tablet Take 1 Tab by mouth daily. 90 Tab 1    pravastatin (PRAVACHOL) 40 mg tablet Take 1 Tab by mouth nightly.  90 Tab 3    CRANBERRY FRUIT EXTRACT (CRANBERRY PO) Take 300 mg by mouth.  cyclobenzaprine (FLEXERIL) 5 mg tablet Take 1 Tab by mouth three (3) times daily as needed for Muscle Spasm(s). 30 Tab 2    clobetasol 0.05 % spry APPLY TO AFFECTED AREA EVERY NIGHTAT BEDTIME FOR 2 WEEKS THEN AS NEEDED TO SCALP  2    nitroglycerin (NITROSTAT) 0.4 mg SL tablet One tab sublingual every 5 min for relief of pain x 3 as necessary. Report to ER if pain lasts longer than 30 min      fluocinolone acetonide oil 0.01 % drop       ketoconazole (NIZORAL) 2 % shampoo       VIT C/VIT E AC/LUT/COPPER/ZINC (PRESERVISION LUTEIN PO) Take  by mouth.  albuterol (PROVENTIL HFA, VENTOLIN HFA, PROAIR HFA) 90 mcg/actuation inhaler Take 2 Puffs by inhalation every six (6) hours as needed for Wheezing. 1 Inhaler 3    inhalational spacing device Use as directed with inhaler 1 Device 0    PREMARIN 0.625 mg/gram vaginal cream APPLY A SMALL AMOUNT TO AFFECTED AREA EVERY NIGHT AS DIRECTED  0    amoxicillin-clavulanate (AUGMENTIN) 500-125 mg per tablet TAKE 1/2 TABLET BY MOUTH ONCE A DAY AS DIRECTED FOR 90 DAYS.  0    DYMISTA 137-50 mcg/spray spry INSTILL 1 SPRAY TO EACH NOSTRIL TWICE A DAY  12    trospium (SANCTURA XL) 60 mg capsule Take 60 mg by mouth Daily (before breakfast).  carvedilol (COREG) 3.125 mg tablet Take 3.125 mg by mouth daily. 0    aspirin delayed-release 81 mg tablet Take 81 mg by mouth daily. 2 tabs      LACTOBACILLUS ACIDOPHILUS (PROBIOTIC PO) Take  by mouth daily. No current facility-administered medications on file prior to visit. Physical Exam   Constitutional: She appears well-developed and well-nourished. No distress. /53 (BP 1 Location: Right arm, BP Patient Position: Sitting)  Pulse 91  Temp 98.2 °F (36.8 °C) (Oral)   Resp 20  Ht 4' 8\" (1.422 m)  Wt 133 lb (60.3 kg)  SpO2 95%  BMI 29.82 kg/m2     Eyes: EOM are normal. Right eye exhibits no discharge. Left eye exhibits no discharge. No scleral icterus. Neck: Neck supple. Cardiovascular: Normal rate, regular rhythm and normal heart sounds. Exam reveals no gallop and no friction rub. No murmur heard. Pulmonary/Chest: Effort normal and breath sounds normal. No respiratory distress. She has no wheezes. She has no rales. Abdominal: Soft. Bowel sounds are normal. She exhibits distension (? mild bloating). There is no tenderness. There is no rebound and no guarding. Musculoskeletal: She exhibits no edema or tenderness. Lymphadenopathy:     She has no cervical adenopathy. Neurological: She is alert. She exhibits normal muscle tone. Skin: Skin is warm and dry. Psychiatric: She has a normal mood and affect. Lab Results   Component Value Date/Time    Sodium 141 03/16/2018 11:24 AM    Potassium 4.8 03/16/2018 11:24 AM    Chloride 107 03/16/2018 11:24 AM    CO2 29 03/16/2018 11:24 AM    Anion gap 5 03/16/2018 11:24 AM    Glucose 184 (H) 03/16/2018 11:24 AM    BUN 25 (H) 03/16/2018 11:24 AM    Creatinine 0.97 03/16/2018 11:24 AM    BUN/Creatinine ratio 26 (H) 03/16/2018 11:24 AM    GFR est AA >60 03/16/2018 11:24 AM    GFR est non-AA 54 (L) 03/16/2018 11:24 AM    Calcium 9.4 03/16/2018 11:24 AM    Bilirubin, total 0.6 01/15/2018 11:40 AM    AST (SGOT) 24 01/15/2018 11:40 AM    Alk. phosphatase 68 01/15/2018 11:40 AM    Protein, total 6.7 01/15/2018 11:40 AM    Albumin 3.5 01/15/2018 11:40 AM    Globulin 3.2 01/15/2018 11:40 AM    A-G Ratio 1.1 01/15/2018 11:40 AM    ALT (SGPT) 24 01/15/2018 11:40 AM     Lab Results   Component Value Date/Time    WBC 5.5 01/15/2018 11:40 AM    HGB 14.3 01/15/2018 11:40 AM    HCT 43.4 01/15/2018 11:40 AM    PLATELET 037 22/35/0001 11:40 AM    MCV 92.9 01/15/2018 11:40 AM     ASSESSMENT and PLAN    ICD-10-CM ICD-9-CM    1. Abdominal discomfort R10.9 789.00 CBC WITH AUTOMATED DIFF      METABOLIC PANEL, COMPREHENSIVE      CT ABD PELV W WO CONT   2. History of colon cancer Z85.038 V10.05 CT ABD PELV W WO CONT   3.  HX: breast cancer Z85.3 V10.3 CT CHEST W CONT   4. SOB (shortness of breath) R06.02 786.05 CT CHEST W CONT   5. History of recurrent UTIs Z87.440 V13.02    6. Essential hypertension I10 401.9      Unclear etiology for her abd discomfort, bloating. She is concerned given h/o CA. Will request CT to further assess  Continue f/u with with urology  Repeat labs and continue current medication for now. Discussed resuming PT in the future for hip pain/gait.

## 2018-09-05 NOTE — PATIENT INSTRUCTIONS

## 2018-10-04 ENCOUNTER — HOSPITAL ENCOUNTER (OUTPATIENT)
Dept: CT IMAGING | Age: 83
Discharge: HOME | End: 2018-10-04
Attending: INTERNAL MEDICINE
Payer: MEDICARE

## 2018-10-04 DIAGNOSIS — Z85.3 HX: BREAST CANCER: ICD-10-CM

## 2018-10-04 DIAGNOSIS — Z85.038 HISTORY OF COLON CANCER: ICD-10-CM

## 2018-10-04 DIAGNOSIS — R06.02 SOB (SHORTNESS OF BREATH): ICD-10-CM

## 2018-10-04 DIAGNOSIS — R10.9 ABDOMINAL DISCOMFORT: ICD-10-CM

## 2018-10-04 LAB — CREAT UR-MCNC: 0.9 MG/DL (ref 0.6–1.3)

## 2018-10-04 PROCEDURE — 74177 CT ABD & PELVIS W/CONTRAST: CPT

## 2018-10-04 PROCEDURE — 82565 ASSAY OF CREATININE: CPT

## 2018-10-04 PROCEDURE — 74011636320 HC RX REV CODE- 636/320: Performed by: INTERNAL MEDICINE

## 2018-10-04 RX ADMIN — IOPAMIDOL 80 ML: 612 INJECTION, SOLUTION INTRAVENOUS at 11:00

## 2018-10-10 ENCOUNTER — TELEPHONE (OUTPATIENT)
Dept: INTERNAL MEDICINE CLINIC | Age: 83
End: 2018-10-10

## 2018-10-10 NOTE — TELEPHONE ENCOUNTER
Patient's daughter called to request patients most recent CT imaging results.         Please advise.

## 2018-10-11 NOTE — TELEPHONE ENCOUNTER
Please let her know her CT did show some interstitial fibrosis which can explain shortness of breath. CT Results (most recent):    Results from Hospital Encounter encounter on 10/04/18   CT CHEST ABD PELV W CONT   Narrative EXAM: CT scan of the chest abdomen and pelvis with contrast.    CLINICAL HISTORY/INDICATION: Abdominal discomfort in a patient with history of  colon cancer. COMPARISON: None. TECHNIQUE: All CT scans at this facility are performed using dose optimization  technique as appropriate to a performed exam, to include automated exposure  control, adjustment of the mA and/or kV according to patient's size (including  appropriate matching for site-specific examinations), or use of iterative  reconstruction technique. [facility] FINDINGS:    Chest: A CT scan of the patient's chest is performed. Multiple axial images are  obtained from the thoracic inlet to the costophrenic angles. Intravenous  contrast material was utilized. There is normal opacification of the vascular  structures of the mediastinum. Extensive atherosclerotic plaquing is seen in the  thoracic aorta but no aneurysm or dissection is identified. No filling defects  are seen in the main pulmonary artery, the right pulmonary artery or the left  pulmonary artery. Coronary artery calcifications are noted. Lung window images  shows evidence for interstitial fibrosis bilaterally, emphysematous changes  bilaterally and some pleural thickening. No pulmonary nodules, infiltrates,  pleural effusions or pneumothoraces are seen. Bone window images shows extensive  degenerative changes throughout the thoracic spine. 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 No destructive bony lesion is  identified. Impression IMPRESSION: Bilateral interstitial fibrosis with bilateral emphysematous  changes. No evidence for a pulmonary nodule, infiltrate, pleural effusion or  pneumothorax.   No evidence for mediastinal or hilar adenopathy. Abdomen and pelvis: A CT scan of the patient's abdomen and pelvis is performed. Oral and intravenous contrast material utilized. No evidence for a pericardial  effusion is seen. The attenuation coefficients of the liver, spleen, kidneys and  pancreas appears uniform. Adrenal glands are seen bilaterally and appear normal.  No renal calculi or obstructive uropathy is present. Within the lumen of the  gallbladder are multiple high attenuation filling defects compatible with  gallstones. Noted on image #85 of series 3 is the appearance of a lucent septum  traversing a portion of the aortic lumen. This appears to represent a old  dissection. The dissection does not appear to continue into the iliac arteries. The appendix is not identified and is probably surgically absent. . No dilated  loops of bowel, free fluid or free air seen in the peritoneum. Within the pelvis  the uterus is surgically absent. A severe degenerative spondylosis is seen in  the lumbar spine. .    IMPRESSION:    No renal calculi or obstructive uropathy. Cholelithiasis without evidence for cholecystitis. No dilated loops of bowel, free fluid or free air in the peritoneum. The appendix and uterus are surgically absent. Severe degenerative changes noted in the lumbar spine. 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

## 2018-10-12 NOTE — TELEPHONE ENCOUNTER
Please return call to daughter regarding her mothers results of her CT scan please return call when available

## 2018-10-12 NOTE — TELEPHONE ENCOUNTER
Pt daughter contacted. 2 pt identifiers confirmed. Pt daughter informed of below per Dr Dorothey Phalen. Pt daughter verbalized understanding. No other questions or concerns at this time.

## 2018-12-03 ENCOUNTER — OFFICE VISIT (OUTPATIENT)
Dept: INTERNAL MEDICINE CLINIC | Age: 83
End: 2018-12-03

## 2018-12-03 VITALS
HEIGHT: 56 IN | HEART RATE: 46 BPM | WEIGHT: 133.4 LBS | TEMPERATURE: 97.7 F | RESPIRATION RATE: 20 BRPM | BODY MASS INDEX: 30.01 KG/M2 | OXYGEN SATURATION: 96 % | DIASTOLIC BLOOD PRESSURE: 36 MMHG | SYSTOLIC BLOOD PRESSURE: 139 MMHG

## 2018-12-03 DIAGNOSIS — H81.90 VESTIBULAR DIZZINESS: Primary | ICD-10-CM

## 2018-12-03 DIAGNOSIS — R00.1 BRADYCARDIA: ICD-10-CM

## 2018-12-03 RX ORDER — ROSUVASTATIN CALCIUM 10 MG/1
10 TABLET, COATED ORAL
COMMUNITY
Start: 2018-11-07

## 2018-12-03 RX ORDER — CONJUGATED ESTROGENS 0.62 MG/G
CREAM VAGINAL
Qty: 30 G | Refills: 3 | Status: SHIPPED | OUTPATIENT
Start: 2018-12-03 | End: 2020-02-07 | Stop reason: SDUPTHER

## 2018-12-03 NOTE — PROGRESS NOTES
HISTORY OF PRESENT ILLNESS  Kayley Perez is a 80 y.o. female. 79 yo female here for evaluation of dizziness x 4 days. Was shopping with her daughter when she felt onset of dizziness and needed to sit down. Thought sx were due to not eating and seemed to improve some with meal. Sx intermittently recur. Caused by certain movement/position changes. Describes as room spinning intermittently but feels 'fuzzy' all of the time. Feels her vision seems a little blurred. Denies nausea, vomiting. No fevers, chills, HA. Review of Systems   Constitutional: Negative for chills, fever and weight loss. HENT: Negative for congestion and ear pain. Eyes: Positive for blurred vision. Negative for pain. Respiratory: Negative for cough and shortness of breath. Cardiovascular: Negative for chest pain, palpitations and leg swelling. Gastrointestinal: Negative for nausea and vomiting. Genitourinary: Negative for frequency and urgency. Musculoskeletal: Positive for back pain. Negative for joint pain and myalgias. Skin: Negative for itching and rash. Neurological: Positive for dizziness. Negative for tingling and headaches. Psychiatric/Behavioral: Negative for depression. The patient is not nervous/anxious. Past Medical History:   Diagnosis Date    Arthritis     CAD (coronary artery disease)     Cancer ([facility] [address]     breast and colon    GERD (gastroesophageal reflux disease)     High cholesterol     Hypertension     Urinary incontinence      Current Outpatient Medications on File Prior to Visit   Medication Sig Dispense Refill    rosuvastatin (CRESTOR) 10 mg tablet 10 mg.      meloxicam (MOBIC) 7.5 mg tablet Take 1 Tab by mouth daily. 90 Tab 1    CRANBERRY FRUIT EXTRACT (CRANBERRY PO) Take 300 mg by mouth.  cyclobenzaprine (FLEXERIL) 5 mg tablet Take 1 Tab by mouth three (3) times daily as needed for Muscle Spasm(s).  30 Tab 2    clobetasol 0.05 % spry APPLY TO AFFECTED AREA EVERY NIGHTAT BEDTIME FOR 2 WEEKS THEN AS NEEDED TO SCALP  2    nitroglycerin (NITROSTAT) 0.4 mg SL tablet One tab sublingual every 5 min for relief of pain x 3 as necessary. Report to ER if pain lasts longer than 30 min      VIT C/VIT E AC/LUT/COPPER/ZINC (PRESERVISION LUTEIN PO) Take  by mouth.  albuterol (PROVENTIL HFA, VENTOLIN HFA, PROAIR HFA) 90 mcg/actuation inhaler Take 2 Puffs by inhalation every six (6) hours as needed for Wheezing. 1 Inhaler 3    inhalational spacing device Use as directed with inhaler 1 Device 0    DYMISTA 137-50 mcg/spray spry INSTILL 1 SPRAY TO EACH NOSTRIL TWICE A DAY  12    trospium (SANCTURA XL) 60 mg capsule Take 60 mg by mouth Daily (before breakfast).  carvedilol (COREG) 3.125 mg tablet Take 3.125 mg by mouth daily. 0    aspirin delayed-release 81 mg tablet Take 81 mg by mouth daily. 2 tabs      LACTOBACILLUS ACIDOPHILUS (PROBIOTIC PO) Take  by mouth daily.  ketoconazole (NIZORAL) 2 % shampoo        No current facility-administered medications on file prior to visit. Social History     Tobacco Use    Smoking status: Former Smoker     Types: Cigarettes     Last attempt to quit: 1983     Years since quittin.3    Smokeless tobacco: Never Used   Substance Use Topics    Alcohol use: Yes     Comment: rare    Drug use: No     Physical Exam   Constitutional: She appears well-developed and well-nourished. No distress. BP (!) 139/36 (BP 1 Location: Left arm, BP Patient Position: Sitting)   Pulse (!) 46   Temp 97.7 °F (36.5 °C) (Oral)   Resp 20   Ht 4' 8\" (1.422 m)   Wt 133 lb 6.4 oz (60.5 kg)   SpO2 96%   BMI 29.91 kg/m²      Eyes: EOM are normal. Pupils are equal, round, and reactive to light. Right eye exhibits no discharge. Left eye exhibits no discharge. No scleral icterus. Right eye exhibits no nystagmus. Left eye exhibits no nystagmus. Neck: Neck supple. Cardiovascular: Normal rate, regular rhythm and normal heart sounds.  Exam reveals no gallop and no friction rub. No murmur heard. Pulmonary/Chest: Effort normal and breath sounds normal. No respiratory distress. She has no wheezes. She has no rales. Musculoskeletal: She exhibits no edema or tenderness. Lymphadenopathy:     She has no cervical adenopathy. Neurological: She is alert. She exhibits normal muscle tone. Skin: Skin is warm and dry. Psychiatric: She has a normal mood and affect. Dizziness with going from sitting to supine, but no nystagmus appreciated. Lab Results   Component Value Date/Time    Sodium 142 09/05/2018 11:34 AM    Potassium 5.0 09/05/2018 11:34 AM    Chloride 104 09/05/2018 11:34 AM    CO2 30 09/05/2018 11:34 AM    Anion gap 8 09/05/2018 11:34 AM    Glucose 184 (H) 09/05/2018 11:34 AM    BUN 24 (H) 09/05/2018 11:34 AM    Creatinine 0.93 09/05/2018 11:34 AM    BUN/Creatinine ratio 26 (H) 09/05/2018 11:34 AM    GFR est AA >60 09/05/2018 11:34 AM    GFR est non-AA 56 (L) 09/05/2018 11:34 AM    Calcium 9.9 09/05/2018 11:34 AM    Bilirubin, total 0.7 09/05/2018 11:34 AM    AST (SGOT) 18 09/05/2018 11:34 AM    Alk. phosphatase 78 09/05/2018 11:34 AM    Protein, total 7.1 09/05/2018 11:34 AM    Albumin 3.7 09/05/2018 11:34 AM    Globulin 3.4 09/05/2018 11:34 AM    A-G Ratio 1.1 09/05/2018 11:34 AM    ALT (SGPT) 19 09/05/2018 11:34 AM     Lab Results   Component Value Date/Time    WBC 8.4 09/05/2018 11:34 AM    HGB 14.8 09/05/2018 11:34 AM    HCT 44.7 09/05/2018 11:34 AM    PLATELET 144 48/50/0169 11:34 AM    MCV 92.2 09/05/2018 11:34 AM     ASSESSMENT and PLAN    ICD-10-CM ICD-9-CM    1. Vestibular dizziness R42 386.9 CT HEAD WO CONT   2. Bradycardia R00.1 427.89      Suspect BPPV. Provided info on AVS regarding this. Will try meclizine q 8 hours. Hold Coreg for now and monitor BP until sx resolve. CT ordered as she has difficulty stating if sx are intermittent or if she is 'fuzzy' since sx started 4 days ago.

## 2018-12-03 NOTE — PATIENT INSTRUCTIONS
Epley Maneuver at Home for Vertigo: Exercises  Your Care Instructions  Vertigo is a spinning or whirling sensation when you move your head. Your doctor may have moved you in different positions to help your vertigo get better faster. This is called the Epley maneuver. Your doctor also may have asked you to do these exercises at home. Do the exercises as often as your doctor recommends. If your vertigo is getting worse, your doctor may have you change the exercise or stop it. Step 1  Step 1    1. Sit on the edge of a bed or sofa. Step 2    1. Turn your head 45 degrees in the direction your doctor told you to. This should be toward the ear that causes the most vertigo for you. In this picture, the woman is turning toward her left ear. Step 3    1. Tilt yourself backward until you are lying on your back. Your head should still be at a 45-degree turn. Your head should be about midway between looking straight ahead and looking out to your side. Hold for 30 seconds. If you have vertigo, stay in this position until it stops. Step 4    1. Turn your head 90 degrees toward the ear that has the least vertigo. In this picture, the woman is turning to the right because she has vertigo on her left side. The point of your chin should be raised and over your shoulder. Hold for 30 seconds. Step 5    1. Roll onto the side with the least vertigo. You should now be looking at the floor. Hold for 30 seconds. Follow-up care is a key part of your treatment and safety. Be sure to make and go to all appointments, and call your doctor if you are having problems. It's also a good idea to know your test results and keep a list of the medicines you take. Where can you learn more? Go to http://radha-abdiaziz.info/. Enter 470 [id number] in the search box to learn more about \"Epley Maneuver at Home for Vertigo: Exercises. \"  Current as of: June 4, 2018  Content Version: 11.8  © 0617-5570 [employer]. Care instructions adapted under license by [employer] (which disclaims liability or warranty for this information). If you have questions about a medical condition or this instruction, always ask your healthcare professional. [address] any warranty or liability for your use of this information. Vertigo: Care Instructions  Your Care Instructions    Vertigo is the feeling that you or your surroundings are moving when there is no actual movement. It is often described as a feeling of spinning, whirling, falling, or tilting. Vertigo may make you vomit or feel nauseated. You may have trouble standing or walking and may lose your balance. Vertigo is often related to an inner ear problem, but it can have other more serious causes. If vertigo continues, you may need more tests to find its cause. Follow-up care is a key part of your treatment and safety. Be sure to make and go to all appointments, and call your doctor if you are having problems. It's also a good idea to know your test results and keep a list of the medicines you take. How can you care for yourself at home? · Do not lie flat on your back. Prop yourself up slightly. This may reduce the spinning feeling. Keep your eyes open. · Move slowly so that you do not fall. · If your doctor recommends medicine, take it exactly as directed. · Do not drive while you are having vertigo. Certain exercises, called Caputo-Daroff exercises, can help decrease vertigo. To do Caputo-Daroff exercises:  · Sit on the edge of a bed or sofa and quickly lie down on the side that causes the worst vertigo. Lie on your side with your ear down. · Stay in this position for at least 30 seconds or until the vertigo goes away. · Sit up. If this causes vertigo, wait for it to stop. · Repeat the procedure on the other side. · Repeat this 10 times. Do these exercises 2 times a day until the vertigo is gone.   When should you call for help?  Call 911 anytime you think you may need emergency care. For example, call if:    · You passed out (lost consciousness).     · You have symptoms of a stroke. These may include:  ? Sudden numbness, tingling, weakness, or loss of movement in your face, arm, or leg, especially on only one side of your body. ? Sudden vision changes. ? Sudden trouble speaking. ? Sudden confusion or trouble understanding simple statements. ? Sudden problems with walking or balance. ? A sudden, severe headache that is different from past headaches.    Call your doctor now or seek immediate medical care if:    · Vertigo occurs with a fever, a headache, or ringing in your ears.     · You have new or increased nausea and vomiting.    Watch closely for changes in your health, and be sure to contact your doctor if:    · Vertigo gets worse or happens more often.     · Vertigo has not gotten better after 2 weeks. Where can you learn more? Go to http://radha-abdiaziz.info/. Enter V542 in the search box to learn more about \"Vertigo: Care Instructions. \"  Current as of: March 28, 2018  Content Version: 11.8  © 4911-3267 [employer]. Care instructions adapted under license by [employer] (which disclaims liability or warranty for this information). If you have questions about a medical condition or this instruction, always ask your healthcare professional. [address] any warranty or liability for your use of this information.

## 2018-12-03 NOTE — PROGRESS NOTES
ROOM # [id number]  Identified pt with two pt identifiers(name and ). Reviewed record in preparation for visit and have obtained necessary documentation. Chief Complaint   Patient presents with    Dizziness      Irma Ugarte preferred language for health care discussion is english/other. Is the patient using any DME equipment during OV? Radha Gomez is due for:  Health Maintenance Due   Topic    DTaP/Tdap/Td series (1 - Tdap)    Shingrix Vaccine Age 50> (1 of 2)    GLAUCOMA SCREENING Q2Y     MEDICARE YEARLY EXAM     Influenza Age 5 to Adult      Health Maintenance reviewed and discussed per provider  Please order/place referral if appropriate. Advance Directive:  1. Do you have an advance directive in place? Patient Reply: NO    2. If not, would you like material regarding how to put one in place? NO    Coordination of Care:  1. Have you been to the ER, urgent care clinic since your last visit? Hospitalized since your last visit? NO    2. Have you seen or consulted any other health care providers outside of the [address] [place] [address] since your last visit? Include any pap smears or colon screening. NO    Patient is accompanied by daughter I have received verbal consent from Irma Ugarte to discuss any/all medical information while they are present in the room.     Learning Assessment:  Learning Assessment 3/16/2018   PRIMARY LEARNER Patient   BARRIERS PRIMARY LEARNER HEARING   CO-LEARNER CAREGIVER No   PRIMARY LANGUAGE ENGLISH   LEARNER PREFERENCE PRIMARY DEMONSTRATION   ANSWERED BY patient   RELATIONSHIP SELF     Depression Screening:  PHQ over the last two weeks 12/3/2018 2018 2018 3/16/2018 10/23/2017 2017 2016   Little interest or pleasure in doing things Not at all Not at all Not at all Not at all Several days Not at all More than half the days   Feeling down, depressed, irritable, or hopeless Not at all Not at all Not at all Not at all Several days Not at all Not at all   Total Score PHQ 2 0 0 0 0 2 0 2     Abuse Screening:  Abuse Screening Questionnaire 10/23/2017   Do you ever feel afraid of your partner? N   Are you in a relationship with someone who physically or mentally threatens you? N   Is it safe for you to go home? Y     Fall Risk  Fall Risk Assessment, last 12 mths 12/3/2018 9/5/2018 6/21/2018 3/16/2018 1/15/2018 12/4/2017 10/23/2017   Able to walk? Yes Yes Yes Yes Yes Yes Yes   Fall in past 12 months? Yes No No No No Yes Yes   Fall with injury?  No - - - - - Yes   Number of falls in past 12 months 1 - - - - 3 1   Fall Risk Score 1 - - - - - 2

## 2018-12-26 ENCOUNTER — OFFICE VISIT (OUTPATIENT)
Dept: INTERNAL MEDICINE CLINIC | Age: 83
End: 2018-12-26

## 2018-12-26 VITALS
SYSTOLIC BLOOD PRESSURE: 125 MMHG | TEMPERATURE: 97.7 F | BODY MASS INDEX: 29.47 KG/M2 | HEIGHT: 56 IN | OXYGEN SATURATION: 97 % | RESPIRATION RATE: 16 BRPM | HEART RATE: 78 BPM | DIASTOLIC BLOOD PRESSURE: 55 MMHG | WEIGHT: 131 LBS

## 2018-12-26 DIAGNOSIS — Z95.0 H/O CARDIAC PACEMAKER: Primary | ICD-10-CM

## 2018-12-26 DIAGNOSIS — R42 DIZZINESS: ICD-10-CM

## 2018-12-26 RX ORDER — AMLODIPINE BESYLATE 10 MG/1
10 TABLET ORAL DAILY
COMMUNITY
Start: 2018-12-13 | End: 2022-06-12 | Stop reason: DRUGHIGH

## 2018-12-26 RX ORDER — LOSARTAN POTASSIUM 50 MG/1
25 TABLET ORAL DAILY
COMMUNITY
Start: 2018-12-12 | End: 2020-11-03 | Stop reason: SDUPTHER

## 2018-12-26 RX ORDER — CHLORPHENIRAMINE MALEATE 4 MG
TABLET ORAL
COMMUNITY
Start: 2018-12-11 | End: 2018-12-26

## 2018-12-26 RX ORDER — SOTALOL HYDROCHLORIDE 80 MG/1
40 TABLET ORAL DAILY
COMMUNITY
Start: 2018-12-12

## 2018-12-26 RX ORDER — AMOXICILLIN AND CLAVULANATE POTASSIUM 500; 125 MG/1; MG/1
TABLET, FILM COATED ORAL
COMMUNITY
Start: 2018-12-12 | End: 2019-04-16

## 2018-12-26 NOTE — PATIENT INSTRUCTIONS
Dizziness: Care Instructions  Your Care Instructions  Dizziness is the feeling of unsteadiness or fuzziness in your head. It is different than having vertigo, which is a feeling that the room is spinning or that you are moving or falling. It is also different from lightheadedness, which is the feeling that you are about to faint. It can be hard to know what causes dizziness. Some people feel dizzy when they have migraine headaches. Sometimes bouts of flu can make you feel dizzy. Some medical conditions, such as heart problems or high blood pressure, can make you feel dizzy. Many medicines can cause dizziness, including medicines for high blood pressure, pain, or anxiety. If a medicine causes your symptoms, your doctor may recommend that you stop or change the medicine. If it is a problem with your heart, you may need medicine to help your heart work better. If there is no clear reason for your symptoms, your doctor may suggest watching and waiting for a while to see if the dizziness goes away on its own. Follow-up care is a key part of your treatment and safety. Be sure to make and go to all appointments, and call your doctor if you are having problems. It's also a good idea to know your test results and keep a list of the medicines you take. How can you care for yourself at home? · If your doctor recommends or prescribes medicine, take it exactly as directed. Call your doctor if you think you are having a problem with your medicine. · Do not drive while you feel dizzy. · Try to prevent falls. Steps you can take include:  ? Using nonskid mats, adding grab bars near the tub, and using night-lights. ? Clearing your home so that walkways are free of anything you might trip on.  ? Letting family and friends know that you have been feeling dizzy. This will help them know how to help you. When should you call for help? Call 911 anytime you think you may need emergency care.  For example, call if:    · You passed out (lost consciousness).     · You have dizziness along with symptoms of a heart attack. These may include:  ? Chest pain or pressure, or a strange feeling in the chest.  ? Sweating. ? Shortness of breath. ? Nausea or vomiting. ? Pain, pressure, or a strange feeling in the back, neck, jaw, or upper belly or in one or both shoulders or arms. ? Lightheadedness or sudden weakness. ? A fast or irregular heartbeat.     · You have symptoms of a stroke. These may include:  ? Sudden numbness, tingling, weakness, or loss of movement in your face, arm, or leg, especially on only one side of your body. ? Sudden vision changes. ? Sudden trouble speaking. ? Sudden confusion or trouble understanding simple statements. ? Sudden problems with walking or balance. ? A sudden, severe headache that is different from past headaches.    Call your doctor now or seek immediate medical care if:    · You feel dizzy and have a fever, headache, or ringing in your ears.     · You have new or increased nausea and vomiting.     · Your dizziness does not go away or comes back.    Watch closely for changes in your health, and be sure to contact your doctor if:    · You do not get better as expected. Where can you learn more? Go to http://radha-abdiaziz.info/. Enter S439 in the search box to learn more about \"Dizziness: Care Instructions. \"  Current as of: November 20, 2017  Content Version: 11.8  © 1450-6684 [employer]. Care instructions adapted under license by [employer] (which disclaims liability or warranty for this information). If you have questions about a medical condition or this instruction, always ask your healthcare professional. [place] [address] any warranty or liability for your use of this information.

## 2018-12-26 NOTE — PROGRESS NOTES
HISTORY OF PRESENT ILLNESS  Dane Lujan is a 80 y.o. female. 81 yo female here for f/u from recent hospital and rehab stays due to pacemaker placement. She had presented to ED after worsening of her dizziness. Initial EKG normal. While on telemetry her daughter (CRNA) noticed that she was in heart block incidentally. Was transferred to [facility] for pacemaker placement. Had some difficulty waking from anesthesia and required pressor support for hypotension. Daughter reports she had some hypoxia during this event with some confusion that was improving the following day. Pt notes she still feels a little fuzzy headed since this occurred. Transferred to  for brief inpatient therapy. Now with home health PT/OT. Has been staying with daughter; will be moving back to her apt this week. Notes increased back pain since meloxicam discontinued due to potential drug interactions with ARB, eliquis. Family member (pharmacist) wondering if she can try CBD oil. Other medication changes include discontinuation of coreg and initiation of sotalol. Review of Systems   Constitutional: Positive for malaise/fatigue. Negative for chills, fever and weight loss. HENT: Negative for congestion and ear pain. Eyes: Negative for blurred vision and pain. Respiratory: Negative for cough and shortness of breath. Cardiovascular: Negative for chest pain, palpitations and leg swelling. Gastrointestinal: Negative for nausea and vomiting. Genitourinary: Negative for frequency and urgency. Musculoskeletal: Positive for back pain. Negative for joint pain and myalgias. Skin: Negative for itching and rash. Neurological: Positive for dizziness (intermittent). Negative for tingling and headaches. Psychiatric/Behavioral: Negative for depression. The patient is not nervous/anxious.       Past Medical History:   Diagnosis Date    Arthritis     CAD (coronary artery disease)     Cancer ([address]     breast and colon    GERD (gastroesophageal reflux disease)     High cholesterol     Hypertension     Urinary incontinence      Current Outpatient Medications on File Prior to Visit   Medication Sig Dispense Refill    amLODIPine (NORVASC) 10 mg tablet 10 mg.      amoxicillin-clavulanate (AUGMENTIN) 500-125 mg per tablet Take 1 Tab by Mouth Every 12 hours. Take with food or milk. Resume as prior for UTI prophylaxis as per UroGYN--was taking as outpt      apixaban (ELIQUIS) 2.5 mg tablet 2.5 mg.      clotrimazole (LOTRIMIN) 1 % topical cream Use 1 Application to affected area Twice Daily. Indications: ringworm of the body      losartan (COZAAR) 50 mg tablet 25 mg.      sotalol (BETAPACE) 80 mg tablet 40 mg.      rosuvastatin (CRESTOR) 10 mg tablet 10 mg.      CRANBERRY FRUIT EXTRACT (CRANBERRY PO) Take 300 mg by mouth.  clobetasol 0.05 % spry APPLY TO AFFECTED AREA EVERY NIGHTAT BEDTIME FOR 2 WEEKS THEN AS NEEDED TO SCALP  2    nitroglycerin (NITROSTAT) 0.4 mg SL tablet One tab sublingual every 5 min for relief of pain x 3 as necessary. Report to ER if pain lasts longer than 30 min      ketoconazole (NIZORAL) 2 % shampoo       VIT C/VIT E AC/LUT/COPPER/ZINC (PRESERVISION LUTEIN PO) Take  by mouth.  DYMISTA 137-50 mcg/spray spry INSTILL 1 SPRAY TO EACH NOSTRIL TWICE A DAY  12    trospium (SANCTURA XL) 60 mg capsule Take 60 mg by mouth Daily (before breakfast).  LACTOBACILLUS ACIDOPHILUS (PROBIOTIC PO) Take  by mouth daily.  PREMARIN 0.625 mg/gram vaginal cream APPLY A SMALL AMOUNT TO AFFECTED AREA EVERY NIGHT AS DIRECTED 30 g 3    meloxicam (MOBIC) 7.5 mg tablet Take 1 Tab by mouth daily. 90 Tab 1    cyclobenzaprine (FLEXERIL) 5 mg tablet Take 1 Tab by mouth three (3) times daily as needed for Muscle Spasm(s). 30 Tab 2    albuterol (PROVENTIL HFA, VENTOLIN HFA, PROAIR HFA) 90 mcg/actuation inhaler Take 2 Puffs by inhalation every six (6) hours as needed for Wheezing.  1 Inhaler 3    inhalational spacing device Use as directed with inhaler 1 Device 0    carvedilol (COREG) 3.125 mg tablet Take 3.125 mg by mouth daily. 0    aspirin delayed-release 81 mg tablet Take 81 mg by mouth daily. 2 tabs       No current facility-administered medications on file prior to visit. Physical Exam   Constitutional: She appears well-developed and well-nourished. No distress. /55 (BP 1 Location: Left arm, BP Patient Position: Sitting)   Pulse 78   Temp 97.7 °F (36.5 °C) (Oral)   Resp 16   Ht 4' 8\" (1.422 m)   Wt 131 lb (59.4 kg)   SpO2 97%   BMI 29.37 kg/m²      Eyes: EOM are normal. Right eye exhibits no discharge. Left eye exhibits no discharge. No scleral icterus. Neck: Neck supple. Cardiovascular: Normal rate, regular rhythm and normal heart sounds. Exam reveals no gallop and no friction rub. No murmur heard. Pulmonary/Chest: Effort normal and breath sounds normal. No respiratory distress. She has no wheezes. She has no rales. Musculoskeletal: She exhibits no edema or tenderness. Lymphadenopathy:     She has no cervical adenopathy. Neurological: She is alert. She exhibits normal muscle tone. Skin: Skin is warm and dry. Psychiatric: She has a normal mood and affect. Lab Results   Component Value Date/Time    Sodium 142 09/05/2018 11:34 AM    Potassium 5.0 09/05/2018 11:34 AM    Chloride 104 09/05/2018 11:34 AM    CO2 30 09/05/2018 11:34 AM    Anion gap 8 09/05/2018 11:34 AM    Glucose 184 (H) 09/05/2018 11:34 AM    BUN 24 (H) 09/05/2018 11:34 AM    Creatinine 0.93 09/05/2018 11:34 AM    BUN/Creatinine ratio 26 (H) 09/05/2018 11:34 AM    GFR est AA >60 09/05/2018 11:34 AM    GFR est non-AA 56 (L) 09/05/2018 11:34 AM    Calcium 9.9 09/05/2018 11:34 AM    Bilirubin, total 0.7 09/05/2018 11:34 AM    AST (SGOT) 18 09/05/2018 11:34 AM    Alk.  phosphatase 78 09/05/2018 11:34 AM    Protein, total 7.1 09/05/2018 11:34 AM    Albumin 3.7 09/05/2018 11:34 AM    Globulin 3.4 09/05/2018 11:34 AM    A-G Ratio 1.1 09/05/2018 11:34 AM    ALT (SGPT) 19 09/05/2018 11:34 AM     Lab Results   Component Value Date/Time    WBC 8.4 09/05/2018 11:34 AM    HGB 14.8 09/05/2018 11:34 AM    HCT 44.7 09/05/2018 11:34 AM    PLATELET 377 96/65/7893 11:34 AM    MCV 92.2 09/05/2018 11:34 AM     ASSESSMENT and PLAN    ICD-10-CM ICD-9-CM    1. H/O cardiac pacemaker Z95.0 V12.50      V45.01    2. Dizziness R42 780.4 REFERRAL TO PHYSICAL THERAPY     Some improvement since pacer placement. Discussed that her fatigue, dizziness may be multifactorial. Discussed gradual positional changes. F/u with cardiology as planned. Continue with PT. Continue to hold meloxicam due to medication interaction potential. Can continue OTC APAP up to 3000mg daily. May try OTC CBD if she would like.

## 2018-12-26 NOTE — PROGRESS NOTES
ROOM # [id number]    [facility] presents today for   Chief Complaint   Patient presents with   [facility] Follow Up       [facility] preferred language for health care discussion is english/other. Is someone accompanying this pt? Yes daughter    Is the patient using any DME equipment during OV? rollator    Depression Screening:  PHQ over the last two weeks 12/26/2018 12/3/2018 9/5/2018 6/21/2018 3/16/2018 10/23/2017 7/25/2017   Little interest or pleasure in doing things Not at all Not at all Not at all Not at all Not at all Several days Not at all   Feeling down, depressed, irritable, or hopeless Several days Not at all Not at all Not at all Not at all Several days Not at all   Total Score PHQ 2 1 0 0 0 0 2 0       Learning Assessment:  Learning Assessment 3/16/2018   PRIMARY LEARNER Patient   BARRIERS PRIMARY LEARNER HEARING   CO-LEARNER CAREGIVER No   PRIMARY LANGUAGE ENGLISH   LEARNER PREFERENCE PRIMARY DEMONSTRATION   ANSWERED BY patient   RELATIONSHIP SELF       Abuse Screening:  Abuse Screening Questionnaire 10/23/2017   Do you ever feel afraid of your partner? N   Are you in a relationship with someone who physically or mentally threatens you? N   Is it safe for you to go home? Y       Fall Risk  Fall Risk Assessment, last 12 mths 12/26/2018 12/3/2018 9/5/2018 6/21/2018 3/16/2018 1/15/2018 12/4/2017   Able to walk? Yes Yes Yes Yes Yes Yes Yes   Fall in past 12 months? Yes Yes No No No No Yes   Fall with injury? No No - - - - -   Number of falls in past 12 months 1 1 - - - - 3   Fall Risk Score 1 1 - - - - -       Health Maintenance reviewed and discussed per provider.  Yes    [facility] is due for   Health Maintenance Due   Topic Date Due    DTaP/Tdap/Td series (1 - Tdap) 04/08/1947    Shingrix Vaccine Age 50> (1 of 2) 04/08/1976    GLAUCOMA SCREENING Q2Y  04/08/1991    MEDICARE YEARLY EXAM  07/26/2018    Influenza Age 9 to Adult  08/01/2018     Please order/place referral if appropriate. Advance Directive:  1. Do you have an advance directive in place? Patient Reply:     2. If not, would you like material regarding how to put one in place? Patient Reply:     Coordination of Care:  1. Have you been to the ER, urgent care clinic since your last visit? Hospitalized since your last visit? yes    2. Have you seen or consulted any other health care providers outside of the [address] [place] [address] since your last visit? Include any pap smears or colon screening.  no

## 2019-01-11 ENCOUNTER — TELEPHONE (OUTPATIENT)
Dept: INTERNAL MEDICINE CLINIC | Age: 84
End: 2019-01-11

## 2019-01-11 NOTE — TELEPHONE ENCOUNTER
Returned call to patient's daughter, Bismakr Haque. Informed Jaelyn per provider that the Mobic and Celebrex is a class C interaction. Not as risky as Eliquis and Mobic so just requires the provider to monitor therapy. Daughter verbalized understanding.

## 2019-01-11 NOTE — TELEPHONE ENCOUNTER
Pt.'s daughter is calling in for advice. States pt was recently taken off Mobic because she is on Eliquis. In place of Mobic was put on Celebrex. Wants to know if these two meds are ok to use together. Please advise.

## 2019-03-11 ENCOUNTER — HOSPITAL ENCOUNTER (OUTPATIENT)
Dept: LAB | Age: 84
Discharge: HOME | End: 2019-03-11
Payer: MEDICARE

## 2019-03-11 ENCOUNTER — OFFICE VISIT (OUTPATIENT)
Dept: INTERNAL MEDICINE CLINIC | Age: 84
End: 2019-03-11

## 2019-03-11 VITALS
OXYGEN SATURATION: 92 % | TEMPERATURE: 95.6 F | RESPIRATION RATE: 18 BRPM | WEIGHT: 134 LBS | DIASTOLIC BLOOD PRESSURE: 52 MMHG | HEART RATE: 62 BPM | BODY MASS INDEX: 30.14 KG/M2 | SYSTOLIC BLOOD PRESSURE: 135 MMHG | HEIGHT: 56 IN

## 2019-03-11 DIAGNOSIS — E78.49 OTHER HYPERLIPIDEMIA: ICD-10-CM

## 2019-03-11 DIAGNOSIS — I10 ESSENTIAL HYPERTENSION: ICD-10-CM

## 2019-03-11 DIAGNOSIS — L98.9 SKIN LESION OF FACE: ICD-10-CM

## 2019-03-11 DIAGNOSIS — R15.1 FECAL SMEARING: ICD-10-CM

## 2019-03-11 DIAGNOSIS — I10 ESSENTIAL HYPERTENSION: Primary | ICD-10-CM

## 2019-03-11 DIAGNOSIS — M48.061 SPINAL STENOSIS OF LUMBAR REGION WITHOUT NEUROGENIC CLAUDICATION: ICD-10-CM

## 2019-03-11 DIAGNOSIS — N39.0 RECURRENT UTI: ICD-10-CM

## 2019-03-11 LAB
ALBUMIN SERPL-MCNC: 3.9 G/DL (ref 3.4–5)
ALBUMIN/GLOB SERPL: 1.4 {RATIO} (ref 0.8–1.7)
ALP SERPL-CCNC: 73 U/L (ref 45–117)
ALT SERPL-CCNC: 22 U/L (ref 13–56)
ANION GAP SERPL CALC-SCNC: 8 MMOL/L (ref 3–18)
AST SERPL-CCNC: 19 U/L (ref 15–37)
BASOPHILS # BLD: 0 K/UL (ref 0–0.1)
BASOPHILS NFR BLD: 1 % (ref 0–2)
BILIRUB SERPL-MCNC: 0.6 MG/DL (ref 0.2–1)
BUN SERPL-MCNC: 23 MG/DL (ref 7–18)
BUN/CREAT SERPL: 25 (ref 12–20)
CALCIUM SERPL-MCNC: 9.3 MG/DL (ref 8.5–10.1)
CHLORIDE SERPL-SCNC: 106 MMOL/L (ref 100–108)
CHOLEST SERPL-MCNC: 135 MG/DL
CO2 SERPL-SCNC: 28 MMOL/L (ref 21–32)
CREAT SERPL-MCNC: 0.93 MG/DL (ref 0.6–1.3)
DIFFERENTIAL METHOD BLD: ABNORMAL
EOSINOPHIL # BLD: 0.3 K/UL (ref 0–0.4)
EOSINOPHIL NFR BLD: 3 % (ref 0–5)
ERYTHROCYTE [DISTWIDTH] IN BLOOD BY AUTOMATED COUNT: 13.6 % (ref 11.6–14.5)
GLOBULIN SER CALC-MCNC: 2.8 G/DL (ref 2–4)
GLUCOSE SERPL-MCNC: 154 MG/DL (ref 74–99)
HCT VFR BLD AUTO: 45.8 % (ref 35–45)
HDLC SERPL-MCNC: 52 MG/DL (ref 40–60)
HDLC SERPL: 2.6 {RATIO} (ref 0–5)
HGB BLD-MCNC: 14.5 G/DL (ref 12–16)
LDLC SERPL CALC-MCNC: 46.8 MG/DL (ref 0–100)
LIPID PROFILE,FLP: ABNORMAL
LYMPHOCYTES # BLD: 1.5 K/UL (ref 0.9–3.6)
LYMPHOCYTES NFR BLD: 20 % (ref 21–52)
MCH RBC QN AUTO: 30 PG (ref 24–34)
MCHC RBC AUTO-ENTMCNC: 31.7 G/DL (ref 31–37)
MCV RBC AUTO: 94.6 FL (ref 74–97)
MONOCYTES # BLD: 0.6 K/UL (ref 0.05–1.2)
MONOCYTES NFR BLD: 8 % (ref 3–10)
NEUTS SEG # BLD: 5 K/UL (ref 1.8–8)
NEUTS SEG NFR BLD: 68 % (ref 40–73)
PLATELET # BLD AUTO: 127 K/UL (ref 135–420)
PMV BLD AUTO: 12.8 FL (ref 9.2–11.8)
POTASSIUM SERPL-SCNC: 4.7 MMOL/L (ref 3.5–5.5)
PROT SERPL-MCNC: 6.7 G/DL (ref 6.4–8.2)
RBC # BLD AUTO: 4.84 M/UL (ref 4.2–5.3)
SODIUM SERPL-SCNC: 142 MMOL/L (ref 136–145)
TRIGL SERPL-MCNC: 181 MG/DL (ref ?–150)
TSH SERPL DL<=0.05 MIU/L-ACNC: 1.8 UIU/ML (ref 0.36–3.74)
VLDLC SERPL CALC-MCNC: 36.2 MG/DL
WBC # BLD AUTO: 7.4 K/UL (ref 4.6–13.2)

## 2019-03-11 PROCEDURE — 84443 ASSAY THYROID STIM HORMONE: CPT

## 2019-03-11 PROCEDURE — 85025 COMPLETE CBC W/AUTO DIFF WBC: CPT

## 2019-03-11 PROCEDURE — 80053 COMPREHEN METABOLIC PANEL: CPT

## 2019-03-11 PROCEDURE — 80061 LIPID PANEL: CPT

## 2019-03-11 RX ORDER — TRIAMCINOLONE ACETONIDE 1 MG/G
CREAM TOPICAL 2 TIMES DAILY
Qty: 15 G | Refills: 0 | Status: SHIPPED | OUTPATIENT
Start: 2019-03-11 | End: 2019-03-25

## 2019-03-11 RX ORDER — CELECOXIB 100 MG/1
100 CAPSULE ORAL DAILY
COMMUNITY
End: 2020-01-30 | Stop reason: ALTCHOICE

## 2019-03-11 NOTE — PROGRESS NOTES
HISTORY OF PRESENT ILLNESS  Brittany Wilson is a 80 y.o. female. 79 yo female here for f/u of HLD, back pain, HTN. Feeling better since pacer. After visit, daughter notes cognition has declined since this. Some irritation R side of nose  Fecal incontinence. Some leakage that she is not aware of at times. Wears pads for UI. H/o colon CA with resection, ? XRT  BP has been stable. Increased back pain since switch to celebrex from mobic due to interaction with Eliquis. HLD: on crestor per cardiology      Review of Systems   Constitutional: Negative for chills, fever and weight loss. HENT: Negative for congestion and ear pain. Eyes: Negative for blurred vision and pain. Respiratory: Negative for cough and shortness of breath. Cardiovascular: Negative for chest pain, palpitations and leg swelling. Gastrointestinal: Negative for nausea and vomiting. Genitourinary: Positive for frequency. Negative for urgency. Musculoskeletal: Positive for back pain. Negative for joint pain and myalgias. Skin: Negative for itching and rash. Neurological: Negative for dizziness, tingling and headaches. Psychiatric/Behavioral: Negative for depression. The patient is not nervous/anxious. Past Medical History:   Diagnosis Date    Arthritis     CAD (coronary artery disease)     Cancer ([facility] [address]     breast and colon    GERD (gastroesophageal reflux disease)     High cholesterol     Hypertension     Urinary incontinence      Current Outpatient Medications on File Prior to Visit   Medication Sig Dispense Refill    amLODIPine (NORVASC) 10 mg tablet 10 mg.      amoxicillin-clavulanate (AUGMENTIN) 500-125 mg per tablet Take 1 Tab by Mouth Every 12 hours. Take with food or milk.   Resume as prior for UTI prophylaxis as per UroGYN--was taking as outpt      apixaban (ELIQUIS) 2.5 mg tablet 2.5 mg.      losartan (COZAAR) 50 mg tablet 25 mg.      sotalol (BETAPACE) 80 mg tablet 40 mg.      rosuvastatin (CRESTOR) 10 mg tablet 10 mg.      PREMARIN 0.625 mg/gram vaginal cream APPLY A SMALL AMOUNT TO AFFECTED AREA EVERY NIGHT AS DIRECTED 30 g 3    CRANBERRY FRUIT EXTRACT (CRANBERRY PO) Take 300 mg by mouth.  clobetasol 0.05 % spry APPLY TO AFFECTED AREA EVERY NIGHTAT BEDTIME FOR 2 WEEKS THEN AS NEEDED TO SCALP  2    nitroglycerin (NITROSTAT) 0.4 mg SL tablet One tab sublingual every 5 min for relief of pain x 3 as necessary. Report to ER if pain lasts longer than 30 min      ketoconazole (NIZORAL) 2 % shampoo       VIT C/VIT E AC/LUT/COPPER/ZINC (PRESERVISION LUTEIN PO) Take  by mouth.  albuterol (PROVENTIL HFA, VENTOLIN HFA, PROAIR HFA) 90 mcg/actuation inhaler Take 2 Puffs by inhalation every six (6) hours as needed for Wheezing. 1 Inhaler 3    inhalational spacing device Use as directed with inhaler 1 Device 0    DYMISTA 137-50 mcg/spray spry INSTILL 1 SPRAY TO EACH NOSTRIL TWICE A DAY  12    trospium (SANCTURA XL) 60 mg capsule Take 60 mg by mouth Daily (before breakfast).  LACTOBACILLUS ACIDOPHILUS (PROBIOTIC PO) Take  by mouth daily. No current facility-administered medications on file prior to visit. Social History     Tobacco Use    Smoking status: Former Smoker     Types: Cigarettes     Last attempt to quit: 1983     Years since quittin.6    Smokeless tobacco: Never Used   Substance Use Topics    Alcohol use: Yes     Comment: rare    Drug use: No     Physical Exam   Constitutional: She appears well-developed and well-nourished. No distress. /52 (BP 1 Location: Right arm, BP Patient Position: Sitting)   Pulse 62   Temp 95.6 °F (35.3 °C) (Oral)   Resp 18   Ht 4' 8\" (1.422 m)   Wt 134 lb (60.8 kg)   SpO2 92%   BMI 30.04 kg/m²      Eyes: EOM are normal. Right eye exhibits no discharge. Left eye exhibits no discharge. No scleral icterus. Neck: Neck supple. Cardiovascular: Normal rate, regular rhythm and normal heart sounds.  Exam reveals no gallop and no friction rub. No murmur heard. Pulmonary/Chest: Effort normal and breath sounds normal. No respiratory distress. She has no wheezes. She has no rales. Abdominal: Soft. She exhibits no distension. There is no tenderness. Musculoskeletal: She exhibits no edema or tenderness. Lymphadenopathy:     She has no cervical adenopathy. Neurological: She is alert. She exhibits normal muscle tone. Skin: Skin is warm and dry. ? Mild seborrhea dermatitis R nasal fold   Psychiatric: She has a normal mood and affect. Lab Results   Component Value Date/Time    Sodium 142 09/05/2018 11:34 AM    Potassium 5.0 09/05/2018 11:34 AM    Chloride 104 09/05/2018 11:34 AM    CO2 30 09/05/2018 11:34 AM    Anion gap 8 09/05/2018 11:34 AM    Glucose 184 (H) 09/05/2018 11:34 AM    BUN 24 (H) 09/05/2018 11:34 AM    Creatinine 0.93 09/05/2018 11:34 AM    BUN/Creatinine ratio 26 (H) 09/05/2018 11:34 AM    GFR est AA >60 09/05/2018 11:34 AM    GFR est non-AA 56 (L) 09/05/2018 11:34 AM    Calcium 9.9 09/05/2018 11:34 AM    Bilirubin, total 0.7 09/05/2018 11:34 AM    AST (SGOT) 18 09/05/2018 11:34 AM    Alk. phosphatase 78 09/05/2018 11:34 AM    Protein, total 7.1 09/05/2018 11:34 AM    Albumin 3.7 09/05/2018 11:34 AM    Globulin 3.4 09/05/2018 11:34 AM    A-G Ratio 1.1 09/05/2018 11:34 AM    ALT (SGPT) 19 09/05/2018 11:34 AM     Lab Results   Component Value Date/Time    Cholesterol, total 184 10/31/2017 11:04 AM    HDL Cholesterol 48 10/31/2017 11:04 AM    LDL, calculated 107.8 (H) 10/31/2017 11:04 AM    VLDL, calculated 28.2 10/31/2017 11:04 AM    Triglyceride 141 10/31/2017 11:04 AM    CHOL/HDL Ratio 3.8 10/31/2017 11:04 AM     ASSESSMENT and PLAN    ICD-10-CM ICD-9-CM    1. Essential hypertension I10 401.9 CBC WITH AUTOMATED DIFF      METABOLIC PANEL, COMPREHENSIVE      TSH 3RD GENERATION   2. Recurrent UTI N39.0 599.0    3. Spinal stenosis of lumbar region without neurogenic claudication M48.061 724.02    4.  Other hyperlipidemia E78.49 272.4 LIPID PANEL   5. Fecal smearing R15.1 787.62    6. Skin lesion of face L98.9 709.9      Will continue with current medication for now. Can try kenalog to skin lesion   Repeat labs today. Discussed metamucil/fiber to add bulk to stool. Further assess cognition next visit.

## 2019-03-11 NOTE — PATIENT INSTRUCTIONS
Diet for Fecal Incontinence: Care Instructions  Your Care Instructions    Fecal incontinence is the loss of normal control of your bowels. You may not be able to reach the toilet in time for a bowel movement. Or stool may leak from your anus. This problem can be caused by constipation or diarrhea. Anxiety or other emotional stress can be a cause too. It can also result from nerve injury, muscle damage (especially from childbirth), lack of exercise, or poor diet. What you eat can help you manage fecal incontinence. Which foods you eat or avoid may depend on why you have the problem. Follow-up care is a key part of your treatment and safety. Be sure to make and go to all appointments, and call your doctor if you are having problems. It's also a good idea to know your test results and keep a list of the medicines you take. How can you care for yourself at home? · Keep a food diary of what you eat. This can help you find out which foods may help or cause this problem. · Eat small, frequent meals. · If you and your doctor feel that constipation is part of the problem:  ? Include fruits, vegetables, beans, and whole grains in your diet each day. These foods are high in fiber. ? Drink plenty of fluids, enough so that your urine is light yellow or clear like water. If you have kidney, heart, or liver disease and have to limit fluids, talk with your doctor before you increase the amount of fluids you drink. ? Get some exercise every day. Build up slowly to 30 to 60 minutes a day on 5 or more days of the week. ? Take a fiber supplement, such as Citrucel or Metamucil, every day. Read and follow all instructions on the label. · If you and your doctor feel that diarrhea is part of the problem, try to avoid:  ? Alcohol. ? Caffeine. This is found in coffee, tea, cola drinks, and chocolate. ? Nicotine, from smoking or chewing tobacco.  ? Gas-producing foods.  These include beans, broccoli, cabbage, and apples. ? Dairy products that contain lactose (milk sugar). Examples are ice cream, milk, cheese, and sour cream.  ? Foods and drinks high in sugar, especially fruit juice, soda, candy, and other packaged sweets (such as cookies). ? Foods high in fat. These include yap, sausage, butter, oils, and anything deep-fried. ? Sorbitol and xylitol. These artificial sweeteners are found in some sugarless candies and chewing gum. Where can you learn more? Go to http://radha-abdiaziz.info/. Enter F314 in the search box to learn more about \"Diet for Fecal Incontinence: Care Instructions. \"  Current as of: March 28, 2018  Content Version: 11.9  © 7561-6442 [employer]. Care instructions adapted under license by [employer] (which disclaims liability or warranty for this information). If you have questions about a medical condition or this instruction, always ask your healthcare professional. [place] [address] any warranty or liability for your use of this information.

## 2019-03-11 NOTE — PROGRESS NOTES
Rm: 16    Chief Complaint   Patient presents with    Cholesterol Problem     Depression Screening:  3 most recent PHQ Screens 3/11/2019 12/26/2018 12/3/2018 9/5/2018 6/21/2018 3/16/2018 10/23/2017   Little interest or pleasure in doing things Not at all Not at all Not at all Not at all Not at all Not at all Several days   Feeling down, depressed, irritable, or hopeless Not at all Several days Not at all Not at all Not at all Not at all Several days   Total Score PHQ 2 0 1 0 0 0 0 2       Learning Assessment:  Learning Assessment 3/16/2018   PRIMARY LEARNER Patient   BARRIERS PRIMARY LEARNER HEARING   CO-LEARNER CAREGIVER No   PRIMARY LANGUAGE ENGLISH   LEARNER PREFERENCE PRIMARY DEMONSTRATION   ANSWERED BY patient   RELATIONSHIP SELF       Abuse Screening:  Abuse Screening Questionnaire 10/23/2017   Do you ever feel afraid of your partner? N   Are you in a relationship with someone who physically or mentally threatens you? N   Is it safe for you to go home? Y       Health Maintenance reviewed and discussed per provider: yes     Coordination of Care:    1. Have you been to the ER, urgent care clinic since your last visit? Hospitalized since your last visit? no    2. Have you seen or consulted any other health care providers outside of the [address] [place] [address] since your last visit? Include any pap smears or colon screening.  no

## 2019-06-14 ENCOUNTER — OFFICE VISIT (OUTPATIENT)
Dept: INTERNAL MEDICINE CLINIC | Age: 84
End: 2019-06-14

## 2019-06-14 VITALS
DIASTOLIC BLOOD PRESSURE: 55 MMHG | SYSTOLIC BLOOD PRESSURE: 138 MMHG | BODY MASS INDEX: 29.6 KG/M2 | RESPIRATION RATE: 16 BRPM | HEIGHT: 56 IN | WEIGHT: 131.6 LBS | HEART RATE: 66 BPM | OXYGEN SATURATION: 95 % | TEMPERATURE: 95.8 F

## 2019-06-14 DIAGNOSIS — K52.9 CHRONIC DIARRHEA: ICD-10-CM

## 2019-06-14 DIAGNOSIS — Z23 ENCOUNTER FOR IMMUNIZATION: ICD-10-CM

## 2019-06-14 DIAGNOSIS — N39.0 RECURRENT UTI: Primary | ICD-10-CM

## 2019-06-14 DIAGNOSIS — M70.21 OLECRANON BURSITIS OF RIGHT ELBOW: ICD-10-CM

## 2019-06-14 RX ORDER — LOPERAMIDE HCL 2 MG
2 TABLET ORAL
Qty: 90 TAB | Refills: 3 | Status: SHIPPED | OUTPATIENT
Start: 2019-06-14 | End: 2019-06-24

## 2019-06-14 RX ORDER — KETOCONAZOLE 20 MG/ML
SHAMPOO TOPICAL
Qty: 1 BOTTLE | Refills: 5 | Status: SHIPPED | OUTPATIENT
Start: 2019-06-14

## 2019-06-14 NOTE — PATIENT INSTRUCTIONS
Bursitis of the Elbow: Care Instructions  Your Care Instructions  Bursitis is pain and swelling of the bursae. These are sacs of fluid that help your joints move smoothly. Olecranon bursitis is a type of bursitis that affects the back of the elbow. This is sometimes called Enrique elbow because the bump that develops looks like the cartoon character Enrique's elbow. Injury, overuse, or prolonged pressure on your elbow can cause this form of bursitis. Sometimes it happens when people have arthritis. It also can occur for unknown reasons. Treatment may include draining fluid from the bursa with a needle. If your doctor thought there was infection, he or she may have prescribed antibiotics. You also may get shots of medicine into the bursa to help the swelling go down. Your elbow should get better in a few days or weeks. Follow-up care is a key part of your treatment and safety. Be sure to make and go to all appointments, and call your doctor if you are having problems. It's also a good idea to know your test results and keep a list of the medicines you take. How can you care for yourself at home? · Take pain medicines exactly as directed. ? If the doctor gave you a prescription medicine for pain, take it as prescribed. ? If you are not taking a prescription pain medicine, ask your doctor if you can take an over-the-counter medicine. ? Do not take two or more pain medicines at the same time unless the doctor told you to. Many pain medicines have acetaminophen, which is Tylenol. Too much acetaminophen (Tylenol) can be harmful. · If your doctor prescribed antibiotics, take them as directed. Do not stop taking them just because you feel better. You need to take the full course of antibiotics. · If your doctor gave you a sling, an elastic bandage, or a compression sleeve, wear it exactly as instructed. · Put ice or a cold pack on your elbow for 10 to 20 minutes at a time.  Try to do this every 1 to 2 hours for the next 3 days (when you are awake) or until the swelling goes down. Put a thin cloth between the ice and your skin. · After 3 days, you can try heat, or alternate heat and ice. · Rest your elbow. Try to stop or reduce any activity that causes pain. · Wear elbow pads during physical activity to prevent injury. · Do not lean your elbows on tables or armrests. When should you call for help? Call your doctor now or seek immediate medical care if:    · You have new or worse symptoms of infection, such as:  ? Increased pain, swelling, warmth, or redness. ? Red streaks leading from the area. ? Pus draining from the area. ? A fever.    Watch closely for changes in your health, and be sure to contact your doctor if:    · You do not get better as expected. Where can you learn more? Go to http://radha-abdiaziz.info/. Enter  in the search box to learn more about \"Bursitis of the Elbow: Care Instructions. \"  Current as of: September 20, 2018  Content Version: 11.9  © 0409-7142 Healthwise, Incorporated. Care instructions adapted under license by [employer] (which disclaims liability or warranty for this information). If you have questions about a medical condition or this instruction, always ask your healthcare professional. [address] any warranty or liability for your use of this information.

## 2019-06-14 NOTE — PROGRESS NOTES
Rm; 16    Chief Complaint   Patient presents with    Hypertension    Bursitis     right elbow     Depression Screening:  3 most recent PHQ Screens 6/14/2019 3/11/2019 12/26/2018 12/3/2018 9/5/2018 6/21/2018 3/16/2018   Little interest or pleasure in doing things Not at all Not at all Not at all Not at all Not at all Not at all Not at all   Feeling down, depressed, irritable, or hopeless Not at all Not at all Several days Not at all Not at all Not at all Not at all   Total Score PHQ 2 0 0 1 0 0 0 0       Learning Assessment:  Learning Assessment 3/16/2018   PRIMARY LEARNER Patient   BARRIERS PRIMARY LEARNER HEARING   CO-LEARNER CAREGIVER No   PRIMARY LANGUAGE ENGLISH   LEARNER PREFERENCE PRIMARY DEMONSTRATION   ANSWERED BY patient   RELATIONSHIP SELF       Abuse Screening:  Abuse Screening Questionnaire 10/23/2017   Do you ever feel afraid of your partner? N   Are you in a relationship with someone who physically or mentally threatens you? N   Is it safe for you to go home? Y       Health Maintenance reviewed and discussed per provider: yes     Coordination of Care:    1. Have you been to the ER, urgent care clinic since your last visit? Hospitalized since your last visit? no    2. Have you seen or consulted any other health care providers outside of the [address] [place] [address] since your last visit? Include any pap smears or colon screening. No    Presented for Pneumococcal-23 Vaccine. Administered in left deltoid without complaints. Pt observed for 5 min. No adverse reaction noted.  Pt left office in stable condition

## 2019-06-14 NOTE — PROGRESS NOTES
HISTORY OF PRESENT ILLNESS  Leo Drew is a 80 y.o. female. Here for f/u of UI, loose stools, elbow pain  Recent bronchitis for which she was seen in ED. Treated with azithromycin and steroids which improved sx  R elbow bursitis for past few weeks. Has been improving without intervention. Chronic UI, UTIs. Recently seen by new urologist. Had been doing kegels Had some buttock pain so stopped these Mon. Does have UTI for which she is on macrobid  Having chronic loose stools for past few months since switch to myrbetriq. Stopped fiber supplement as she thought this was contributing but no change with this. Hypertension    Pertinent negatives include no chest pain, no palpitations, no blurred vision, no headaches, no dizziness, no shortness of breath, no nausea and no vomiting. Bursitis   Pertinent negatives include no chest pain, no headaches and no shortness of breath. Review of Systems   Constitutional: Negative for chills, fever and weight loss. HENT: Negative for congestion and ear pain. Eyes: Negative for blurred vision and pain. Respiratory: Negative for cough and shortness of breath. Cardiovascular: Negative for chest pain, palpitations and leg swelling. Gastrointestinal: Positive for diarrhea. Negative for nausea and vomiting. Genitourinary: Positive for frequency and urgency. Musculoskeletal: Negative for joint pain and myalgias. Skin: Negative for itching and rash. Neurological: Negative for dizziness, tingling and headaches. Psychiatric/Behavioral: Negative for depression. The patient is not nervous/anxious.       Past Medical History:   Diagnosis Date    Arthritis     CAD (coronary artery disease)     Cancer ([facility] [address]     breast and colon    Colon cancer ([facility] [address] 2007    Coronary stent patent 2016    GERD (gastroesophageal reflux disease)     High cholesterol     Hypertension     Pacemaker 12/2018    Urinary incontinence      Current Outpatient Medications on File Prior to Visit   Medication Sig Dispense Refill    nitrofurantoin, macrocrystal-monohydrate, (MACROBID) 100 mg capsule Take 1 Cap by mouth two (2) times daily (with meals). 14 Cap 0    mirabegron ER (MYRBETRIQ) 25 mg ER tablet Take 1 Tab by mouth daily. 90 Tab 3    celecoxib (CELEBREX) 100 mg capsule Take  by mouth daily.  amLODIPine (NORVASC) 10 mg tablet 10 mg.      apixaban (ELIQUIS) 2.5 mg tablet 2.5 mg.      losartan (COZAAR) 50 mg tablet 25 mg.      sotalol (BETAPACE) 80 mg tablet 40 mg.      rosuvastatin (CRESTOR) 10 mg tablet 10 mg.      PREMARIN 0.625 mg/gram vaginal cream APPLY A SMALL AMOUNT TO AFFECTED AREA EVERY NIGHT AS DIRECTED 30 g 3    clobetasol 0.05 % spry APPLY TO AFFECTED AREA EVERY NIGHTAT BEDTIME FOR 2 WEEKS THEN AS NEEDED TO SCALP  2    nitroglycerin (NITROSTAT) 0.4 mg SL tablet One tab sublingual every 5 min for relief of pain x 3 as necessary. Report to ER if pain lasts longer than 30 min      albuterol (PROVENTIL HFA, VENTOLIN HFA, PROAIR HFA) 90 mcg/actuation inhaler Take 2 Puffs by inhalation every six (6) hours as needed for Wheezing. 1 Inhaler 3    DYMISTA 137-50 mcg/spray spry INSTILL 1 SPRAY TO EACH NOSTRIL TWICE A DAY  12    LACTOBACILLUS ACIDOPHILUS (PROBIOTIC PO) Take  by mouth daily.  VIT C/VIT E AC/LUT/COPPER/ZINC (PRESERVISION LUTEIN PO) Take  by mouth.  inhalational spacing device Use as directed with inhaler 1 Device 0     No current facility-administered medications on file prior to visit. Allergies   Allergen Reactions    Ciprofloxacin Nausea and Vomiting    Doxycycline Nausea and Vomiting    Novocain [Procaine] Swelling    Sulfa (Sulfonamide Antibiotics) Rash     Social History     Tobacco Use    Smoking status: Former Smoker     Types: Cigarettes     Last attempt to quit: 1983     Years since quittin.9    Smokeless tobacco: Never Used   Substance Use Topics    Alcohol use: Yes     Comment: rare    Drug use:  No Physical Exam   Constitutional: She appears well-developed and well-nourished. No distress. /55 (BP 1 Location: Right arm, BP Patient Position: Sitting)   Pulse 66   Temp 95.8 °F (35.4 °C) (Oral)   Resp 16   Ht 4' 8\" (1.422 m)   Wt 131 lb 9.6 oz (59.7 kg)   SpO2 95%   BMI 29.50 kg/m²      Eyes: EOM are normal. Right eye exhibits no discharge. Left eye exhibits no discharge. No scleral icterus. Neck: Neck supple. Cardiovascular: Normal rate, regular rhythm and normal heart sounds. Exam reveals no gallop and no friction rub. No murmur heard. Pulmonary/Chest: Effort normal and breath sounds normal. No respiratory distress. She has no wheezes. She has no rales. Musculoskeletal: She exhibits no edema or tenderness. Lymphadenopathy:     She has no cervical adenopathy. Neurological: She is alert. She exhibits normal muscle tone. Skin: Skin is warm and dry. Psychiatric: She has a normal mood and affect. Lab Results   Component Value Date/Time    Sodium 142 03/11/2019 01:28 PM    Potassium 4.7 03/11/2019 01:28 PM    Chloride 106 03/11/2019 01:28 PM    CO2 28 03/11/2019 01:28 PM    Anion gap 8 03/11/2019 01:28 PM    Glucose 154 (H) 03/11/2019 01:28 PM    BUN 23 (H) 03/11/2019 01:28 PM    Creatinine 0.93 03/11/2019 01:28 PM    BUN/Creatinine ratio 25 (H) 03/11/2019 01:28 PM    GFR est AA >60 03/11/2019 01:28 PM    GFR est non-AA 56 (L) 03/11/2019 01:28 PM    Calcium 9.3 03/11/2019 01:28 PM    Bilirubin, total 0.6 03/11/2019 01:28 PM    AST (SGOT) 19 03/11/2019 01:28 PM    Alk. phosphatase 73 03/11/2019 01:28 PM    Protein, total 6.7 03/11/2019 01:28 PM    Albumin 3.9 03/11/2019 01:28 PM    Globulin 2.8 03/11/2019 01:28 PM    A-G Ratio 1.4 03/11/2019 01:28 PM    ALT (SGPT) 22 03/11/2019 01:28 PM     ASSESSMENT and PLAN    ICD-10-CM ICD-9-CM    1. Recurrent UTI N39.0 599.0    2. Olecranon bursitis of right elbow M70.21 726.33    3. Chronic diarrhea K52.9 787.91    4.  Encounter for immunization Z23 V03.89 PNEUMOCOCCAL POLYSACCHARIDE VACCINE, 23-VALENT, ADULT OR IMMUNOSUPPRESSED PT DOSE,     Continue f/u with urology as planned  Will try imodium daily prn for loose stools  Discussed avoiding pressure to elbow  Pneumovax given today.

## 2019-07-30 ENCOUNTER — HOSPITAL ENCOUNTER (OUTPATIENT)
Dept: MAMMOGRAPHY | Age: 84
Discharge: HOME | End: 2019-07-30
Attending: INTERNAL MEDICINE
Payer: MEDICARE

## 2019-07-30 DIAGNOSIS — Z12.31 VISIT FOR SCREENING MAMMOGRAM: ICD-10-CM

## 2019-07-30 PROCEDURE — 77063 BREAST TOMOSYNTHESIS BI: CPT

## 2019-09-16 ENCOUNTER — OFFICE VISIT (OUTPATIENT)
Dept: FAMILY MEDICINE CLINIC | Age: 84
End: 2019-09-16

## 2019-09-16 VITALS
TEMPERATURE: 98.1 F | SYSTOLIC BLOOD PRESSURE: 120 MMHG | DIASTOLIC BLOOD PRESSURE: 60 MMHG | RESPIRATION RATE: 16 BRPM | HEIGHT: 56 IN | WEIGHT: 134 LBS | HEART RATE: 69 BPM | OXYGEN SATURATION: 96 % | BODY MASS INDEX: 30.14 KG/M2

## 2019-09-16 DIAGNOSIS — Z85.038 PERSONAL HISTORY OF COLON CANCER: ICD-10-CM

## 2019-09-16 DIAGNOSIS — N39.0 RECURRENT UTI: ICD-10-CM

## 2019-09-16 DIAGNOSIS — R68.89 ABNORMAL ANKLE BRACHIAL INDEX (ABI): ICD-10-CM

## 2019-09-16 DIAGNOSIS — R15.9 INCONTINENCE OF FECES, UNSPECIFIED FECAL INCONTINENCE TYPE: ICD-10-CM

## 2019-09-16 DIAGNOSIS — I48.91 ATRIAL FIBRILLATION, UNSPECIFIED TYPE (HCC): ICD-10-CM

## 2019-09-16 DIAGNOSIS — Z86.69: ICD-10-CM

## 2019-09-16 DIAGNOSIS — I25.10 CORONARY ARTERY DISEASE INVOLVING NATIVE CORONARY ARTERY OF NATIVE HEART WITHOUT ANGINA PECTORIS: ICD-10-CM

## 2019-09-16 DIAGNOSIS — I10 ESSENTIAL HYPERTENSION: ICD-10-CM

## 2019-09-16 DIAGNOSIS — N32.81 OVERACTIVE BLADDER: Primary | ICD-10-CM

## 2019-09-16 DIAGNOSIS — Z23 ENCOUNTER FOR IMMUNIZATION: ICD-10-CM

## 2019-09-16 NOTE — PROGRESS NOTES
Chief Complaint   Patient presents with   [address]     Patient presents for High Dose flu vaccine. Consent obtained, Tolerated procedure well at left deltoid. Patient remained in office 10 minutes post vaccine.  No side effects noted    Lot# [id number]  Exp: 06/30/20  [employer]: [id number]

## 2019-09-16 NOTE — PROGRESS NOTES
HISTORY OF PRESENT ILLNESS  Tl Nino is a 80 y.o. female. HPI: new patient. Accompanied with her daughter. Multiple medical problem . H/o CAD. Prior stent. On medical management. Following cardiologist. Also s/p pacemaker and h/o a.fib. On anticoagulation. Currently stable HR. No side effect of medication. Denies any anginal symptoms. H/o recurrent UTI secondary to over active bladder. Following urology. Discussed frequent use of bathroom and avoid wet depends . She will work on it . Will f/u next visit. Denies any abdominal pain or pelvic pain at this time. Said sometimes loose stool/ constipation. Said she is also feeling bowel incontinence lately. Noted hard stool on depends. Been taking probiotic. Prior h/o colon cancer. Post colon resection. Seen GI and recommended imodium to avoid constipation. No blood in stool. Discussed to see colorectal surgeon to evaluate and further recommendations. Pt and daughter both agree. Following ophthalmology regarding retinal hemorrhage , rt eye. Following their recommendations. Gradually improving per daughter. Will follow their recommendations. No vision changes at this time. Vitals stable. Visit Vitals  /60 (BP 1 Location: Left arm, BP Patient Position: Sitting)   Pulse 69   Temp 98.1 °F (36.7 °C) (Oral)   Resp 16   Ht 4' 8\" (1.422 m)   Wt 134 lb (60.8 kg)   SpO2 96%   BMI 30.04 kg/m²     Review medication list, vitals, problem list,allergies. C/o bilateral lower ext pain and upper ext pain. She was diagnosed with neuropathy on upper ext by neurology by doing EMG. Podiatry has done COLLIN and noted result through care everywhere. Sever over rt lower ext and moderate over left lower ext. She is c/o leg  Claudication. Agree to take flu shot today.    Lab Results   Component Value Date/Time    WBC 7.4 03/11/2019 01:28 PM    HGB 14.5 03/11/2019 01:28 PM    HCT 45.8 (H) 03/11/2019 01:28 PM    PLATELET 720 (L) 10/47/4520 01:28 PM    MCV 94.6 03/11/2019 01:28 PM     Lab Results   Component Value Date/Time    Sodium 142 03/11/2019 01:28 PM    Potassium 4.7 03/11/2019 01:28 PM    Chloride 106 03/11/2019 01:28 PM    CO2 28 03/11/2019 01:28 PM    Anion gap 8 03/11/2019 01:28 PM    Glucose 154 (H) 03/11/2019 01:28 PM    BUN 23 (H) 03/11/2019 01:28 PM    Creatinine 0.93 03/11/2019 01:28 PM    BUN/Creatinine ratio 25 (H) 03/11/2019 01:28 PM    GFR est AA >60 03/11/2019 01:28 PM    GFR est non-AA 56 (L) 03/11/2019 01:28 PM    Calcium 9.3 03/11/2019 01:28 PM    Bilirubin, total 0.6 03/11/2019 01:28 PM    AST (SGOT) 19 03/11/2019 01:28 PM    Alk. phosphatase 73 03/11/2019 01:28 PM    Protein, total 6.7 03/11/2019 01:28 PM    Albumin 3.9 03/11/2019 01:28 PM    Globulin 2.8 03/11/2019 01:28 PM    A-G Ratio 1.4 03/11/2019 01:28 PM    ALT (SGPT) 22 03/11/2019 01:28 PM     Lab Results   Component Value Date/Time    Cholesterol, total 135 03/11/2019 01:28 PM    HDL Cholesterol 52 03/11/2019 01:28 PM    LDL, calculated 46.8 03/11/2019 01:28 PM    VLDL, calculated 36.2 03/11/2019 01:28 PM    Triglyceride 181 (H) 03/11/2019 01:28 PM    CHOL/HDL Ratio 2.6 03/11/2019 01:28 PM     Lab Results   Component Value Date/Time    TSH 1.80 03/11/2019 01:28 PM       ROS: denies any headache, dizziness, no chest pain or sob. No cold or cough. No wheezing. No palpitation or diaphoresis. No mood changes. No appetite or weight changes. No unusual fatigue. Sleep is fair. Physical Exam   Constitutional: She is oriented to person, place, and time. No distress. Cardiovascular: Normal heart sounds. Pulmonary/Chest: No respiratory distress. She has no wheezes. Abdominal: Soft. There is no tenderness. Musculoskeletal: She exhibits no edema. Neurological: She is oriented to person, place, and time. Psychiatric: Her behavior is normal.       ASSESSMENT and PLAN    ICD-10-CM ICD-9-CM    1. Overactive bladder: recurrent UTI. On and off on antibiotic. Advised to take probiotic. Avoid wet depends. Will be following urology recommendations. N32.81 596.51     multiple urology procedure. 2. Recurrent UTI N39.0 599.0    3. Essential hypertension: fairly stable. Continue current plan. Following cardiology. Low salt diet. I10 401.9    4. Encounter for immunization Z23 V03.89 INFLUENZA VACCINE INACTIVATED (IIV), SUBUNIT, ADJUVANTED, IM      ADMIN INFLUENZA VIRUS VAC   5. Atrial fibrillation, unspecified type [facility]): on anticoagulation. Following cardiology. Asymptomatic. I48.91 427.31     post pacemaker    6. Incontinence of feces, unspecified fecal incontinence type: sending to colorectal surgeon for further recommendations. Prior h/o colon cancer and colon resection. R15.9 787.60 REFERRAL TO COLON AND RECTAL SURGERY   7. Personal history of colon cancer Z85.038 V10.05    8. Coronary artery disease involving native coronary artery of native heart without angina pectoris: on risk factor management. On statin. I25.10 414.01     1999, 2016. stent placement. Dr. Senthil Fields / Dr. Mara Adrian cardiologist    9. Abnormal ankle brachial index (COLLIN): sending to vascular specialist. Contact info provided to daughter. R68.89 796.4 REFERRAL TO VASCULAR CENTER   10. H/O retinal hemorrhage Z86.69 V12.49     rt side. following opthalmology. Pt understood and agree with the plan   Review hM   Follow-up and Dispositions    · Return in about 3 weeks (around 10/7/2019).

## 2019-09-16 NOTE — PATIENT INSTRUCTIONS
Vaccine Information Statement    Influenza (Flu) Vaccine (Inactivated or Recombinant): What You Need to Know    Many Vaccine Information Statements are available in French and other languages. See www.immunize.org/vis  Hojas de información sobre vacunas están disponibles en español y en muchos otros idiomas. Visite www.immunize.org/vis    1. Why get vaccinated? Influenza vaccine can prevent influenza (flu). Flu is a contagious disease that spreads around the [facility] every year, usually between October and May. Anyone can get the flu, but it is more dangerous for some people. Infants and young children, people 72years of age and older, pregnant women, and people with certain health conditions or a weakened immune system are at greatest risk of flu complications. Pneumonia, bronchitis, sinus infections and ear infections are examples of flu-related complications. If you have a medical condition, such as heart disease, cancer or diabetes, flu can make it worse. Flu can cause fever and chills, sore throat, muscle aches, fatigue, cough, headache, and runny or stuffy nose. Some people may have vomiting and diarrhea, though this is more common in children than adults. Each year thousands of people in the [facility] die from flu, and many more are hospitalized. Flu vaccine prevents millions of illnesses and flu-related visits to the doctor each year. 2. Influenza vaccines     CDC recommends everyone 10months of age and older get vaccinated every flu season. Children 6 months through 6years of age may need 2 doses during a single flu season. Everyone else needs only 1 dose each flu season. It takes about 2 weeks for protection to develop after vaccination. There are many flu viruses, and they are always changing. Each year a new flu vaccine is made to protect against three or four viruses that are likely to cause disease in the upcoming flu season.  Even when the vaccine doesnt exactly match these viruses, it may still provide some protection. Influenza vaccine does not cause flu. Influenza vaccine may be given at the same time as other vaccines. 3. Talk with your health care provider    Tell your vaccine provider if the person getting the vaccine:   Has had an allergic reaction after a previous dose of influenza vaccine, or has any severe, life-threatening allergies.  Has ever had Guillain-Barré Syndrome (also called GBS). In some cases, your health care provider may decide to postpone influenza vaccination to a future visit. People with minor illnesses, such as a cold, may be vaccinated. People who are moderately or severely ill should usually wait until they recover before getting influenza vaccine. Your health care provider can give you more information. 4. Risks of a reaction     Soreness, redness, and swelling where shot is given, fever, muscle aches, and headache can happen after influenza vaccine.  There may be a very small increased risk of Guillain-Barré Syndrome (GBS) after inactivated influenza vaccine (the flu shot). [facility] children who get the flu shot along with pneumococcal vaccine (PCV13), and/or DTaP vaccine at the same time might be slightly more likely to have a seizure caused by fever. Tell your health care provider if a child who is getting flu vaccine has ever had a seizure. People sometimes faint after medical procedures, including vaccination. Tell your provider if you feel dizzy or have vision changes or ringing in the ears. As with any medicine, there is a very remote chance of a vaccine causing a severe allergic reaction, other serious injury, or death. 5. What if there is a serious problem? An allergic reaction could occur after the vaccinated person leaves the clinic.  If you see signs of a severe allergic reaction (hives, swelling of the face and throat, difficulty breathing, a fast heartbeat, dizziness, or weakness), call 9-1-1 and get the person to the nearest hospital.    For other signs that concern you, call your health care provider. Adverse reactions should be reported to the Vaccine Adverse Event Reporting System (VAERS). Your health care provider will usually file this report, or you can do it yourself. Visit the VAERS website at www.vaers. [facility].gov or call 6-547.463.5408. VAERS is only for reporting reactions, and VAERS staff do not give medical advice. 6. The National Vaccine Injury Compensation Program    The [facility] Vaccine Injury Compensation Program (VICP) is a federal program that was created to compensate people who may have been injured by certain vaccines. Visit the VICP website at www.[facility].gov/vaccinecompensation or call 0-461.750.2283 to learn about the program and about filing a claim. There is a time limit to file a claim for compensation. 7. How can I learn more?  Ask your health care provider.  Call your local or state health department.  Contact the Centers for Disease Control and Prevention (CDC):  - Call 4-159.643.9880 (1-800-CDC-INFO) or  - Visit CDCs influenza website at www.cdc.gov/flu    Vaccine Information Statement (Interim)  Inactivated Influenza Vaccine   8/15/2019  [id number]   Department of Health and Human Services  Centers for Disease Control and Prevention    Office Use Only

## 2019-09-18 ENCOUNTER — OFFICE VISIT (OUTPATIENT)
Dept: VASCULAR SURGERY | Age: 84
End: 2019-09-18

## 2019-09-18 VITALS
RESPIRATION RATE: 17 BRPM | BODY MASS INDEX: 30.14 KG/M2 | HEIGHT: 56 IN | WEIGHT: 134 LBS | SYSTOLIC BLOOD PRESSURE: 120 MMHG | DIASTOLIC BLOOD PRESSURE: 60 MMHG

## 2019-09-18 DIAGNOSIS — I65.23 BILATERAL CAROTID ARTERY STENOSIS: ICD-10-CM

## 2019-09-18 DIAGNOSIS — I73.9 PAD (PERIPHERAL ARTERY DISEASE) (HCC): ICD-10-CM

## 2019-09-18 DIAGNOSIS — M51.37 DDD (DEGENERATIVE DISC DISEASE), LUMBOSACRAL: ICD-10-CM

## 2019-09-18 DIAGNOSIS — I48.0 PAROXYSMAL ATRIAL FIBRILLATION (HCC): ICD-10-CM

## 2019-09-18 DIAGNOSIS — I70.213 ATHEROSCLEROSIS OF NATIVE ARTERY OF BOTH LOWER EXTREMITIES WITH INTERMITTENT CLAUDICATION (HCC): Primary | ICD-10-CM

## 2019-09-18 DIAGNOSIS — I25.10 CORONARY ARTERY DISEASE INVOLVING NATIVE CORONARY ARTERY OF NATIVE HEART WITHOUT ANGINA PECTORIS: ICD-10-CM

## 2019-09-18 NOTE — PROGRESS NOTES
1. Have you been to an emergency room or urgent care clinic since your last visit? NO    Hospitalized since your last visit? If yes, where, when, and reason for visit? No  2. Have you seen or consulted any other health care providers outside of the [facility] since your last visit including any procedures, health maintenance items. If yes, where, when and reason for visit?  NO

## 2019-09-18 NOTE — PROGRESS NOTES
Brandi Subramanian    Chief Complaint   Patient presents with    New Patient     Abnormal COLLIN       HPI    Brandi Subramanian is a 80 y.o. female who presents to the office today for PAD. She presents to the office today with her daughter. Reportedly she has been complaining of some claudication symptoms and was seen by her podiatrist who ordered an COLLIN. COLLIN revealed severe arterial insufficiency in the left lower extremity and moderate arterial insufficiency on the right. COLLIN on the left was 0.41 and on the right 0.55. She is not complaining of any rest pain. She denies any skin changes. She has no history of nonhealing ulcers. She states that she was able to walk from our parking lot to the front doors before having to stop and rest.  She states that if she stops for short. She can then proceed and walk without pain. Her daughter states that thte patient can go about her daily business to her doctor's offices, out to lunch, grocery shopping or to her hair appointments without issue. She does not complain of any swelling in the legs. She does have a history of coronary artery disease status post stents. She also has a pacemaker placed. Her daughter who is a nurse anesthetist states that when her mother had her pacemaker placed she had a severe reaction to the anesthesia and ended up in the ICU for an extended period on pressors. She does have a history of arthritis and chronic back pain as well as left-sided sciatica. She is on chronic anticoagulation with Eliquis due to paroxysmal atrial fibrillation. She has no personal history of diabetes and is a non-smoker. Otherwise she is a fairly active 80year-old. She did recently finish a round of physical therapy for her back and states that she did very well with this.   Also of note she did have a CTA of the head and neck done in December 2018 which showed 51% stenosis of the right internal carotid artery and approximately 20% stenosis of the left internal carotid artery. She denies any strokelike symptoms or symptoms of amaurosis fugax.     Past Medical History:   Diagnosis Date    Arthritis     CAD (coronary artery disease)     Cancer ([facility] [address]     breast and colon    Colon cancer ([facility] [address] 2007    Coronary stent patent 2016    GERD (gastroesophageal reflux disease)     High cholesterol     Hypertension     Pacemaker 12/2018    Urinary incontinence      Patient Active Problem List   Diagnosis Code    Bulge of cervical disc without myelopathy M50.20    Bulge of thoracic disc without myelopathy M51.24    Bulge of lumbar disc without myelopathy M51.26    Cervical spondylosis without myelopathy M47.812    Thoracic spondylosis without myelopathy M47.814    Lumbosacral spondylosis without myelopathy M47.817    Spinal stenosis of lumbar region M48.061    Cervical neuritis M54.12    Lumbar neuritis M54.16    DDD (degenerative disc disease), cervical M50.30    DDD (degenerative disc disease), thoracic M51.34    DDD (degenerative disc disease), lumbar M51.36    Coronary artery disease involving native coronary artery of native heart without angina pectoris I25.10    Recurrent UTI N39.0    DDD (degenerative disc disease), lumbosacral M51.37    Essential hypertension I10    Other cervical disc displacement, unspecified cervical region M50.20    Other intervertebral disc displacement, thoracic region M51.24    Other intervertebral disc displacement, lumbar region M51.26    Spondylosis without myelopathy or radiculopathy, cervical region M47.812    Personal history of colon cancer Z85.038    Incontinence of feces R15.9    Atrial fibrillation ([facility] [address] I48.91    Overactive bladder N32.81     Past Surgical History:   Procedure Laterality Date    HX APPENDECTOMY      HX BREAST BIOPSY      HX CARPAL TUNNEL RELEASE Left     HX CHOLECYSTECTOMY      HX CHOLECYSTECTOMY      HX CORONARY STENT PLACEMENT  09/09/2016    HX HYSTERECTOMY      HX OTHER SURGICAL      bladder surgeries x3    HX PACEMAKER PLACEMENT  12/07/2018     Current Outpatient Medications   Medication Sig Dispense Refill    mirabegron ER (MYRBETRIQ) 25 mg ER tablet Take 1 Tab by mouth daily. 90 Tab 3    loperamide (IMODIUM) 2 mg capsule TAKE 1 CAPSULE BY MOUTH ONCE A DAY AS NEEDED FOR DIARRHEA FOR UP TO 10 DAYS  3    B infantis/B ani/B lorene/B bifid (PROBIOTIC 4X PO) Take  by mouth.  cranberry extract 650 mg cap Take 1 Cap by mouth.  ketoconazole (NIZORAL) 2 % shampoo Use as directed daily prn 1 Bottle 5    celecoxib (CELEBREX) 100 mg capsule Take  by mouth daily.  amLODIPine (NORVASC) 10 mg tablet 10 mg.      apixaban (ELIQUIS) 2.5 mg tablet 2.5 mg.      losartan (COZAAR) 50 mg tablet 25 mg.      sotalol (BETAPACE) 80 mg tablet 40 mg.      rosuvastatin (CRESTOR) 10 mg tablet 10 mg.      PREMARIN 0.625 mg/gram vaginal cream APPLY A SMALL AMOUNT TO AFFECTED AREA EVERY NIGHT AS DIRECTED 30 g 3    clobetasol 0.05 % spry APPLY TO AFFECTED AREA EVERY NIGHTAT BEDTIME FOR 2 WEEKS THEN AS NEEDED TO SCALP  2    nitroglycerin (NITROSTAT) 0.4 mg SL tablet One tab sublingual every 5 min for relief of pain x 3 as necessary. Report to ER if pain lasts longer than 30 min      VIT C/VIT E AC/LUT/COPPER/ZINC (PRESERVISION LUTEIN PO) Take  by mouth.  albuterol (PROVENTIL HFA, VENTOLIN HFA, PROAIR HFA) 90 mcg/actuation inhaler Take 2 Puffs by inhalation every six (6) hours as needed for Wheezing.  1 Inhaler 3    DYMISTA 137-50 mcg/spray spry INSTILL 1 SPRAY TO EACH NOSTRIL TWICE A DAY  12     Allergies   Allergen Reactions    Novocain [Procaine] Swelling    Sulfa (Sulfonamide Antibiotics) Rash     Social History     Socioeconomic History    Marital status:      Spouse name: Not on file    Number of children: Not on file    Years of education: Not on file    Highest education level: Not on file   Occupational History    Occupation: retired   Social Needs    Financial resource strain: Not on file    Food insecurity:     Worry: Not on file     Inability: Not on file    Transportation needs:     Medical: Not on file     Non-medical: Not on file   Tobacco Use    Smoking status: Former Smoker     Types: Cigarettes     Last attempt to quit: 1983     Years since quittin.1    Smokeless tobacco: Never Used   Substance and Sexual Activity    Alcohol use: Yes     Comment: rare    Drug use: No    Sexual activity: Not Currently   Lifestyle    Physical activity:     Days per week: Not on file     Minutes per session: Not on file    Stress: Not on file   Relationships    Social connections:     Talks on phone: Not on file     Gets together: Not on file     Attends Mosque service: Not on file     Active member of club or organization: Not on file     Attends meetings of clubs or organizations: Not on file     Relationship status: Not on file    Intimate partner violence:     Fear of current or ex partner: Not on file     Emotionally abused: Not on file     Physically abused: Not on file     Forced sexual activity: Not on file   Other Topics Concern    Not on file   Social History Narrative    Not on file      Family History   Problem Relation Age of Onset    Stroke Mother     Heart Disease Father         chf    Cancer Sister        Review of Systems    Constitutional: negative   HEENT: negative   Respiratory: negative   Cardiovascular: negative   Gastrointestinal: negative   Genitourinary:negative   Hematologic/lymphatic: negative   Musculoskeletal: Positive for low back pain, bilateral lower extremity claudication  Neurological: Positive for left-sided sciatica  Behavioral/Psych: negative   Endocrine: negative   Allergic/Immunologic: negative      Physical Exam:    Visit Vitals  /60 (BP 1 Location: Left arm, BP Patient Position: Sitting)   Resp 17   Ht 4' 8\" (1.422 m)   Wt 134 lb (60.8 kg)   BMI 30.04 kg/m²      General: Well-appearing elderly female in no acute distress. She does ambulate with a walker  HEENT: EOMI, no scleral icterus is noted. No carotid bruits are heard bilaterally   Cardiovascular: Regular rhythm normal S1-S2 no rubs murmurs or gallops. Diminished pedal pulses bilaterally. Pulmonary: No increased work or breathing is noted. Clear to auscultation bilaterally. No wheeze, rales or rhonchi. Abdomen: soft, nondistended. Extremities: Warm and well perfused bilaterally. No bilateral lower extremity edema. She does have some scattered varicosities in the lower leg. No ulcers. Neuro: Cranial nerves II through XII are grossly intact   Integument: No ulcerations are identified visibly      Impression and Plan:  Andrade Hernandez is a 80 y.o. female with bilateral lower extremity claudication. ABIs revealed moderate to severe arterial insufficiency in the bilateral lower extremities. She denies any symptoms of rest pain. She has no skin changes or nonhealing ulcers. No temperature changes to the lower extremities. She states that she does not feel her claudication is lifestyle limiting at this current time as she is able to go about her daily activities for the most part. I discussed intervention with angiogram versus continued conservative management with routine surveillance. Seeing as her symptoms are not lifestyle limiting at this current time I would not recommend aggressive intervention with angiogram and both patient and her daughter agree. We will repeat arterial studies in 6 months and have her follow-up afterwards. We will also obtain carotid artery duplex at that time for routine surveillance. They were educated on the signs and symptoms of worsening arterial insufficiency and will certainly call the office sooner as needed. Patient states that she will also continue with her walking program and try to continue to remain active on a daily basis. Plan was discussed. Patient expresses understanding and agrees.     [address] [facility] [place]        PLEASE NOTE:  This document has been produced using voice recognition software. Unrecognized errors in transcription may be present.

## 2019-09-30 ENCOUNTER — HOSPITAL ENCOUNTER (OUTPATIENT)
Dept: LAB | Age: 84
Discharge: HOME | End: 2019-09-30
Payer: MEDICARE

## 2019-09-30 ENCOUNTER — OFFICE VISIT (OUTPATIENT)
Dept: SURGERY | Age: 84
End: 2019-09-30

## 2019-09-30 VITALS
DIASTOLIC BLOOD PRESSURE: 59 MMHG | OXYGEN SATURATION: 97 % | WEIGHT: 132 LBS | HEIGHT: 56 IN | HEART RATE: 71 BPM | TEMPERATURE: 97 F | BODY MASS INDEX: 29.69 KG/M2 | SYSTOLIC BLOOD PRESSURE: 113 MMHG | RESPIRATION RATE: 17 BRPM

## 2019-09-30 DIAGNOSIS — Z85.038 PERSONAL HISTORY OF COLON CANCER: ICD-10-CM

## 2019-09-30 DIAGNOSIS — R15.9 INCONTINENCE OF FECES, UNSPECIFIED FECAL INCONTINENCE TYPE: Primary | ICD-10-CM

## 2019-09-30 PROCEDURE — 36415 COLL VENOUS BLD VENIPUNCTURE: CPT

## 2019-09-30 PROCEDURE — 82378 CARCINOEMBRYONIC ANTIGEN: CPT

## 2019-09-30 NOTE — PROGRESS NOTES
HPI: Caity Chan is a 80 y.o. female presenting with chief complain of fecal incontinence. She says over the last 6 months she has had fecal leakage. The stool is liquid or soft. It happens every 3 to 4 days. She denies abdominal complaints and moves her bowels once per day. She denies blood per rectum. She states her last colonoscopy was 10 years ago. She had surgery in  for a colon cancer. Evidently she had adjuvant radiation therapy. Her daughter states she had recent CT imaging of her chest abdomen and pelvis which were negative.   Her daughter is a nurse anesthetist.    Past Medical History:   Diagnosis Date    A-fib ([address]     Arthritis     CAD (coronary artery disease)     Cancer ([facility] [address]     breast and colon    Colon cancer ([facility] [address]     Coronary stent patent     GERD (gastroesophageal reflux disease)     High cholesterol     Hypertension     Pacemaker 2018    Peripheral neuropathy     hands    Urinary incontinence        Past Surgical History:   Procedure Laterality Date    ABDOMEN SURGERY PROC UNLISTED  2006    colon resection with anastomosis    HX APPENDECTOMY      HX BREAST BIOPSY Left 2006    lumpectomy    HX CARPAL TUNNEL RELEASE Left     HX CORONARY STENT PLACEMENT  , 2016    HX HYSTERECTOMY      HX OTHER SURGICAL      bladder surgeries x3    HX PACEMAKER PLACEMENT  2018       Family History   Problem Relation Age of Onset    Stroke Mother     Heart Disease Father         chf    Cancer Sister        Social History     Socioeconomic History    Marital status:      Spouse name: Not on file    Number of children: Not on file    Years of education: Not on file    Highest education level: Not on file   Occupational History    Occupation: retired   Tobacco Use    Smoking status: Former Smoker     Types: Cigarettes     Last attempt to quit: 1983     Years since quittin.2    Smokeless tobacco: Never Used   Substance and Sexual Activity    Alcohol use: Yes     Comment: rare    Drug use: No    Sexual activity: Not Currently       Review of Systems - Review of Systems   Constitutional: Positive for malaise/fatigue. Negative for chills, diaphoresis, fever and weight loss. HENT: Positive for hearing loss. Negative for congestion, ear discharge, ear pain, nosebleeds, sinus pain, sore throat and tinnitus. Hearing aides   Eyes: Positive for blurred vision. Hx retinal bleed  Macular degeneration   Respiratory: Positive for wheezing. Negative for cough, hemoptysis, sputum production, shortness of breath and stridor. Cardiovascular: Negative. Gastrointestinal: Positive for abdominal pain and diarrhea. Negative for blood in stool, constipation, heartburn, melena, nausea and vomiting. Genitourinary: Positive for frequency and urgency. Negative for dysuria, flank pain and hematuria. Musculoskeletal: Positive for back pain, joint pain and neck pain. Negative for falls. Skin: Negative. Neurological: Negative. Endo/Heme/Allergies: Negative for environmental allergies and polydipsia. Bruises/bleeds easily. Psychiatric/Behavioral: Positive for memory loss. Negative for depression, hallucinations, substance abuse and suicidal ideas. The patient is not nervous/anxious and does not have insomnia. Outpatient Medications Marked as Taking for the 9/30/19 encounter (Office Visit) with Nidia Rhodes MD   Medication Sig Dispense Refill    amoxicillin-clavulanate (AUGMENTIN) 875-125 mg per tablet Take 1 Tab by mouth two (2) times a day for 7 days. 14 Tab 0    mirabegron ER (MYRBETRIQ) 25 mg ER tablet Take 1 Tab by mouth daily. 90 Tab 3    loperamide (IMODIUM) 2 mg capsule TAKE 1 CAPSULE BY MOUTH ONCE A DAY AS NEEDED FOR DIARRHEA FOR UP TO 10 DAYS  3    B infantis/B ani/B lorene/B bifid (PROBIOTIC 4X PO) Take  by mouth.  cranberry extract 650 mg cap Take 1 Cap by mouth.       ketoconazole (NIZORAL) 2 % shampoo Use as directed daily prn 1 Bottle 5    celecoxib (CELEBREX) 100 mg capsule Take  by mouth daily.  amLODIPine (NORVASC) 10 mg tablet 10 mg.      apixaban (ELIQUIS) 2.5 mg tablet 2.5 mg.      losartan (COZAAR) 50 mg tablet 25 mg.      sotalol (BETAPACE) 80 mg tablet 40 mg.      rosuvastatin (CRESTOR) 10 mg tablet 10 mg.      PREMARIN 0.625 mg/gram vaginal cream APPLY A SMALL AMOUNT TO AFFECTED AREA EVERY NIGHT AS DIRECTED 30 g 3    clobetasol 0.05 % spry APPLY TO AFFECTED AREA EVERY NIGHTAT BEDTIME FOR 2 WEEKS THEN AS NEEDED TO SCALP  2    nitroglycerin (NITROSTAT) 0.4 mg SL tablet One tab sublingual every 5 min for relief of pain x 3 as necessary. Report to ER if pain lasts longer than 30 min      VIT C/VIT E AC/LUT/COPPER/ZINC (PRESERVISION LUTEIN PO) Take  by mouth.  albuterol (PROVENTIL HFA, VENTOLIN HFA, PROAIR HFA) 90 mcg/actuation inhaler Take 2 Puffs by inhalation every six (6) hours as needed for Wheezing. 1 Inhaler 3    DYMISTA 137-50 mcg/spray spry INSTILL 1 SPRAY TO EACH NOSTRIL TWICE A DAY  12       Allergies   Allergen Reactions    Novocain [Procaine] Swelling    Sulfa (Sulfonamide Antibiotics) Rash       Vitals:    09/30/19 1308   Weight: 59.9 kg (132 lb)   PainSc:   0 - No pain       Physical Exam   Constitutional: She appears well-developed and well-nourished. HENT:   Head: Normocephalic and atraumatic. Eyes: Conjunctivae and EOM are normal.   Abdominal: Soft. She exhibits no distension. There is no tenderness. Musculoskeletal: Normal range of motion. Lymphadenopathy:     She has no cervical adenopathy. Right: No inguinal adenopathy present. Left: No inguinal adenopathy present. Neurological: No sensory deficit. Skin: Skin is warm and dry. No rash noted. Psychiatric: She has a normal mood and affect.  Her speech is normal.   Digital rectal exam: Significantly decreased resting tone, minimal voluntary squeeze pressures, no mass    Assessment / Plan    Fecal incontinence  Trial of fiber therapy with Imodium  Consider referral to biofeedback therapy, though it sounds as if the patient has had something similar for urinary incontinence  Asked to discuss sacral nerve stimulator with Dr. Candido Larsen to see if she is a candidate for this if conservative measures fail    Personal history of colon cancer  Retrieve records from surgeon in [place], unusual to have radiation therapy after colon cancer resection  Check CEA  We discussed colonoscopy but will defer on this given prior issues with anesthesia and age    Follow-up in the office in 4 to 6 weeks    The diagnoses and plan were discussed with the patient. All questions answered. Plan of care agreed to by all concerned.

## 2019-09-30 NOTE — LETTER
9/30/19 Patient: Edil Osborne YOB: 1926 Date of Visit: 9/30/2019 Wesly Arroyo MD 
[address] [place] [address] [address] [id number] [address] [address] VIA In Basket Dear Georges Whittaker saw nAt Calderon in the office today for fecal incontinence which has been worsening over the last 6 months. She states this occurs every 3 to 4 days. Her exam is significant for decreased resting tone and poor voluntary squeeze pressures. She has a history of colon cancer and underwent resection 10 years ago. She states she has not had a colonoscopy since that time. I will attempt to control the incontinent issues with fiber bulking agents and Imodium. She will return to the office if this is not sufficient. I will also check a CEA level for her history of colon cancer. We discussed the option of colonoscopy and will defer on this given her age and comorbidities. If you have questions, please do not hesitate to call me. I look forward to following your patient along with you. Sincerely, Nette Truong MD

## 2019-09-30 NOTE — PATIENT INSTRUCTIONS
Speak to urologist about sacral nerve stimulator      Try Citruel or Metamucil once a day along with 1 Immodium twice a day    Follow up in 4-6 weeks

## 2019-10-01 LAB — CEA SERPL-MCNC: 2.1 NG/ML

## 2019-10-02 ENCOUNTER — TELEPHONE (OUTPATIENT)
Dept: SURGERY | Age: 84
End: 2019-10-02

## 2019-10-02 NOTE — TELEPHONE ENCOUNTER
----- Message from Skylar Cai MD sent at 10/1/2019  2:40 PM EDT -----  Call the pt and tell her her CEA is normal    ----- Message -----  From: Kingston Baptiste In [facility]  Sent: 10/1/2019  12:47 PM EDT  To: Skylar Cai MD      Notified patients daughter result is normal.

## 2019-10-07 ENCOUNTER — OFFICE VISIT (OUTPATIENT)
Dept: FAMILY MEDICINE CLINIC | Age: 84
End: 2019-10-07

## 2019-10-07 VITALS
HEART RATE: 65 BPM | SYSTOLIC BLOOD PRESSURE: 120 MMHG | WEIGHT: 134.8 LBS | RESPIRATION RATE: 16 BRPM | DIASTOLIC BLOOD PRESSURE: 52 MMHG | BODY MASS INDEX: 30.32 KG/M2 | TEMPERATURE: 97.4 F | OXYGEN SATURATION: 93 % | HEIGHT: 56 IN

## 2019-10-07 DIAGNOSIS — N39.0 RECURRENT UTI: ICD-10-CM

## 2019-10-07 DIAGNOSIS — Z85.3 PERSONAL HISTORY OF BREAST CANCER: ICD-10-CM

## 2019-10-07 DIAGNOSIS — R32 URINARY INCONTINENCE, UNSPECIFIED TYPE: ICD-10-CM

## 2019-10-07 DIAGNOSIS — R15.9 INCONTINENCE OF FECES, UNSPECIFIED FECAL INCONTINENCE TYPE: Primary | ICD-10-CM

## 2019-10-07 DIAGNOSIS — I73.9 PAD (PERIPHERAL ARTERY DISEASE) (HCC): ICD-10-CM

## 2019-10-07 DIAGNOSIS — Z00.00 MEDICARE ANNUAL WELLNESS VISIT, SUBSEQUENT: ICD-10-CM

## 2019-10-07 NOTE — PROGRESS NOTES
HISTORY OF PRESENT ILLNESS  Santiago Fenton is a 80 y.o. female. HPI: recently established care. Last visit said stool leakage. Seen by Dr. Yayo Marlow and was told decrease rectal tone. Could be from prior radiation as personal history of colon cancer. Recent CT/ PET scan showed no acute findings CEA level done. Also was in normal range. No recommendations to repeat colonoscopy. discussed ? Stimulator and daughter will discuss it with Dr. Raymond Weaver. Also urine incontinence. Pelvic floor weakness. For now she is wearing depend and also changing them frequently. Recurrently UTI and now on antibiotic per urology. Sent supply to the pharmacy. Also noted last visit abnormal COLLIN. She was sent to vascular specialist. Review their notes and recommendations. For now holding of any procedure. Risk factor modification. Will observe. She has personal history of breast cancer. Would like to do mammogram yearly as personal satisfaction. Discussed need for screening mammogram and recommendations for screening mammogram. Daughter agree to discuss it before next one is due in July 2020 to stop screening. Visit Vitals  /52 (BP 1 Location: Left arm, BP Patient Position: Sitting)   Pulse 65   Temp 97.4 °F (36.3 °C) (Oral)   Resp 16   Ht 4' 8\" (1.422 m)   Wt 134 lb 12.8 oz (61.1 kg)   SpO2 93%   BMI 30.22 kg/m²     Review medication list, vitals, problem list,allergies. Sitting comfortable. Not in an acute distress. Also not having any specific concern. Review labs.    Lab Results   Component Value Date/Time    WBC 7.4 03/11/2019 01:28 PM    HGB 14.5 03/11/2019 01:28 PM    HCT 45.8 (H) 03/11/2019 01:28 PM    PLATELET 556 (L) 24/75/8863 01:28 PM    MCV 94.6 03/11/2019 01:28 PM     Lab Results   Component Value Date/Time    Sodium 142 03/11/2019 01:28 PM    Potassium 4.7 03/11/2019 01:28 PM    Chloride 106 03/11/2019 01:28 PM    CO2 28 03/11/2019 01:28 PM    Anion gap 8 03/11/2019 01:28 PM    Glucose 154 (H) 03/11/2019 01:28 PM    BUN 23 (H) 03/11/2019 01:28 PM    Creatinine 0.93 03/11/2019 01:28 PM    BUN/Creatinine ratio 25 (H) 03/11/2019 01:28 PM    GFR est AA >60 03/11/2019 01:28 PM    GFR est non-AA 56 (L) 03/11/2019 01:28 PM    Calcium 9.3 03/11/2019 01:28 PM    Bilirubin, total 0.6 03/11/2019 01:28 PM    AST (SGOT) 19 03/11/2019 01:28 PM    Alk. phosphatase 73 03/11/2019 01:28 PM    Protein, total 6.7 03/11/2019 01:28 PM    Albumin 3.9 03/11/2019 01:28 PM    Globulin 2.8 03/11/2019 01:28 PM    A-G Ratio 1.4 03/11/2019 01:28 PM    ALT (SGPT) 22 03/11/2019 01:28 PM     Lab Results   Component Value Date/Time    Cholesterol, total 135 03/11/2019 01:28 PM    HDL Cholesterol 52 03/11/2019 01:28 PM    LDL, calculated 46.8 03/11/2019 01:28 PM    VLDL, calculated 36.2 03/11/2019 01:28 PM    Triglyceride 181 (H) 03/11/2019 01:28 PM    CHOL/HDL Ratio 2.6 03/11/2019 01:28 PM     Lab Results   Component Value Date/Time    TSH 1.80 03/11/2019 01:28 PM         ROS: Denies any headache, dizziness, no chest pain or trouble breathing, no arm or leg weakness. No nausea or vomiting, no weight or appetite changes, no mood changes , no palpitation, no diaphoresis. No abdominal pain. No cold or cough. No leg swelling. No fever. Physical Exam   Constitutional: She is oriented to person, place, and time. No distress. Neck: No thyromegaly present. Cardiovascular: Normal rate, regular rhythm and normal heart sounds. Pulmonary/Chest:   CTA   Abdominal: Soft. Bowel sounds are normal. There is no tenderness. Musculoskeletal: She exhibits no edema. Lymphadenopathy:     She has no cervical adenopathy. Neurological: She is oriented to person, place, and time. Psychiatric: Her behavior is normal.       ASSESSMENT and PLAN    ICD-10-CM ICD-9-CM    1. Incontinence of feces, unspecified fecal incontinence type: for now will follow specialist recommendations. R15.9 787.60     lack of rectal tone. seen Dr. Eliezer Baker .  prior colon cancer and radiation might be the cause. ? stimulator to improve rectal tone. 2. Medicare annual wellness visit, subsequent Z00.00 V70.0    3. Recurrent UTI: following UTI. On antibiotic per their recommendations. N39.0 599.0    4. PAD (peripheral artery disease) ([facility] [address]): observe for now. I73.9 443.9     abnormal COLLIN. seen vascular. conservative management. 5. Urinary incontinence, unspecified type R32 788.30 brief disposable (DEPEND EASY FIT UNDERGARMENTS) misc      Incontinence Pad, Liner, Disp pads    follwoing Dr. Jimmie Solis   6. Personal history of breast cancer: like to get yearly mammogram. Discussed recommendations regarding screening. Will review again once time to repeat mammogram in 2020 July. See HPI. Daughter agrees with it. Z85.3 V10.3    Pt understood and agree with the plan   Review    Follow-up and Dispositions    · Return in about 4 months (around 2/7/2020).

## 2019-10-07 NOTE — PATIENT INSTRUCTIONS
Medicare Part B Preventive Services Limitations Recommendation Scheduled   Bone Mass Measurement  (age 72 & older, biennial) A bone mass density test is recommended when a woman turns 65 to screen for osteoporosis. This test is only recommended one time, as a screening. Some providers will use this same test as a disease monitoring tool if you already have osteoporosis. Not indicated    Cardiovascular Screening Blood Tests (every 5 years)  Total cholesterol, HDL, Triglycerides and ECG Order blood work  as a panel if possible and adults with routine risk  an electrocardiogram (ECG) at intervals determined by your doctor. 03/11/19    Colorectal Cancer Screening  -Fecal occult blood test (annual)  -Flexible sigmoidoscopy (5y)  -Screening colonoscopy (10y)  -Barium Enema Colorectal cancer screenings should be done for adults age 54-65 with no increased risk factors for colorectal cancer. There are a number of acceptable methods of screening for this type of cancer. Each test has its own benefits and drawbacks. Discuss with your doctor what is most appropriate for you during your annual wellness visit. The different tests include: colonoscopy (considered the best screening method), a fecal occult blood test, a fecal DNA test, and sigmoidoscopy.  Not indicated    Counseling to Prevent Tobacco Use (up to 8 sessions per year)  - Counseling greater than 3 and up to 10 minutes  - Counseling greater than 10 minutes Patients must be asymptomatic of tobacco-related conditions to receive as preventive service Not indicated    Diabetes Screening Tests (at least every 3 years, Medicare covers annually or at 6-month intervals for prediabetic patients)    Fasting blood sugar (FBS) or glucose tolerance test (GTT) -All adults age 38-68 who are overweight should have a diabetes screening test once every three years.   -Other screening tests and preventive services for persons with diabetes include: an eye exam to screen for diabetic retinopathy, a kidney function test, a foot exam, and stricter control over your cholesterol.  03/11/19    Diabetes Self-Management Training (DSMT) (no USPSTF recommendation) Requires referral by treating physician for patient with diabetes or renal disease. 10 hours of initial DSMT session of no less than 30 minutes each in a continuous 12-month period. 2 hours of follow-up DSMT in subsequent years. Not indicated    Glaucoma Screening (no USPSTF recommendation) Diabetes mellitus, family history, , age 48 or over,  American, age 72 or over Due Following ophthalmology routinely , monthly. Will obtain records. Human Immunodeficiency Virus (HIV) Screening (annually for increased risk patients)  HIV-1 and HIV-2 by EIA, CATHY, rapid antibody test, or oral mucosa transudate Patient must be at increased risk for HIV infection per USPSTF guidelines or pregnant. Tests covered annually for patients at increased risk. Pregnant patients may receive up to 3 test during pregnancy. Not indicated    Medical Nutrition Therapy (MNT) (for diabetes or renal disease not recommended schedule) Requires referral by treating physician for patient with diabetes or renal disease. Can be provided in same year as diabetes self-management training (DSMT), and CMS recommends medical nutrition therapy take place after DSMT. Up to 3 hours for initial year and 2 hours in subsequent years. Not indicated    Shingles Vaccination A shingles vaccine is also recommended once in a lifetime after age 61 Completed    Seasonal Influenza Vaccination (annually) All adults should have a flu vaccine yearly  9/16/19    Pneumococcal Vaccination (once after 72) All adults over the age of 72 should receive the recommended pneumonia vaccines. Current USPSTF guidelines recommend a series of two vaccines for the best pneumonia protection.   Completed    Hepatitis B Vaccinations (if medium/high risk) Medium/high risk factors:  End-stage renal disease,  Hemophiliacs who received Factor VIII or IX concentrates, Clients of institutions for the mentally retarded, Persons who live in the same house as a HepB virus carrier, Homosexual men, Illicit injectable drug abusers. Not indicated    Screening Mammography (biennial age 54-69) Breast cancer screenings are recommended every other year for women of normal risk, age 54-69. Not indicated Due to personal history of breast cancer  Would like to get mammogram.   Had one done in July 2019 so will think over it to get it done next year    Screening Pap Tests and Pelvic Examination (up to age 79 and after 79 if unknown history or abnormal study last 10 years) Cervical cancer screenings for women over age 72 are only recommended with certain risk factors Not indicated    Hepatitis C All adults born between [birth year] and 1965 should be screened once  Not indicated      Tetanus  All adults should have a tetanus vaccine every 10 years NI  If she gets hurt need to take at that time.

## 2019-10-07 NOTE — PROGRESS NOTES
This is the Subsequent Medicare Annual Wellness Exam, performed 12 months or more after the Initial AWV or the last Subsequent AWV    I have reviewed the patient's medical history in detail and updated the computerized patient record. History     Past Medical History:   Diagnosis Date    A-fib [facility])     Arthritis     CAD (coronary artery disease)     Cancer ([facility] [address]     breast and colon    Colon cancer ([facility] [address] 2007    Coronary stent patent 2016    GERD (gastroesophageal reflux disease)     High cholesterol     Hypertension     Pacemaker 12/2018    Peripheral neuropathy     hands    Urinary incontinence       Past Surgical History:   Procedure Laterality Date    ABDOMEN SURGERY PROC UNLISTED  2006    colon resection with anastomosis    HX APPENDECTOMY      HX BREAST BIOPSY Left 2006    lumpectomy    HX CARPAL TUNNEL RELEASE Left     HX CORONARY [address], 09/09/2016    HX HYSTERECTOMY      HX OTHER SURGICAL      bladder surgeries x3    HX PACEMAKER PLACEMENT  12/07/2018     Current Outpatient Medications   Medication Sig Dispense Refill    mirabegron ER (MYRBETRIQ) 25 mg ER tablet Take 1 Tab by mouth daily. 90 Tab 3    loperamide (IMODIUM) 2 mg capsule TAKE 1 CAPSULE BY MOUTH ONCE A DAY AS NEEDED FOR DIARRHEA FOR UP TO 10 DAYS  3    B infantis/B ani/B lorene/B bifid (PROBIOTIC 4X PO) Take  by mouth.  cranberry extract 650 mg cap Take 1 Cap by mouth.  ketoconazole (NIZORAL) 2 % shampoo Use as directed daily prn 1 Bottle 5    celecoxib (CELEBREX) 100 mg capsule Take  by mouth daily.       amLODIPine (NORVASC) 10 mg tablet 10 mg.      apixaban (ELIQUIS) 2.5 mg tablet 2.5 mg.      losartan (COZAAR) 50 mg tablet 25 mg.      sotalol (BETAPACE) 80 mg tablet 40 mg.      rosuvastatin (CRESTOR) 10 mg tablet 10 mg.      PREMARIN 0.625 mg/gram vaginal cream APPLY A SMALL AMOUNT TO AFFECTED AREA EVERY NIGHT AS DIRECTED 30 g 3    clobetasol 0.05 % spry APPLY TO AFFECTED AREA EVERY NIGHTAT BEDTIME FOR 2 WEEKS THEN AS NEEDED TO SCALP  2    nitroglycerin (NITROSTAT) 0.4 mg SL tablet One tab sublingual every 5 min for relief of pain x 3 as necessary. Report to ER if pain lasts longer than 30 min      VIT C/VIT E AC/LUT/COPPER/ZINC (PRESERVISION LUTEIN PO) Take  by mouth.  albuterol (PROVENTIL HFA, VENTOLIN HFA, PROAIR HFA) 90 mcg/actuation inhaler Take 2 Puffs by inhalation every six (6) hours as needed for Wheezing.  1 Inhaler 3    DYMISTA 137-50 mcg/spray spry INSTILL 1 SPRAY TO EACH NOSTRIL TWICE A DAY  12     Allergies   Allergen Reactions    Novocain [Procaine] Swelling    Sulfa (Sulfonamide Antibiotics) Rash     Family History   Problem Relation Age of Onset    Stroke Mother     Heart Disease Father         chf    Cancer Sister      Social History     Tobacco Use    Smoking status: Former Smoker     Types: Cigarettes     Last attempt to quit: 1983     Years since quittin.2    Smokeless tobacco: Never Used   Substance Use Topics    Alcohol use: Yes     Frequency: Monthly or less     Drinks per session: 1 or 2     Binge frequency: Never     Comment: rare     Patient Active Problem List   Diagnosis Code    Bulge of cervical disc without myelopathy M50.20    Bulge of thoracic disc without myelopathy M51.24    Bulge of lumbar disc without myelopathy M51.26    Cervical spondylosis without myelopathy M47.812    Thoracic spondylosis without myelopathy M47.814    Lumbosacral spondylosis without myelopathy M47.817    Spinal stenosis of lumbar region M48.061    Cervical neuritis M54.12    Lumbar neuritis M54.16    DDD (degenerative disc disease), cervical M50.30    DDD (degenerative disc disease), thoracic M51.34    DDD (degenerative disc disease), lumbar M51.36    Coronary artery disease involving native coronary artery of native heart without angina pectoris I25.10    Recurrent UTI N39.0    DDD (degenerative disc disease), lumbosacral M51.37    Essential hypertension I10    Other cervical disc displacement, unspecified cervical region M50.20    Other intervertebral disc displacement, thoracic region M51.24    Other intervertebral disc displacement, lumbar region M51.26    Spondylosis without myelopathy or radiculopathy, cervical region M47.812    Personal history of colon cancer Z85.038    Incontinence of feces R15.9    Atrial fibrillation (HCC) I48.91    Overactive bladder N32.81       Depression Risk Factor Screening:     3 most recent PHQ Screens 9/16/2019   Little interest or pleasure in doing things Not at all   Feeling down, depressed, irritable, or hopeless Not at all   Total Score PHQ 2 0     Alcohol Risk Factor Screening: You do not drink alcohol or very rarely. Functional Ability and Level of Safety:   Hearing Loss  Hearing is good. The patient wears hearing aids (bilateral)    Activities of Daily Living  The home contains: handrails, grab bars and shower chair and raised toilet  Patient needs help with:  transportation, shopping, managing money and walking- uses a rollator    Fall Risk  Fall Risk Assessment, last 12 mths 9/16/2019   Able to walk? Yes   Fall in past 12 months? No   Fall with injury? -   Number of falls in past 12 months -   Fall Risk Score -       Abuse Screen  Patient is not abused    Cognitive Screening   Evaluation of Cognitive Function:  Has your family/caregiver stated any concerns about your memory: no  Normal    Patient Care Team   Patient Care Team:  Wilbert Gresham MD as PCP - General (Family Practice)  Gavin Jaramillo MD (Cardiology)  Hazel Recio MD (Gynecology)  Ck Flanagan (Physician Assistant)    Assessment/Plan   Education and counseling provided:  Are appropriate based on today's review and evaluation  Review nurses note and assessment. Agree with that. Discussed 5 years health plan and given copy in AVS.  Discussed advance directive. Pt has health power of  - her daughter.  Also she will provide a copy of her advance directive. No changes made since it is done. See ACP note from today. Diagnoses and all orders for this visit:    1.  Medicare annual wellness visit, subsequent        Health Maintenance Due   Topic Date Due    DTaP/Tdap/Td series (1 - Tdap) 04/08/1947    GLAUCOMA SCREENING Q2Y  04/08/1991    MEDICARE YEARLY EXAM  07/26/2018

## 2019-10-07 NOTE — PROGRESS NOTES
1. Have you been to the ER, urgent care clinic since your last visit? Hospitalized since your last visit? No    2. Have you seen or consulted any other health care providers outside of the [address] [place] [address] since your last visit? Include any pap smears or colon screening.  Dr. [facility] LOV: 8/08/19 and 9/23/19    Chief Complaint   Patient presents with   Alka Joy Annual Wellness Visit

## 2019-10-07 NOTE — ACP (ADVANCE CARE PLANNING)
Advance Care Planning (ACP) Provider Note - Comprehensive     Date of ACP Conversation: 10/07/19  Persons included in Conversation:  patient  Length of ACP Conversation in minutes:  <16 minutes (Non-Billable)    Authorized Decision Maker (if patient is incapable of making informed decisions): This person is:  daughter             General ACP for ALL Patients with Decision Making Capacity:   has health power of  and advance directive     Review of Existing Advance Directive:  has advance directive and health power of  . daughter has provided copy but if needed will get copy again.      For Serious or Chronic Illness:  see above     Interventions Provided:  see above

## 2019-10-16 ENCOUNTER — HOSPITAL ENCOUNTER (OUTPATIENT)
Dept: LAB | Age: 84
Discharge: HOME | End: 2019-10-16
Payer: MEDICARE

## 2019-10-16 ENCOUNTER — OFFICE VISIT (OUTPATIENT)
Dept: FAMILY MEDICINE CLINIC | Age: 84
End: 2019-10-16

## 2019-10-16 VITALS
BODY MASS INDEX: 29.92 KG/M2 | OXYGEN SATURATION: 95 % | SYSTOLIC BLOOD PRESSURE: 124 MMHG | HEART RATE: 81 BPM | HEIGHT: 56 IN | TEMPERATURE: 98.6 F | DIASTOLIC BLOOD PRESSURE: 76 MMHG | WEIGHT: 133 LBS | RESPIRATION RATE: 16 BRPM

## 2019-10-16 DIAGNOSIS — N39.0 CHRONIC UTI: ICD-10-CM

## 2019-10-16 DIAGNOSIS — D69.6 THROMBOCYTOPENIA (HCC): Primary | ICD-10-CM

## 2019-10-16 DIAGNOSIS — R53.83 FATIGUE, UNSPECIFIED TYPE: Primary | ICD-10-CM

## 2019-10-16 DIAGNOSIS — R53.83 FATIGUE, UNSPECIFIED TYPE: ICD-10-CM

## 2019-10-16 LAB
ANION GAP SERPL CALC-SCNC: 5 MMOL/L (ref 3–18)
BUN SERPL-MCNC: 25 MG/DL (ref 7–18)
BUN/CREAT SERPL: 18 (ref 12–20)
CALCIUM SERPL-MCNC: 10 MG/DL (ref 8.5–10.1)
CHLORIDE SERPL-SCNC: 105 MMOL/L (ref 100–111)
CO2 SERPL-SCNC: 31 MMOL/L (ref 21–32)
CREAT SERPL-MCNC: 1.41 MG/DL (ref 0.6–1.3)
ERYTHROCYTE [DISTWIDTH] IN BLOOD BY AUTOMATED COUNT: 13 % (ref 11.6–14.5)
GLUCOSE SERPL-MCNC: 174 MG/DL (ref 74–99)
HCT VFR BLD AUTO: 47.7 % (ref 35–45)
HGB BLD-MCNC: 15.8 G/DL (ref 12–16)
MCH RBC QN AUTO: 30.7 PG (ref 24–34)
MCHC RBC AUTO-ENTMCNC: 33.1 G/DL (ref 31–37)
MCV RBC AUTO: 92.6 FL (ref 74–97)
PLATELET # BLD AUTO: 120 K/UL (ref 135–420)
PMV BLD AUTO: 12.4 FL (ref 9.2–11.8)
POTASSIUM SERPL-SCNC: 4.6 MMOL/L (ref 3.5–5.5)
RBC # BLD AUTO: 5.15 M/UL (ref 4.2–5.3)
SODIUM SERPL-SCNC: 141 MMOL/L (ref 136–145)
WBC # BLD AUTO: 7 K/UL (ref 4.6–13.2)

## 2019-10-16 PROCEDURE — 85027 COMPLETE CBC AUTOMATED: CPT

## 2019-10-16 PROCEDURE — 36415 COLL VENOUS BLD VENIPUNCTURE: CPT

## 2019-10-16 PROCEDURE — 80048 BASIC METABOLIC PNL TOTAL CA: CPT

## 2019-10-16 NOTE — PROGRESS NOTES
Chief Complaint   Patient presents with    Other     excessive sleepiness per daughter slept 19 hours between Sat night and Sun morning    Fatigue    Bladder Infection     still on ABX- concerned about these multiple UTI's could be causing the fatigue

## 2019-10-16 NOTE — PROGRESS NOTES
She does have decline in renal function. Also elevated glucose. For now checking HBA1C. Noted low platelet. Little bit elevated HCT. For now will send BMP result to urology office and sending her to hematology. I have also sent a connect care message to her urology . I have spoke to daughter regarding plan as well.

## 2019-10-16 NOTE — PATIENT INSTRUCTIONS
Fatigue: Care Instructions  Your Care Instructions    Fatigue is a feeling of tiredness, exhaustion, or lack of energy. You may feel fatigue because of too much or not enough activity. It can also come from stress, lack of sleep, boredom, and poor diet. Many medical problems, such as viral infections, can cause fatigue. Emotional problems, especially depression, are often the cause of fatigue. Fatigue is most often a symptom of another problem. Treatment for fatigue depends on the cause. For example, if you have fatigue because you have a certain health problem, treating this problem also treats your fatigue. If depression or anxiety is the cause, treatment may help. Follow-up care is a key part of your treatment and safety. Be sure to make and go to all appointments, and call your doctor if you are having problems. It's also a good idea to know your test results and keep a list of the medicines you take. How can you care for yourself at home? · Get regular exercise. But don't overdo it. Go back and forth between rest and exercise. · Get plenty of rest.  · Eat a healthy diet. Do not skip meals, especially breakfast.  · Reduce your use of caffeine, tobacco, and alcohol. Caffeine is most often found in coffee, tea, cola drinks, and chocolate. · Limit medicines that can cause fatigue. This includes tranquilizers and cold and allergy medicines. When should you call for help? Watch closely for changes in your health, and be sure to contact your doctor if:    · You have new symptoms such as fever or a rash.     · Your fatigue gets worse.     · You have been feeling down, depressed, or hopeless. Or you may have lost interest in things that you usually enjoy.     · You are not getting better as expected. Where can you learn more? Go to http://radha-abdiaziz.info/. Enter S929 in the search box to learn more about \"Fatigue: Care Instructions. \"  Current as of: June 26, 2019  Content Version: 12.2  © 5270-5098 [employer]. Care instructions adapted under license by [employer] (which disclaims liability or warranty for this information). If you have questions about a medical condition or this instruction, always ask your healthcare professional. [address] any warranty or liability for your use of this information.

## 2019-10-16 NOTE — PROGRESS NOTES
HISTORY OF PRESENT ILLNESS  Noni Lebron is a 80 y.o. female. HPI; here with daughter with concern of slept longer hours. Said last week missed dinner 3 times in a week. Living in independent living facility. She has been getting treated for recurrent and chronic UTI by urology. She is having incontinence. No abdominal pain. No fever. She is not confused. Able to do her ADL but just slept longer hours for couple of days. No sings of depression or anxiety. She is sitting comfortable. She is looking happy and not overly worried. Her daughter calls her daily and also helps her with her need. No back pain or any more than usual trouble walking. She has been following urology recommendations. No diarrhea. Imodium helping. Seen colorectal specialist as well. CEA wnl. Noted from the chart back in march TSH was wnl. CBC showed no signs of anemia. Also renal functions were fairly stable. Per daughter concern  Recheck cbc and BMP as prolong UTI. I have discussed that recurrent UTI can cause fatigue . Could be the reason. Will be following urology recommendations. Also observe for now and advised pt to keep curtain open when she wakes up. Stay up to date with time and date and day etc.   Weather is changing and watch for sun downing effect . Visit Vitals  /76 (BP 1 Location: Left arm, BP Patient Position: Sitting)   Pulse 81   Temp 98.6 °F (37 °C) (Oral)   Resp 16   Ht 4' 8\" (1.422 m)   Wt 133 lb (60.3 kg)   SpO2 95%   BMI 29.82 kg/m²     Review medication list, vitals, problem list,allergies.    Lab Results   Component Value Date/Time    WBC 7.0 10/16/2019 02:30 PM    HGB 15.8 10/16/2019 02:30 PM    HCT 47.7 (H) 10/16/2019 02:30 PM    PLATELET 522 (L) 81/81/0738 02:30 PM    MCV 92.6 10/16/2019 02:30 PM     Lab Results   Component Value Date/Time    Sodium 141 10/16/2019 02:30 PM    Potassium 4.6 10/16/2019 02:30 PM    Chloride 105 10/16/2019 02:30 PM    CO2 31 10/16/2019 02:30 PM    Anion gap 5 10/16/2019 02:30 PM    Glucose 174 (H) 10/16/2019 02:30 PM    BUN 25 (H) 10/16/2019 02:30 PM    Creatinine 1.41 (H) 10/16/2019 02:30 PM    BUN/Creatinine ratio 18 10/16/2019 02:30 PM    GFR est AA 42 (L) 10/16/2019 02:30 PM    GFR est non-AA 35 (L) 10/16/2019 02:30 PM    Calcium 10.0 10/16/2019 02:30 PM    Bilirubin, total 0.6 03/11/2019 01:28 PM    AST (SGOT) 19 03/11/2019 01:28 PM    Alk. phosphatase 73 03/11/2019 01:28 PM    Protein, total 6.7 03/11/2019 01:28 PM    Albumin 3.9 03/11/2019 01:28 PM    Globulin 2.8 03/11/2019 01:28 PM    A-G Ratio 1.4 03/11/2019 01:28 PM    ALT (SGPT) 22 03/11/2019 01:28 PM     Lab Results   Component Value Date/Time    TSH 1.80 03/11/2019 01:28 PM   she is taking vitamin D supplement,. Denies any unusual joint pain or swelling. Using walker to ambulate. No chest pain or sob. No headache or dizziness, no nausea or vomiting . no abdominal pain. No mood changes. Sleep as above. No cough or cold. No wheezing. No back pain. No ext weakness. No chest pain or sob. She has no known thyroid problem. ROS: see HPI     Physical Exam   Constitutional: She is oriented to person, place, and time. No distress. Neurological: She is oriented to person, place, and time. Psychiatric: Her behavior is normal. Thought content normal.       ASSESSMENT and PLAN    ICD-10-CM ICD-9-CM    1. Fatigue, unspecified type: for now observe. Eat on time. Encourage to go down to eat so she can have social time. R53.83 780.79 CBC W/O DIFF   2. Chronic UTI: recurrent UTI could make her feel fatigue. Will observe. For now imodium is helping for diarrhea. A90.8 149.0 METABOLIC PANEL, BASIC   Pt understood and agree with the plan   Review hM   Follow-up and Dispositions    · Return if symptoms worsen or fail to improve.

## 2019-11-21 ENCOUNTER — OFFICE VISIT (OUTPATIENT)
Dept: SURGERY | Age: 84
End: 2019-11-21

## 2019-11-21 VITALS
OXYGEN SATURATION: 94 % | RESPIRATION RATE: 16 BRPM | HEART RATE: 73 BPM | HEIGHT: 56 IN | WEIGHT: 132 LBS | BODY MASS INDEX: 29.69 KG/M2 | SYSTOLIC BLOOD PRESSURE: 145 MMHG | TEMPERATURE: 97 F | DIASTOLIC BLOOD PRESSURE: 74 MMHG

## 2019-11-21 DIAGNOSIS — R15.9 INCONTINENCE OF FECES, UNSPECIFIED FECAL INCONTINENCE TYPE: Primary | ICD-10-CM

## 2019-11-21 NOTE — PROGRESS NOTES
Subjective: Dramatic improvement incontinence with one Imodium daily. Denies any leakage. CEA level is 2. Last colonoscopy was 10 years ago and was normal.    Past medical history and ROS were reviewed and unchanged. Exam deferred    Assessment / Plan    Fecal incontinence, improved with Imodium  Continue indefinitely    History of colon cancer  CEA level is normal and last colonoscopy 10 years ago was normal  Given age would not recommend anything further, patient and daughter understand the limitation but except this given her age    A total of 15 minutes was spent with the patient, with >50% of time spent on counseling and coordination of care. The diagnoses and plan were discussed with patient. All questions answered. Plan of care agreed to by all concerned.

## 2020-02-07 ENCOUNTER — OFFICE VISIT (OUTPATIENT)
Dept: FAMILY MEDICINE CLINIC | Age: 85
End: 2020-02-07

## 2020-02-07 ENCOUNTER — HOSPITAL ENCOUNTER (OUTPATIENT)
Dept: LAB | Age: 85
Discharge: HOME | End: 2020-02-07
Payer: MEDICARE

## 2020-02-07 VITALS
BODY MASS INDEX: 28.97 KG/M2 | OXYGEN SATURATION: 93 % | DIASTOLIC BLOOD PRESSURE: 50 MMHG | SYSTOLIC BLOOD PRESSURE: 108 MMHG | RESPIRATION RATE: 16 BRPM | HEIGHT: 56 IN | TEMPERATURE: 97.4 F | WEIGHT: 128.8 LBS | HEART RATE: 69 BPM

## 2020-02-07 DIAGNOSIS — R53.83 OTHER FATIGUE: ICD-10-CM

## 2020-02-07 DIAGNOSIS — R15.2 INCONTINENCE OF FECES WITH FECAL URGENCY: ICD-10-CM

## 2020-02-07 DIAGNOSIS — R15.9 INCONTINENCE OF FECES WITH FECAL URGENCY: ICD-10-CM

## 2020-02-07 DIAGNOSIS — N28.9 RENAL INSUFFICIENCY: ICD-10-CM

## 2020-02-07 DIAGNOSIS — R26.89 BALANCE PROBLEM: ICD-10-CM

## 2020-02-07 DIAGNOSIS — N39.0 RECURRENT UTI: Primary | ICD-10-CM

## 2020-02-07 DIAGNOSIS — I48.91 ATRIAL FIBRILLATION, UNSPECIFIED TYPE (HCC): ICD-10-CM

## 2020-02-07 LAB
25(OH)D3 SERPL-MCNC: 52.5 NG/ML (ref 30–100)
ERYTHROCYTE [DISTWIDTH] IN BLOOD BY AUTOMATED COUNT: 13.8 % (ref 11.6–14.5)
HCT VFR BLD AUTO: 44.8 % (ref 35–45)
HGB BLD-MCNC: 14.8 G/DL (ref 12–16)
MCH RBC QN AUTO: 30.2 PG (ref 24–34)
MCHC RBC AUTO-ENTMCNC: 33 G/DL (ref 31–37)
MCV RBC AUTO: 91.4 FL (ref 74–97)
PLATELET # BLD AUTO: 144 K/UL (ref 135–420)
PMV BLD AUTO: 12.5 FL (ref 9.2–11.8)
RBC # BLD AUTO: 4.9 M/UL (ref 4.2–5.3)
WBC # BLD AUTO: 6.7 K/UL (ref 4.6–13.2)

## 2020-02-07 PROCEDURE — 85027 COMPLETE CBC AUTOMATED: CPT

## 2020-02-07 PROCEDURE — 36415 COLL VENOUS BLD VENIPUNCTURE: CPT

## 2020-02-07 PROCEDURE — 82306 VITAMIN D 25 HYDROXY: CPT

## 2020-02-07 RX ORDER — CONJUGATED ESTROGENS 0.62 MG/G
CREAM VAGINAL
Qty: 30 G | Refills: 3 | Status: SHIPPED | OUTPATIENT
Start: 2020-02-07 | End: 2020-09-15 | Stop reason: SDUPTHER

## 2020-02-07 RX ORDER — ALBUTEROL SULFATE 90 UG/1
2 AEROSOL, METERED RESPIRATORY (INHALATION)
Qty: 1 INHALER | Refills: 3 | Status: SHIPPED | OUTPATIENT
Start: 2020-02-07

## 2020-02-07 NOTE — PROGRESS NOTES
1. Have you been to the ER, urgent care clinic since your last visit? Hospitalized since your last visit? No    2. Have you seen or consulted any other health care providers outside of the [address] [place] [address] since your last visit? Include any pap smears or colon screening. Nephrology Dr. Brice Shade: 1/22/2020. [facility] LOV: 9/2019.     Chief Complaint   Patient presents with    Fatigue    Bladder Infection

## 2020-02-07 NOTE — PATIENT INSTRUCTIONS
Female Urinary Tract: Anatomy Sketch    Current as of: February 19, 2019  Content Version: 12.2  © 3738-5238 [employer]. Care instructions adapted under license by [employer] (which disclaims liability or warranty for this information). If you have questions about a medical condition or this instruction, always ask your healthcare professional. [place] [address] any warranty or liability for your use of this information.

## 2020-02-08 NOTE — PROGRESS NOTES
HISTORY OF PRESENT ILLNESS  Ayanna Santos is a 80 y.o. female. HPI: here for follow up . Accompanied with her daughter. Hard of hearing. Wearing hearing aid. Per daughter will be making an appt with her ENT specialist.   Recurrent UTI. Following urology and also now seen nephrology per urology recommendations. Now plan for ultrasound. Will be following  Their recommendations. She has prior h/o bladder sling. Denies any urinary incontinence. No aga blood. Currently no abdominal pain. No dysuria. No nausea or vomiting. No fever. Sitting comfortable without any acute distress. Also review lab result   Noted high H & H. She was sent to hematology. Somehow specialist office suppose to call her daughter for an appt but did not happen. Doing referral again. Also some concern with balance. She has chronic back discomfort. No radiculopathy symptoms at this time. Using support. No recent fall. Feeling chronic fatigue. ? Recurrent UTI. No known thyroid problem . Said she is sleeping long hours. No signs of depression per daughter. Review labs. Lab Results   Component Value Date/Time    WBC 6.7 02/07/2020 12:41 PM    HGB 14.8 02/07/2020 12:41 PM    HCT 44.8 02/07/2020 12:41 PM    PLATELET 539 92/04/1475 12:41 PM    MCV 91.4 02/07/2020 12:41 PM     Lab Results   Component Value Date/Time    Sodium 142 10/31/2019 02:48 PM    Potassium 4.2 10/31/2019 02:48 PM    Chloride 105 10/31/2019 02:48 PM    CO2 26 10/31/2019 02:48 PM    Anion gap 5 10/16/2019 02:30 PM    Glucose 165 (H) 10/31/2019 02:48 PM    BUN 18 10/31/2019 02:48 PM    Creatinine 1.29 (H) 10/31/2019 02:48 PM    BUN/Creatinine ratio 14 10/31/2019 02:48 PM    GFR est AA 41 (L) 10/31/2019 02:48 PM    GFR est non-AA 36 (L) 10/31/2019 02:48 PM    Calcium 10.3 10/31/2019 02:48 PM    Bilirubin, total 0.6 03/11/2019 01:28 PM    AST (SGOT) 19 03/11/2019 01:28 PM    Alk.  phosphatase 73 03/11/2019 01:28 PM    Protein, total 6.7 03/11/2019 01:28 PM Albumin 3.9 03/11/2019 01:28 PM    Globulin 2.8 03/11/2019 01:28 PM    A-G Ratio 1.4 03/11/2019 01:28 PM    ALT (SGPT) 22 03/11/2019 01:28 PM     Lab Results   Component Value Date/Time    Cholesterol, total 135 03/11/2019 01:28 PM    HDL Cholesterol 52 03/11/2019 01:28 PM    LDL, calculated 46.8 03/11/2019 01:28 PM    VLDL, calculated 36.2 03/11/2019 01:28 PM    Triglyceride 181 (H) 03/11/2019 01:28 PM    CHOL/HDL Ratio 2.6 03/11/2019 01:28 PM     Lab Results   Component Value Date/Time    TSH 1.80 03/11/2019 01:28 PM       Lab Results   Component Value Date/Time    Vitamin D 25-Hydroxy 52.5 02/07/2020 12:41 PM       Has h/o a.fib. asymptomatic. On anticoagulation. Following cardiology. Had fecal incontinence. Seen Dr Jessica Chan. stable with imodium. Discussed with daughter if it restarts need to evaluate for c.diff. She understood it well. No aga blood in stool per patient. ROS: see HPI     Physical Exam  Constitutional:       General: She is not in acute distress. Cardiovascular:      Rate and Rhythm: Normal rate and regular rhythm. Heart sounds: Normal heart sounds. Abdominal:      General: Bowel sounds are normal.      Palpations: Abdomen is soft. Tenderness: There is no abdominal tenderness. Neurological:      Mental Status: She is oriented to person, place, and time. Psychiatric:         Behavior: Behavior normal.         ASSESSMENT and PLAN    ICD-10-CM ICD-9-CM    1. Recurrent UTI: will be following urology and nephrology recommendations. Plan for an ultrasound. N39.0 599.0     prior h/o bladder sling twice    2. Renal insufficiency: following Specialist . See HPI  N28.9 593.9    3. Incontinence of feces with fecal urgency: if gets worse or reoccurs will consider evaluation for c.diff  R15.9 787.63     R15.2      stable on imodium    4. Atrial fibrillation, unspecified type ([facility]): stable. Asymptomatic. I48.91 427.31    5.  Balance problem: for now will consider PT/ OT eval. Done referral.  R26.89 781.99 REFERRAL TO PHYSICAL THERAPY   6. Other fatigue: obtain labs. R53.83 780.79 CBC W/O DIFF      VITAMIN D, 25 HYDROXY   Pt understood and agree with the plan   Review HM   Follow-up and Dispositions    · Return in about 2 months (around 4/7/2020).

## 2020-02-10 NOTE — PROGRESS NOTES
Vitamin D is wnl. Also H & H is wnl. Still keep an appt with hematology as elevated serum protein in prior labs.

## 2020-02-20 NOTE — PROGRESS NOTES
Spoke with patient's daughter and she verbalized understanding of Vit D wnl and H & H is wnl.  Was unsure about the referral to hematology for elevated serum protein

## 2020-03-26 ENCOUNTER — TELEPHONE (OUTPATIENT)
Dept: VASCULAR SURGERY | Age: 85
End: 2020-03-26

## 2020-03-26 NOTE — TELEPHONE ENCOUNTER
Contacted patient in regards to upcoming appointments. Spoke with daughter Shakir Doan. Due to COVID 19, we will be rescheduling patient's appointments out to a later date. Patient is to call with any new symptoms or concerns. Patient is in agreement with plan.

## 2020-08-26 RX ORDER — LOPERAMIDE HYDROCHLORIDE 2 MG/1
2 CAPSULE ORAL
Qty: 10 CAP | Refills: 0 | Status: SHIPPED | OUTPATIENT
Start: 2020-08-26 | End: 2020-09-10

## 2020-08-26 NOTE — TELEPHONE ENCOUNTER
This patient contacted office for the following prescriptions to be filled:    Medication requested :   Requested Prescriptions     Pending Prescriptions Disp Refills    loperamide (IMODIUM) 2 mg capsule   3     PCP: Jose Chavarria or Print:  [facility]   Mail order or [facility]     Scheduled appointment if not seen by current providers in office: [address] [place] [address] 2/7/2020 f/u 9/15/2020

## 2020-09-10 NOTE — TELEPHONE ENCOUNTER
This patient contacted office for the following prescriptions to be filled:    Medication requested :   Requested Prescriptions     Pending Prescriptions Disp Refills    loperamide (IMODIUM) 2 mg capsule [Pharmacy Med Name: LOPERAMIDE 2MG CAP] 90 Cap 0     Sig: TAKE 1 CAPSULE BY MOUTH ONCE DAILY AS NEEDED FOR DIARRHEA     PCP: Jose Chavarria or Print: [address]  Mail order or [facility] [address] [facility]     Scheduled appointment if not seen by current providers in office:  LOV 2/7/2020  F/u 9/15/2020

## 2020-09-11 RX ORDER — LOPERAMIDE HYDROCHLORIDE 2 MG/1
CAPSULE ORAL
Qty: 90 CAP | Refills: 0 | Status: SHIPPED | OUTPATIENT
Start: 2020-09-11 | End: 2020-09-15 | Stop reason: SDUPTHER

## 2020-09-14 ENCOUNTER — TELEPHONE (OUTPATIENT)
Dept: FAMILY MEDICINE CLINIC | Age: 85
End: 2020-09-14

## 2020-09-14 NOTE — TELEPHONE ENCOUNTER
Have you been diagnosed with, tested for, or told that you are suspected of having COVID-19 (coronovirus)? Last month both negative   Have you had a fever or taken medication to treat a fever in the past 72 hours? No  Have you had a cough, SOB, or flu-like symptoms within the past 3 days? No   Have you had direct contact with someone who tested positive for COVID-19 within the past 14 days? No   Do you have a household member with flu-like symptoms, including fever, cough, or SOB? No   Do you currently have flu-like symptoms including fever, cough, or SOB?  No

## 2020-09-15 ENCOUNTER — OFFICE VISIT (OUTPATIENT)
Dept: FAMILY MEDICINE CLINIC | Age: 85
End: 2020-09-15

## 2020-09-15 VITALS
TEMPERATURE: 98.1 F | RESPIRATION RATE: 16 BRPM | HEART RATE: 70 BPM | DIASTOLIC BLOOD PRESSURE: 60 MMHG | OXYGEN SATURATION: 98 % | BODY MASS INDEX: 28.11 KG/M2 | SYSTOLIC BLOOD PRESSURE: 140 MMHG | WEIGHT: 125.4 LBS

## 2020-09-15 DIAGNOSIS — R39.89 URETHRAL PAIN: ICD-10-CM

## 2020-09-15 DIAGNOSIS — N39.0 RECURRENT UTI: ICD-10-CM

## 2020-09-15 DIAGNOSIS — R15.1 FECAL SMEARING: ICD-10-CM

## 2020-09-15 DIAGNOSIS — I10 ESSENTIAL HYPERTENSION: ICD-10-CM

## 2020-09-15 DIAGNOSIS — M54.16 LUMBAR RADICULOPATHY: ICD-10-CM

## 2020-09-15 DIAGNOSIS — Z23 NEEDS FLU SHOT: Primary | ICD-10-CM

## 2020-09-15 DIAGNOSIS — N32.81 OVERACTIVE BLADDER: ICD-10-CM

## 2020-09-15 RX ORDER — LOPERAMIDE HYDROCHLORIDE 2 MG/1
2 CAPSULE ORAL DAILY
Qty: 90 CAP | Refills: 1 | Status: SHIPPED | OUTPATIENT
Start: 2020-09-15 | End: 2021-02-12

## 2020-09-15 RX ORDER — CONJUGATED ESTROGENS 0.62 MG/G
CREAM VAGINAL
Qty: 30 G | Refills: 3 | Status: SHIPPED | OUTPATIENT
Start: 2020-09-15 | End: 2021-10-11 | Stop reason: SDUPTHER

## 2020-09-15 NOTE — PROGRESS NOTES
1. Have you been to the ER, urgent care clinic since your last visit? Hospitalized since your last visit? No    2. Have you seen or consulted any other health care providers outside of the [address] [place] [address] since your last visit? Include any pap smears or colon screening.  No    Chief Complaint   Patient presents with    Irregular Heart Beat    Fatigue    Other     Balance and lren    Hand Pain     bilat hand pain

## 2020-09-16 NOTE — PROGRESS NOTES
HISTORY OF PRESENT ILLNESS  Rosenda Soto is a 80 y.o. female. HPI: Here with her daughter for a routine visit. History of recurrent UTI, urethral discomfort, overactive bladder. Been following urology. Also been following OB/GYN. Currently was put on chronic antibiotic for prevention. If she becomes symptomatic advised her to double up the dose for 7 days. She understands that very well. During the visit she is sitting comfortable and did not appear in any acute distress. She been taking hormone supplement due to the urethral discomfort and was recommended by urology. Advised daughter to have a further refill with the specialist office. She understood it well. History of fecal incontinence. No stool. Has seen Dr. Chantale Rowland and recommended Imodium. Given refill on her medication. It is been fairly stable. Advised her to hold the medication if she feels constipation and she can take Metamucil as needed as well. Sitting comfortable. Did not appear in any acute distress. Denies any concern. No headache or dizziness. No chest pain or trouble breathing. No palpitation or diaphoresis. No mood changes. She is having a hard time hearing even with the hearing aid. Today she was going to have a an evaluation regarding it. Complaining of bilateral hand tingling, numbness. She is wearing a wrist support at nighttime. Per daughter she had an evaluation in the past.  She was told it was neuropathy. Untreated carpal tunnel syndrome. Advised symptomatic treatment with ice application. Also wrist support during daytime she can wear as well. Will observe at this time with symptomatic treatment. History of hypertension. She is taking medication with compliance. No side effects. Today blood pressure mildly elevated but is she is asymptomatic. Again sitting comfortable and did not appear in any acute distress.       Visit Vitals  BP (!) 140/60 (BP 1 Location: Left arm, BP Patient Position: Sitting)   Pulse 70   Temp 98.1 °F (36.7 °C) (Oral)   Resp 16   Wt 125 lb 6.4 oz (56.9 kg)   SpO2 98%   BMI 28.11 kg/m²     Review medication list, vitals, problem list,allergies. Lab Results   Component Value Date/Time    WBC 6.7 02/07/2020 12:41 PM    HGB 14.8 02/07/2020 12:41 PM    HCT 44.8 02/07/2020 12:41 PM    PLATELET 306 02/20/8686 12:41 PM    MCV 91.4 02/07/2020 12:41 PM     Lab Results   Component Value Date/Time    Sodium 142 10/31/2019 02:48 PM    Potassium 4.2 10/31/2019 02:48 PM    Chloride 105 10/31/2019 02:48 PM    CO2 26 10/31/2019 02:48 PM    Anion gap 5 10/16/2019 02:30 PM    Glucose 165 (H) 10/31/2019 02:48 PM    BUN 18 10/31/2019 02:48 PM    Creatinine 1.29 (H) 10/31/2019 02:48 PM    BUN/Creatinine ratio 14 10/31/2019 02:48 PM    GFR est AA 41 (L) 10/31/2019 02:48 PM    GFR est non-AA 36 (L) 10/31/2019 02:48 PM    Calcium 10.3 10/31/2019 02:48 PM    Bilirubin, total 0.6 03/11/2019 01:28 PM    Alk. phosphatase 73 03/11/2019 01:28 PM    Protein, total 6.7 03/11/2019 01:28 PM    Albumin 3.9 03/11/2019 01:28 PM    Globulin 2.8 03/11/2019 01:28 PM    A-G Ratio 1.4 03/11/2019 01:28 PM    ALT (SGPT) 22 03/11/2019 01:28 PM    AST (SGOT) 19 03/11/2019 01:28 PM     Lab Results   Component Value Date/Time    Cholesterol, total 135 03/11/2019 01:28 PM    HDL Cholesterol 52 03/11/2019 01:28 PM    LDL, calculated 46.8 03/11/2019 01:28 PM    VLDL, calculated 36.2 03/11/2019 01:28 PM    Triglyceride 181 (H) 03/11/2019 01:28 PM    CHOL/HDL Ratio 2.6 03/11/2019 01:28 PM       Lab Results   Component Value Date/Time    TSH 1.80 03/11/2019 01:28 PM           ROS: See HPI    Physical Exam  HENT:      Ears:      Comments: Has worn hearing aids bilaterally  Cardiovascular:      Rate and Rhythm: Normal rate. Pulmonary:      Effort: No respiratory distress. Breath sounds: No wheezing or rales. Abdominal:      Tenderness: There is no abdominal tenderness. Musculoskeletal:         General: No swelling. Lymphadenopathy:      Cervical: No cervical adenopathy. Neurological:      Mental Status: She is alert and oriented to person, place, and time. Psychiatric:         Behavior: Behavior normal.         ASSESSMENT and PLAN    ICD-10-CM ICD-9-CM    1. Needs flu shot  Z23 V04.81 FLU (FLUAD QUAD INFLUENZA VACCINE,QUAD,ADJUVANTED)   2. Recurrent UTI: For now she is on Macrobid daily dose. Will be following urology recommendation N39.0 599.0    3. Fecal smearing: Fairly stable on Imodium. Advised to  hold it if it is constipation R15.1 787.62    4. Overactive bladder  N32.81 596.51    5. Essential hypertension:well controlled. Continue current dose of medication and low salt diet. Exercise as tolerated. I10 401.9    6. Lumbar radiculopathy: It has been stable at this time. Also advised that symptomatic treatment with Tylenol. Avoid NSAIDs. heating pad. She used to be on physical therapy to improve the strength but currently due to over the situation it was put on hold. Now discussed that if she wants to restart and third choice as pandemic situation and there was always a risk to expose. She understood and she will think about making a decision to restart PT/OT M54.16 724.4    7. Urethral pain  R39.89 788.99    Patient understood and agree with the plan. Review health maintenance.

## 2020-10-01 ENCOUNTER — TELEPHONE (OUTPATIENT)
Dept: FAMILY MEDICINE CLINIC | Age: 85
End: 2020-10-01

## 2020-10-01 NOTE — TELEPHONE ENCOUNTER
Ariella in [address] [place] [address] is requesting an order for pt to have physical therapy for balance and strengthening please send order. Thank you.

## 2020-10-02 NOTE — TELEPHONE ENCOUNTER
It was given to Kevin Raya few days back and again couple of days ago. I can sign a new form which I have a duplicate one that can be fax again. Thanks.

## 2020-10-15 ENCOUNTER — HOSPITAL ENCOUNTER (OUTPATIENT)
Dept: MAMMOGRAPHY | Age: 85
Discharge: HOME | End: 2020-10-15
Attending: FAMILY MEDICINE
Payer: MEDICARE

## 2020-10-15 DIAGNOSIS — Z12.31 VISIT FOR SCREENING MAMMOGRAM: ICD-10-CM

## 2020-10-15 PROCEDURE — 77063 BREAST TOMOSYNTHESIS BI: CPT

## 2020-11-03 RX ORDER — LOSARTAN POTASSIUM 25 MG/1
25 TABLET ORAL DAILY
Qty: 90 TAB | Refills: 1 | Status: SHIPPED | OUTPATIENT
Start: 2020-11-03 | End: 2021-04-30

## 2020-11-03 NOTE — TELEPHONE ENCOUNTER
This patient contacted office for the following prescriptions to be filled:    Medication requested :   Requested Prescriptions     Pending Prescriptions Disp Refills    losartan (COZAAR) 50 mg tablet        Sig: Take 0.5 Tabs by mouth daily. PCP: Jose Chavarria or Print: [address]  [facility]     Scheduled appointment if not seen by current providers in office: LOV 9/15/2020 f/u 3/9/2021 pt is not seeing cardiology anymore.

## 2020-11-04 NOTE — TELEPHONE ENCOUNTER
Please let pt know that as she was taking half tab of blood pressure medication I have changed the dose to 25mg instead of 50 mg.

## 2021-02-12 RX ORDER — LOPERAMIDE HYDROCHLORIDE 2 MG/1
CAPSULE ORAL
Qty: 90 CAP | Refills: 1 | Status: SHIPPED | OUTPATIENT
Start: 2021-02-12 | End: 2021-08-20 | Stop reason: SDUPTHER

## 2021-03-09 ENCOUNTER — OFFICE VISIT (OUTPATIENT)
Dept: FAMILY MEDICINE CLINIC | Age: 86
End: 2021-03-09
Payer: MEDICARE

## 2021-03-09 VITALS
DIASTOLIC BLOOD PRESSURE: 60 MMHG | BODY MASS INDEX: 27.44 KG/M2 | OXYGEN SATURATION: 97 % | HEART RATE: 76 BPM | SYSTOLIC BLOOD PRESSURE: 114 MMHG | TEMPERATURE: 98 F | HEIGHT: 56 IN | RESPIRATION RATE: 16 BRPM | WEIGHT: 122 LBS

## 2021-03-09 DIAGNOSIS — I73.9 PAD (PERIPHERAL ARTERY DISEASE) (HCC): ICD-10-CM

## 2021-03-09 DIAGNOSIS — M79.641 PAIN OF RIGHT HAND: ICD-10-CM

## 2021-03-09 DIAGNOSIS — H35.3211 EXUDATIVE AGE-RELATED MACULAR DEGENERATION, RIGHT EYE, WITH ACTIVE CHOROIDAL NEOVASCULARIZATION (HCC): Primary | ICD-10-CM

## 2021-03-09 DIAGNOSIS — I25.119 CORONARY ARTERY DISEASE INVOLVING NATIVE CORONARY ARTERY OF NATIVE HEART WITH ANGINA PECTORIS (HCC): ICD-10-CM

## 2021-03-09 DIAGNOSIS — E78.5 DYSLIPIDEMIA: ICD-10-CM

## 2021-03-09 DIAGNOSIS — I48.91 ATRIAL FIBRILLATION, UNSPECIFIED TYPE (HCC): ICD-10-CM

## 2021-03-09 DIAGNOSIS — M54.16 LUMBAR RADICULOPATHY: ICD-10-CM

## 2021-03-09 DIAGNOSIS — Z85.038 PERSONAL HISTORY OF COLON CANCER: ICD-10-CM

## 2021-03-09 DIAGNOSIS — N39.0 RECURRENT UTI: ICD-10-CM

## 2021-03-09 DIAGNOSIS — D69.6 THROMBOCYTOPENIA, UNSPECIFIED (HCC): ICD-10-CM

## 2021-03-09 DIAGNOSIS — J44.9 CHRONIC OBSTRUCTIVE PULMONARY DISEASE, UNSPECIFIED COPD TYPE (HCC): ICD-10-CM

## 2021-03-09 DIAGNOSIS — R15.1 FECAL SMEARING: ICD-10-CM

## 2021-03-09 DIAGNOSIS — Z00.00 MEDICARE ANNUAL WELLNESS VISIT, SUBSEQUENT: ICD-10-CM

## 2021-03-09 DIAGNOSIS — R41.3 MEMORY CHANGES: ICD-10-CM

## 2021-03-09 PROCEDURE — 1100F PTFALLS ASSESS-DOCD GE2>/YR: CPT | Performed by: FAMILY MEDICINE

## 2021-03-09 PROCEDURE — G8510 SCR DEP NEG, NO PLAN REQD: HCPCS | Performed by: FAMILY MEDICINE

## 2021-03-09 PROCEDURE — G8419 CALC BMI OUT NRM PARAM NOF/U: HCPCS | Performed by: FAMILY MEDICINE

## 2021-03-09 PROCEDURE — 1090F PRES/ABSN URINE INCON ASSESS: CPT | Performed by: FAMILY MEDICINE

## 2021-03-09 PROCEDURE — G8428 CUR MEDS NOT DOCUMENT: HCPCS | Performed by: FAMILY MEDICINE

## 2021-03-09 PROCEDURE — G0463 HOSPITAL OUTPT CLINIC VISIT: HCPCS | Performed by: FAMILY MEDICINE

## 2021-03-09 PROCEDURE — G8536 NO DOC ELDER MAL SCRN: HCPCS | Performed by: FAMILY MEDICINE

## 2021-03-09 PROCEDURE — 99214 OFFICE O/P EST MOD 30 MIN: CPT | Performed by: FAMILY MEDICINE

## 2021-03-09 PROCEDURE — 3288F FALL RISK ASSESSMENT DOCD: CPT | Performed by: FAMILY MEDICINE

## 2021-03-09 PROCEDURE — G0439 PPPS, SUBSEQ VISIT: HCPCS | Performed by: FAMILY MEDICINE

## 2021-03-09 RX ORDER — DULOXETIN HYDROCHLORIDE 30 MG/1
30 CAPSULE, DELAYED RELEASE ORAL DAILY
Qty: 30 CAP | Refills: 1 | Status: SHIPPED | OUTPATIENT
Start: 2021-03-09 | End: 2021-03-29

## 2021-03-09 NOTE — ACP (ADVANCE CARE PLANNING)
Advance Care Planning     General Advance Care Planning (ACP) Conversation      Date of Conversation: 3/9/2021  Conducted with: Patient with Decision Making Capacity    Healthcare Decision Maker:     Primary Decision Maker: Angela Bashir - [phone]  Click here to complete [place] including selection of the Healthcare Decision Maker Relationship (ie \"Primary\")  Today we She has an advanced directive which per her daughter has provided the copy but I do not see it scanned. Nurse to look for it and I have advised daughter to bring the copy again. No recent change in advance care directive. Also has a power of  and advised daughter to bring the both copies.     Content/Action Overview:   See above      Length of Voluntary ACP Conversation in minutes:  <16 minutes (Non-Billable)    Sophie Erazo MD

## 2021-03-09 NOTE — PATIENT INSTRUCTIONS

## 2021-03-09 NOTE — PROGRESS NOTES
HISTORY OF PRESENT ILLNESS  Brenda Ortiz is a 80 y.o. female. HPI: Here for follow-up  Recurrent UTI. Been following urology. Currently asymptomatic. Noted yesterday urine culture been sent. UA did not show any acute signs of infection. She is on daily Macrobid dose. Tolerating it well. She had poor control and having fecal smear. Seen Dr. Clay Daniel. Advised to take Imodium and that has been helping. Patient has no concern regarding that. Concern of worsening of paresthesia over the right hand. Untreated carpal tunnel. Has taken gabapentin in the past and gave her to sleepiness. She does not want to take it again. Also has bilateral leg pain. History of PVD and also radiculopathy left more than right. Discussed the Cymbalta. Discussed the side effects. Agreed to start it. If no improvement will consider hand specialist referral.  She agrees with it. She is concerned with her memory. Has been seeing the therapist as the assisted living facility. She was discharged from that as was told age appropriate changes on the memory. Currently will observe. If needed consider neurology referral.  Patient and daughter both understood and agree  Vitals been stable. Taking medication with compliance and no side effects. Having a trouble reading due to macular degeneration. That has been little part for brain fog. Will observe at this time. She is supposed to go back to the vascular specialist for possible angiogram if the leg pain would not improve. Also recommended arterial study and carotid duplex in 6 months from last visit. She is overdue for follow-up. Daughter will make a follow-up appointment with them. Having angiogram would be tricky as she had complications from anesthesia and daughter is concerned regarding that. As given with her age discussed the conservative management and for now patient and daughter both agrees at this time. History of atrial fibrillation.   On anticoagulation. Following cardiology and the recommendations. Visit Vitals  /60 (BP 1 Location: Left arm, BP Patient Position: Sitting, BP Cuff Size: Adult)   Pulse 76   Temp 98 °F (36.7 °C) (Oral)   Resp 16   Ht 4' 8\" (1.422 m)   Wt 122 lb (55.3 kg)   SpO2 97%   BMI 27.35 kg/m²     Review medication list, vitals, problem list,allergies. Lab Results   Component Value Date/Time    WBC 6.7 02/07/2020 12:41 PM    HGB 14.8 02/07/2020 12:41 PM    HCT 44.8 02/07/2020 12:41 PM    PLATELET 547 10/54/3224 12:41 PM    MCV 91.4 02/07/2020 12:41 PM     Lab Results   Component Value Date/Time    Sodium 142 10/31/2019 02:48 PM    Potassium 4.2 10/31/2019 02:48 PM    Chloride 105 10/31/2019 02:48 PM    CO2 26 10/31/2019 02:48 PM    Anion gap 5 10/16/2019 02:30 PM    Glucose 165 (H) 10/31/2019 02:48 PM    BUN 18 10/31/2019 02:48 PM    Creatinine 1.29 (H) 10/31/2019 02:48 PM    BUN/Creatinine ratio 14 10/31/2019 02:48 PM    GFR est AA 41 (L) 10/31/2019 02:48 PM    GFR est non-AA 36 (L) 10/31/2019 02:48 PM    Calcium 10.3 10/31/2019 02:48 PM    Bilirubin, total 0.6 03/11/2019 01:28 PM    Alk. phosphatase 73 03/11/2019 01:28 PM    Protein, total 6.7 03/11/2019 01:28 PM    Albumin 3.9 03/11/2019 01:28 PM    Globulin 2.8 03/11/2019 01:28 PM    A-G Ratio 1.4 03/11/2019 01:28 PM    ALT (SGPT) 22 03/11/2019 01:28 PM    AST (SGOT) 19 03/11/2019 01:28 PM     Lab Results   Component Value Date/Time    Cholesterol, total 135 03/11/2019 01:28 PM    HDL Cholesterol 52 03/11/2019 01:28 PM    LDL, calculated 46.8 03/11/2019 01:28 PM    VLDL, calculated 36.2 03/11/2019 01:28 PM    Triglyceride 181 (H) 03/11/2019 01:28 PM    CHOL/HDL Ratio 2.6 03/11/2019 01:28 PM     Lab Results   Component Value Date/Time    TSH 1.80 03/11/2019 01:28 PM       Lab Results   Component Value Date/Time    Vitamin D 25-Hydroxy 52.5 02/07/2020 12:41 PM           ROS    Physical Exam  Constitutional:       General: She is not in acute distress.   Neck: Musculoskeletal: Neck supple. Cardiovascular:      Rate and Rhythm: Normal rate and regular rhythm. Heart sounds: Normal heart sounds. Abdominal:      General: Bowel sounds are normal.      Palpations: Abdomen is soft. Tenderness: There is no abdominal tenderness. Musculoskeletal:         General: No swelling. Neurological:      Mental Status: She is oriented to person, place, and time. Psychiatric:         Behavior: Behavior normal.         ASSESSMENT and PLAN    ICD-10-CM ICD-9-CM    1. Medicare annual wellness visit, subsequent  Z00.00 V70.0    2. Exudative age-related macular degeneration, right eye, with active choroidal neovascularization ([facility]): Been following ophthalmology with compliance H35.3211 362.52      362.16    3. Chronic obstructive pulmonary disease, unspecified COPD type ([facility]): Stable. No increased use of albuterol. No cold cough or wheezing J44.9 496    4. PAD (peripheral artery disease) [facility]): Leg claudication no rest pain. Bilateral leg pain left more than right. Also lumbar radiculopathy. At this time she is overdue for vascular follow-up. Advised to make a follow-up appointment. She will be at high risk for angiogram due to prior anesthesia complications. At this time I have advised to have a follow-up with specialist and as per recommendation arterial study and carotid duplex can be done. Will follow specialist recommendation I73.9 443.9    5. Atrial fibrillation, unspecified type [facility]): Asymptomatic. On anticoagulation. Will continue cardiology recommendation I48.91 427.31 CBC W/O DIFF      METABOLIC PANEL, COMPREHENSIVE      TSH 3RD GENERATION   6. Thrombocytopenia, unspecified ([facility]): Last labs showed platelets within normal limit. We will repeat the labs D69.6 287.5 CBC W/O DIFF   7. Coronary artery disease involving native coronary artery of native heart with angina pectoris [facility]): No anginal symptoms.   On risk factor management I25.119 414.01 LIPID PANEL 413.9    8. Personal history of colon cancer  Z85.038 V10.05    9. Lumbar radiculopathy: Consider starting Cymbalta as she did not tolerate the gabapentin. Also will help for the right hand paresthesia due to carpal tunnel. Has seen the neurology in the past and EMG was showed nerve damage per her daughter. Discussed the side effect of medication and will follow-up in a month M54.16 724.4 DULoxetine (CYMBALTA) 30 mg capsule   10. Fecal smearing: Seeing the specialist and currently stable on Imodium R15.1 787.62 TSH 3RD GENERATION    seen Dr. Frantz Salas. given imodium. helping/    11. Recurrent UTI: On daily Macrobid. Following urology. Still pending culture result which was sent from their office yesterday N39.0 599.0    12. Dyslipidemia: Lifestyle modification E78.5 272.4 LIPID PANEL   13. Pain of right hand: Nerve damage from the carpal tunnel. Seen neurology in the past.  EMG was done. Did not tolerate much gabapentin. Will give trial of Cymbalta M79.641 729.5 DULoxetine (CYMBALTA) 30 mg capsule    parasthesia rt hand. untreated carpal tunnel syndrome    14. Memory changes: Age-related. Has done counseling/therapy at the assisted living facility. Was released due to findings appropriate for age. She has macular degeneration and that gives her poor vision that prevents her from reading and doing any other activity which might be making little difference. We will observe at this time and if any changes will consider neurology referral R41.3 780.93    Patient understood and agreed with the plan  Review health maintenance  Follow-up and Dispositions    · Return in about 1 month (around 4/9/2021). Please note that this dictation was completed with [employer], the computer voice recognition software. Quite often unanticipated grammatical, syntax, homophones, and other interpretive errors are inadvertently transcribed by the computer software. Please disregard these errors.   Please excuse any errors that have escaped final proofreading.

## 2021-03-09 NOTE — PROGRESS NOTES
This is the Subsequent Medicare Annual Wellness Exam, performed 12 months or more after the Initial AWV or the last Subsequent AWV    I have reviewed the patient's medical history in detail and updated the computerized patient record. Depression Risk Factor Screening:     3 most recent PHQ Screens 9/15/2020   Little interest or pleasure in doing things Not at all   Feeling down, depressed, irritable, or hopeless Not at all   Total Score PHQ 2 0       Alcohol Risk Screen    Do you average more than 1 drink per night or more than 7 drinks a week:  No    On any one occasion in the past three months have you have had more than 3 drinks containing alcohol:  No        Functional Ability and Level of Safety:    Hearing: Hearing is good. The patient wears hearing aids. Activities of Daily Living: The home contains: handrails, grab bars and shower chair and raised toilet  Patient needs help with:  transportation, shopping, preparing meals, housework, managing money and walking      Ambulation: with difficulty, uses a walker     Fall Risk:  Fall Risk Assessment, last 12 mths 9/15/2020   Able to walk? Yes   Fall in past 12 months? Yes   Number of falls in past 12 months 2   Fall with injury? 0      Abuse Screen:  Patient is not abused       Cognitive Screening    Has your family/caregiver stated any concerns about your memory: yes - Age appropriate in memory loss     Cognitive Screening:      Assessment/Plan   Education and counseling provided:  Are appropriate based on today's review and evaluation  Discussed prior health plan  Discussed advance care directive  See ACP note from today    Diagnoses and all orders for this visit:    1.  Medicare annual wellness visit, subsequent        Health Maintenance Due     Health Maintenance Due   Topic Date Due    COVID-19 Vaccine (1 of 2) Never done    DTaP/Tdap/Td series (1 - Tdap) Never done    GLAUCOMA SCREENING Q2Y  Never done    Lipid Screen  10/28/2020   Ordered the labs  She has been following ophthalmology every month due to macular degeneration.   Will obtain the notes  Patient and daughter both agreed to hold off on Tdap but instructed if any acute injury bed wound she should get the Tdap from urgent care  She has received her both Covid vaccine    Patient Care Team   Patient Care Team:  Tesha Mercer MD as PCP - General (Family Medicine)  Tesha Mercer MD as PCP - [facility] Empaneled Provider  Ken Busby MD (Cardiology)  Silvia Cabrera MD (Gynecology)  Chantell Crowleyma (Physician Assistant)  Stefanie Daugherty MD (Urology)  Jaren Scott MD (Ophthalmology)  Alexa Parham MD as Physician (Nephrology)    History     Patient Active Problem List   Diagnosis Code    Bulge of cervical disc without myelopathy M50.20    Bulge of thoracic disc without myelopathy M51.24    Bulge of lumbar disc without myelopathy M51.26    Cervical spondylosis without myelopathy M47.812    Thoracic spondylosis without myelopathy M47.814    Lumbosacral spondylosis without myelopathy M47.817    Spinal stenosis of lumbar region M48.061    Cervical neuritis M54.12    Lumbar neuritis M54.16    DDD (degenerative disc disease), cervical M50.30    DDD (degenerative disc disease), thoracic M51.34    DDD (degenerative disc disease), lumbar M51.36    Coronary artery disease involving native coronary artery of native heart without angina pectoris I25.10    Recurrent UTI N39.0    DDD (degenerative disc disease), lumbosacral M51.37    Essential hypertension I10    Other cervical disc displacement, unspecified cervical region M50.20    Other intervertebral disc displacement, thoracic region M51.24    Other intervertebral disc displacement, lumbar region M51.26    Spondylosis without myelopathy or radiculopathy, cervical region M47.812    Personal history of colon cancer Z85.038    Incontinence of feces R15.9    Atrial fibrillation ([facility] [address] I48.91    Overactive bladder N32.81     Past Medical History:   Diagnosis Date    A-fib [facility])     Arthritis     Breast cancer ([facility] [address] 2002    left breast    CAD (coronary artery disease)     Cancer ([facility] [address]     breast and colon    Colon cancer ([facility] [address] 2007    Coronary stent patent 2016    GERD (gastroesophageal reflux disease)     High cholesterol     Hypertension     Pacemaker 12/2018    Peripheral neuropathy     hands    Urinary incontinence       Past Surgical History:   Procedure Laterality Date    HX APPENDECTOMY      HX BREAST BIOPSY Left 2006    lumpectomy    HX CARPAL TUNNEL RELEASE Left     HX CORONARY [address], 09/09/2016    HX HYSTERECTOMY      HX OTHER SURGICAL      bladder surgeries x3    HX PACEMAKER PLACEMENT  12/07/2018    [place] ABDOMEN SURGERY PROC UNLISTED  2006    colon resection with anastomosis     Current Outpatient Medications   Medication Sig Dispense Refill    loperamide (IMODIUM) 2 mg capsule TAKE 1 CAPSULE BY MOUTH DAILY. 90 Cap 1    nitrofurantoin, macrocrystal-monohydrate, (MACROBID) 100 mg capsule Take 1 Cap by mouth two (2) times a day. 14 Cap 1    cefdinir (OMNICEF) 300 mg capsule Take 1 Cap by mouth two (2) times a day. 14 Cap 0    nitrofurantoin, macrocrystal-monohydrate, (MACROBID) 100 mg capsule Take 1 Cap by mouth daily. 90 Cap 0    amoxicillin-clavulanate (AUGMENTIN) 250-125 mg per tablet Take 1 Tab by mouth two (2) times a day. 14 Tab 0    losartan (COZAAR) 25 mg tablet Take 1 Tab by mouth daily. 90 Tab 1    OTHER [facility]      Premarin 0.625 mg/gram vaginal cream APPLY A SMALL AMOUNT TO AFFECTED AREA EVERY NIGHT AS DIRECTED 30 g 3    erythromycin (ILOTYCIN) ophthalmic ointment APPLY 1/2 INCH TO RIGHT EYE FOUR TIMES A DAY      DERMOTIC OIL 0.01 % drop APPLY 1-2 DROPS PER EAR AS NEEDED      oxybutynin chloride (GELNIQUE) 10 % (100 mg/gram) glpk 1 Packet by TransDERmal route daily.  90 Packet 3    albuterol (PROVENTIL HFA, VENTOLIN HFA, PROAIR HFA) 90 mcg/actuation inhaler Take 2 Puffs by inhalation every six (6) hours as needed for Wheezing. 1 Inhaler 3    calcium polycarbophil (FIBERCON PO) Take  by mouth.  B infantis/B ani/B lorene/B bifid (PROBIOTIC 4X PO) Take  by mouth.  cranberry extract 650 mg cap Take 1 Cap by mouth daily.  ketoconazole (NIZORAL) 2 % shampoo Use as directed daily prn 1 Bottle 5    amLODIPine (NORVASC) 10 mg tablet Take 10 mg by mouth daily.  apixaban (ELIQUIS) 2.5 mg tablet Take 2.5 mg by mouth two (2) times a day.  sotalol (BETAPACE) 80 mg tablet Take 40 mg by mouth daily.  rosuvastatin (CRESTOR) 10 mg tablet Take 10 mg by mouth nightly.  nitroglycerin (NITROSTAT) 0.4 mg SL tablet One tab sublingual every 5 min for relief of pain x 3 as necessary.   Report to ER if pain lasts longer than 30 min       Allergies   Allergen Reactions    Ciprofloxacin Nausea and Vomiting    Doxycycline Nausea and Vomiting    Novocain [Procaine] Swelling    Sulfa (Sulfonamide Antibiotics) Rash       Family History   Problem Relation Age of Onset    Stroke Mother     Heart Disease Father         chf    Cancer Sister      Social History     Tobacco Use    Smoking status: Former Smoker     Types: Cigarettes     Quit date: 1983     Years since quittin.6    Smokeless tobacco: Never Used   Substance Use Topics    Alcohol use: Yes     Frequency: Monthly or less     Drinks per session: 1 or 2     Binge frequency: Never     Comment: francisco Chadwick LPN

## 2021-03-15 ENCOUNTER — HOSPITAL ENCOUNTER (OUTPATIENT)
Dept: LAB | Age: 86
Discharge: HOME | End: 2021-03-15
Payer: MEDICARE

## 2021-03-15 DIAGNOSIS — E78.5 DYSLIPIDEMIA: ICD-10-CM

## 2021-03-15 DIAGNOSIS — D69.6 THROMBOCYTOPENIA, UNSPECIFIED (HCC): ICD-10-CM

## 2021-03-15 DIAGNOSIS — R73.01 IMPAIRED FASTING BLOOD SUGAR: Primary | ICD-10-CM

## 2021-03-15 DIAGNOSIS — I48.91 ATRIAL FIBRILLATION, UNSPECIFIED TYPE (HCC): ICD-10-CM

## 2021-03-15 DIAGNOSIS — R15.1 FECAL SMEARING: ICD-10-CM

## 2021-03-15 DIAGNOSIS — I25.119 CORONARY ARTERY DISEASE INVOLVING NATIVE CORONARY ARTERY OF NATIVE HEART WITH ANGINA PECTORIS (HCC): ICD-10-CM

## 2021-03-15 LAB
ALBUMIN SERPL-MCNC: 3.9 G/DL (ref 3.4–5)
ALBUMIN/GLOB SERPL: 1.2 {RATIO} (ref 0.8–1.7)
ALP SERPL-CCNC: 89 U/L (ref 45–117)
ALT SERPL-CCNC: 18 U/L (ref 13–56)
ANION GAP SERPL CALC-SCNC: 4 MMOL/L (ref 3–18)
AST SERPL-CCNC: 18 U/L (ref 10–38)
BILIRUB SERPL-MCNC: 0.9 MG/DL (ref 0.2–1)
BUN SERPL-MCNC: 13 MG/DL (ref 7–18)
BUN/CREAT SERPL: 15 (ref 12–20)
CALCIUM SERPL-MCNC: 9.8 MG/DL (ref 8.5–10.1)
CHLORIDE SERPL-SCNC: 108 MMOL/L (ref 100–111)
CHOLEST SERPL-MCNC: 146 MG/DL
CO2 SERPL-SCNC: 31 MMOL/L (ref 21–32)
CREAT SERPL-MCNC: 0.88 MG/DL (ref 0.6–1.3)
ERYTHROCYTE [DISTWIDTH] IN BLOOD BY AUTOMATED COUNT: 13.9 % (ref 11.6–14.5)
GLOBULIN SER CALC-MCNC: 3.3 G/DL (ref 2–4)
GLUCOSE SERPL-MCNC: 149 MG/DL (ref 74–99)
HCT VFR BLD AUTO: 47.8 % (ref 35–45)
HDLC SERPL-MCNC: 62 MG/DL (ref 40–60)
HDLC SERPL: 2.4 {RATIO} (ref 0–5)
HGB BLD-MCNC: 15.8 G/DL (ref 12–16)
LDLC SERPL CALC-MCNC: 60.2 MG/DL (ref 0–100)
LIPID PROFILE,FLP: ABNORMAL
MCH RBC QN AUTO: 31.3 PG (ref 24–34)
MCHC RBC AUTO-ENTMCNC: 33.1 G/DL (ref 31–37)
MCV RBC AUTO: 94.7 FL (ref 74–97)
PLATELET # BLD AUTO: 139 K/UL (ref 135–420)
PMV BLD AUTO: 13 FL (ref 9.2–11.8)
POTASSIUM SERPL-SCNC: 4.1 MMOL/L (ref 3.5–5.5)
PROT SERPL-MCNC: 7.2 G/DL (ref 6.4–8.2)
RBC # BLD AUTO: 5.05 M/UL (ref 4.2–5.3)
SODIUM SERPL-SCNC: 143 MMOL/L (ref 136–145)
TRIGL SERPL-MCNC: 119 MG/DL (ref ?–150)
TSH SERPL DL<=0.05 MIU/L-ACNC: 1.86 UIU/ML (ref 0.36–3.74)
VLDLC SERPL CALC-MCNC: 23.8 MG/DL
WBC # BLD AUTO: 6.5 K/UL (ref 4.6–13.2)

## 2021-03-15 PROCEDURE — 36415 COLL VENOUS BLD VENIPUNCTURE: CPT

## 2021-03-15 PROCEDURE — 80061 LIPID PANEL: CPT

## 2021-03-15 PROCEDURE — 84443 ASSAY THYROID STIM HORMONE: CPT

## 2021-03-15 PROCEDURE — 83036 HEMOGLOBIN GLYCOSYLATED A1C: CPT

## 2021-03-15 PROCEDURE — 80053 COMPREHEN METABOLIC PANEL: CPT

## 2021-03-15 PROCEDURE — 85027 COMPLETE CBC AUTOMATED: CPT

## 2021-03-15 NOTE — PROGRESS NOTES
Labs been ok. Mild elevated glucose. I would add A1C to drawn blood. Mild elevated hemoglobin. Will observe for now and discuss further on follow up visit. Advise low carb diet.

## 2021-03-17 LAB
EST. AVERAGE GLUCOSE BLD GHB EST-MCNC: 157 MG/DL
HBA1C MFR BLD: 7.1 % (ref 4.2–5.6)

## 2021-03-19 DIAGNOSIS — E11.9 NEW ONSET TYPE 2 DIABETES MELLITUS (HCC): Primary | ICD-10-CM

## 2021-03-19 NOTE — PROGRESS NOTES
Spoke with patient daughter regarding lab results. New onset diabetes. See orders only encounter and notes. Sooner follow-up in 2 months. Labs to be repeated before appointment. Started Januvia.

## 2021-03-19 NOTE — PROGRESS NOTES
Discussed the lab results with the daughter. Discussed to start Januvia. Discussed side effects. Repeat the BMP in 3 weeks. Also discussed elevated hematocrit and lower normal range of platelet count. She did not tolerate Cymbalta. Felt down and nausea and vomiting. Agreed to stop it. We will follow-up next visit in 2 months.   Daughter is aware that she has to call the office and make that appointment

## 2021-03-29 ENCOUNTER — OFFICE VISIT (OUTPATIENT)
Dept: INTERNAL MEDICINE CLINIC | Age: 86
End: 2021-03-29
Payer: MEDICARE

## 2021-03-29 ENCOUNTER — HOSPITAL ENCOUNTER (OUTPATIENT)
Dept: GENERAL RADIOLOGY | Age: 86
Discharge: HOME | End: 2021-03-29
Payer: MEDICARE

## 2021-03-29 VITALS
RESPIRATION RATE: 12 BRPM | HEART RATE: 77 BPM | DIASTOLIC BLOOD PRESSURE: 54 MMHG | SYSTOLIC BLOOD PRESSURE: 125 MMHG | BODY MASS INDEX: 28.3 KG/M2 | TEMPERATURE: 97.5 F | OXYGEN SATURATION: 97 % | HEIGHT: 56 IN | WEIGHT: 125.8 LBS

## 2021-03-29 DIAGNOSIS — I73.9 PERIPHERAL VASCULAR DISEASE (HCC): ICD-10-CM

## 2021-03-29 DIAGNOSIS — E11.9 NEW ONSET TYPE 2 DIABETES MELLITUS (HCC): Primary | ICD-10-CM

## 2021-03-29 DIAGNOSIS — Z51.81 THERAPEUTIC DRUG MONITORING: ICD-10-CM

## 2021-03-29 PROCEDURE — 99214 OFFICE O/P EST MOD 30 MIN: CPT | Performed by: INTERNAL MEDICINE

## 2021-03-29 PROCEDURE — G0463 HOSPITAL OUTPT CLINIC VISIT: HCPCS | Performed by: INTERNAL MEDICINE

## 2021-03-29 PROCEDURE — 71046 X-RAY EXAM CHEST 2 VIEWS: CPT

## 2021-03-29 PROCEDURE — 3051F HG A1C>EQUAL 7.0%<8.0%: CPT | Performed by: INTERNAL MEDICINE

## 2021-03-29 RX ORDER — CETIRIZINE HYDROCHLORIDE 10 MG/1
10 CAPSULE, LIQUID FILLED ORAL
COMMUNITY
End: 2022-02-16

## 2021-03-29 RX ORDER — ESOMEPRAZOLE MAGNESIUM 40 MG/1
40 CAPSULE, DELAYED RELEASE ORAL DAILY
COMMUNITY
End: 2022-02-16

## 2021-03-29 NOTE — PATIENT INSTRUCTIONS
Report increasing leg pian on ambulating, or development of rest pain, cold or blue toes or feet. Report increased thirst, increased urination or blurry vision, limit intake of simple carbs-- \"white foods\" like bread, rice, pasta, potatoes, sugar and flour.

## 2021-03-29 NOTE — PROGRESS NOTES
Establish Care  Pertinent negatives include no chest pain and no shortness of breath. Lena Mohan is a 80 y.o. female establishing new PCP. Recent dx of diabetes based on A1C 7.1% last week or so. Januvia was rx'd, here with her daughter, who is a nurse, to see if can wait to start med. Admits to not liking food much at [facility], so mostly eating desserts. A new  started there 2 weeks ago, they would like to see if DM can be managed with dietary changes. Does exercise. Dx of PAD with COLLIN 0.55 RLE, 0.41 LLE. May have some intermittent claudication, denies rest pain. Hesitates to have procedure done due to prolonged sedation with anesthesia in past.        Past Medical History:   Diagnosis Date    A-fib [facility])     Arthritis     Breast cancer ([facility] [address] 2002    left breast    CAD (coronary artery disease)     Cancer ([facility] [address]     breast and colon    Colon cancer ([facility] [address] 2007    Coronary stent patent 2016    GERD (gastroesophageal reflux disease)     High cholesterol     Hypertension     Pacemaker 12/2018    Peripheral neuropathy     hands    Urinary incontinence         Past Surgical History:   Procedure Laterality Date    HX APPENDECTOMY      HX BREAST BIOPSY Left 2006    lumpectomy    HX CARPAL TUNNEL RELEASE Left     HX CORONARY [address], 09/09/2016    HX HYSTERECTOMY      HX OTHER SURGICAL      bladder surgeries x3    HX PACEMAKER PLACEMENT  12/07/2018    [place] ABDOMEN SURGERY PROC UNLISTED  2006    colon resection with anastomosis        Current Outpatient Medications   Medication Sig Dispense Refill    esomeprazole (NexIUM) 40 mg capsule Take 40 mg by mouth daily.  Cetirizine (ZyrTEC) 10 mg cap Take 10 mg by mouth.  loperamide (IMODIUM) 2 mg capsule TAKE 1 CAPSULE BY MOUTH DAILY. 90 Cap 1    nitrofurantoin, macrocrystal-monohydrate, (MACROBID) 100 mg capsule Take 1 Cap by mouth daily. 90 Cap 0    losartan (COZAAR) 25 mg tablet Take 1 Tab by mouth daily.  90 Tab 1  OTHER [facility]      Premarin 0.625 mg/gram vaginal cream APPLY A SMALL AMOUNT TO AFFECTED AREA EVERY NIGHT AS DIRECTED 30 g 3    DERMOTIC OIL 0.01 % drop APPLY 1-2 DROPS PER EAR AS NEEDED      calcium polycarbophil (FIBERCON PO) Take  by mouth.  B infantis/B ani/B lorene/B bifid (PROBIOTIC 4X PO) Take  by mouth.  cranberry extract 650 mg cap Take 1 Cap by mouth daily.  ketoconazole (NIZORAL) 2 % shampoo Use as directed daily prn 1 Bottle 5    amLODIPine (NORVASC) 10 mg tablet Take 10 mg by mouth daily.  apixaban (ELIQUIS) 2.5 mg tablet Take 2.5 mg by mouth two (2) times a day.  sotalol (BETAPACE) 80 mg tablet Take 40 mg by mouth daily.  rosuvastatin (CRESTOR) 10 mg tablet Take 10 mg by mouth nightly.  nitroglycerin (NITROSTAT) 0.4 mg SL tablet One tab sublingual every 5 min for relief of pain x 3 as necessary. Report to ER if pain lasts longer than 30 min      SITagliptin (JANUVIA) 100 mg tablet Take 1 Tab by mouth daily. 90 Tab 0    fosfomycin (MONUROL) 3 gram pack oral packet Take 1 Packet by mouth daily. 2 Packet 0    DULoxetine (CYMBALTA) 30 mg capsule Take 1 Cap by mouth daily. 30 Cap 1    nitrofurantoin, macrocrystal-monohydrate, (MACROBID) 100 mg capsule Take 1 Cap by mouth two (2) times a day. 14 Cap 1    cefdinir (OMNICEF) 300 mg capsule Take 1 Cap by mouth two (2) times a day. 14 Cap 0    erythromycin (ILOTYCIN) ophthalmic ointment APPLY 1/2 INCH TO RIGHT EYE FOUR TIMES A DAY      oxybutynin chloride (GELNIQUE) 10 % (100 mg/gram) glpk 1 Packet by TransDERmal route daily. 90 Packet 3    albuterol (PROVENTIL HFA, VENTOLIN HFA, PROAIR HFA) 90 mcg/actuation inhaler Take 2 Puffs by inhalation every six (6) hours as needed for Wheezing.  1 Inhaler 3        Allergies   Allergen Reactions    Ciprofloxacin Nausea and Vomiting    Doxycycline Nausea and Vomiting    Novocain [Procaine] Swelling    Sulfa (Sulfonamide Antibiotics) Rash        Social History Socioeconomic History    Marital status:      Spouse name: Not on file    Number of children: Not on file    Years of education: Not on file    Highest education level: Not on file   Occupational History    Occupation: retired   Social Needs    Financial resource strain: Not on file    Food insecurity     Worry: Not on file     Inability: Not on file   [employer] needs     Medical: Not on file     Non-medical: Not on file   Tobacco Use    Smoking status: Former Smoker     Types: Cigarettes     Quit date: 1983     Years since quittin.7    Smokeless tobacco: Never Used   Substance and Sexual Activity    Alcohol use: Yes     Frequency: Monthly or less     Drinks per session: 1 or 2     Binge frequency: Never     Comment: rare    Drug use: No    Sexual activity: Not Currently   Lifestyle    Physical activity     Days per week: Not on file     Minutes per session: Not on file    Stress: Not on file   Relationships    Social connections     Talks on phone: Not on file     Gets together: Not on file     Attends Confucianist service: Not on file     Active member of club or organization: Not on file     Attends meetings of clubs or organizations: Not on file     Relationship status: Not on file    Intimate partner violence     Fear of current or ex partner: Not on file     Emotionally abused: Not on file     Physically abused: Not on file     Forced sexual activity: Not on file   Other Topics Concern    Not on file   Social History Narrative    Not on file        Family History   Problem Relation Age of Onset    Stroke Mother     Heart Disease Father         chf    Cancer Sister            Visit Vitals  BP (!) 125/54 (BP 1 Location: Left upper arm, BP Patient Position: Sitting)   Pulse 77   Temp 97.5 °F (36.4 °C) (Oral)   Resp 12   Ht 4' 8\" (1.422 m)   Wt 125 lb 12.8 oz (57.1 kg)   SpO2 97%   BMI 28.20 kg/m²        Review of Systems   Constitutional: Negative for chills, diaphoresis and fever. Eyes: Negative for blurred vision. Though hard to tell with macular degeneration   Respiratory: Negative for shortness of breath. Cardiovascular: Negative for chest pain. Gastrointestinal: Negative for blood in stool. No dysgeusia. (+) occasional dysphagia for solids, daughter has eaten with her and states not significant, but will monitor. Denies problems with chewing. Genitourinary: Negative for hematuria. No vaginal bleeding   Neurological: Negative for tingling. Endo/Heme/Allergies: Negative for polydipsia. No polyuria       Physical Exam  Constitutional:       General: She is not in acute distress. Eyes:      General: No scleral icterus. Conjunctiva/sclera: Conjunctivae normal.   Neck:      Musculoskeletal: Neck supple. Cardiovascular:      Rate and Rhythm: Normal rate and regular rhythm. Pulses: Normal pulses. Heart sounds: No friction rub. No gallop. Pulmonary:      Effort: Pulmonary effort is normal.      Breath sounds: Normal breath sounds. Abdominal:      General: Abdomen is flat. Palpations: Abdomen is soft. There is no mass. Tenderness: There is no abdominal tenderness. Musculoskeletal:         General: No swelling. Comments: No clubbing   Skin:     General: Skin is warm and dry. Neurological:      Mental Status: She is alert and oriented to person, place, and time. Psychiatric:         Mood and Affect: Mood normal.                  Diagnoses and all orders for this visit:    1. New onset type 2 diabetes mellitus ([address]  Comments:  limit simple carbs, continue exercise,  A1C at next visit. Hold off on meds for now, discuss starting if A1C rising-- report polyuria/ dipsia, blurred vision. 2. Therapeutic drug monitoring  Comments:  for macrodantin-- q 6 months LFTs-- next due late Sept 2021-- yearly CXR-- given order for latter  Orders:  -     XR CHEST PA LAT; Future    3.  Peripheral vascular disease [facility])  Comments:  reviewed symptoms of intermittent claudication, report developement of, or report rest pain. Continue exercise, control BP, lipids, glucose         Follow-up and Dispositions    · Return in about 3 months (around 6/29/2021) for HgbA1C.               Esteban Baptiste MD

## 2021-03-29 NOTE — PROGRESS NOTES
1. Have you been to the ER, urgent care clinic or hospitalized since your last visit? NO           2. Have you seen or consulted any other health care providers outside of the [address] [place] [address] since your last visit (Include any pap smears or colon screening)? YES, Dr. Blair Watson, Dr. Car Engel    Do you have an Advanced Directive? YES    Would you like information on Advanced Directives?  NO

## 2021-03-30 ENCOUNTER — TELEPHONE (OUTPATIENT)
Dept: INTERNAL MEDICINE CLINIC | Age: 86
End: 2021-03-30

## 2021-03-30 NOTE — TELEPHONE ENCOUNTER
----- Message from Domonique Root MD sent at 3/30/2021  4:56 PM EDT -----  Notify patient normal chest xray results.

## 2021-04-30 RX ORDER — LOSARTAN POTASSIUM 25 MG/1
TABLET ORAL
Qty: 90 TAB | Refills: 1 | Status: SHIPPED | OUTPATIENT
Start: 2021-04-30 | End: 2021-10-22

## 2021-07-19 ENCOUNTER — OFFICE VISIT (OUTPATIENT)
Dept: INTERNAL MEDICINE CLINIC | Age: 86
End: 2021-07-19
Payer: MEDICARE

## 2021-07-19 VITALS
RESPIRATION RATE: 16 BRPM | OXYGEN SATURATION: 98 % | BODY MASS INDEX: 27.53 KG/M2 | WEIGHT: 122.4 LBS | TEMPERATURE: 97.3 F | SYSTOLIC BLOOD PRESSURE: 128 MMHG | HEIGHT: 56 IN | DIASTOLIC BLOOD PRESSURE: 52 MMHG | HEART RATE: 63 BPM

## 2021-07-19 DIAGNOSIS — M79.605 BILATERAL LEG PAIN: ICD-10-CM

## 2021-07-19 DIAGNOSIS — E11.9 NEW ONSET TYPE 2 DIABETES MELLITUS (HCC): Primary | ICD-10-CM

## 2021-07-19 DIAGNOSIS — R20.0 BILATERAL HAND NUMBNESS: ICD-10-CM

## 2021-07-19 DIAGNOSIS — M79.604 BILATERAL LEG PAIN: ICD-10-CM

## 2021-07-19 LAB — HBA1C MFR BLD HPLC: 7.2 %

## 2021-07-19 PROCEDURE — 3051F HG A1C>EQUAL 7.0%<8.0%: CPT | Performed by: INTERNAL MEDICINE

## 2021-07-19 PROCEDURE — G0463 HOSPITAL OUTPT CLINIC VISIT: HCPCS | Performed by: INTERNAL MEDICINE

## 2021-07-19 PROCEDURE — 99213 OFFICE O/P EST LOW 20 MIN: CPT | Performed by: INTERNAL MEDICINE

## 2021-07-19 PROCEDURE — 83036 HEMOGLOBIN GLYCOSYLATED A1C: CPT | Performed by: INTERNAL MEDICINE

## 2021-07-19 RX ORDER — GRANULES FOR ORAL 3 G/1
POWDER ORAL
COMMUNITY
Start: 2021-06-17 | End: 2022-02-16 | Stop reason: ALTCHOICE

## 2021-07-19 NOTE — PROGRESS NOTES
1. Have you been to the ER, urgent care clinic or hospitalized since your last visit? NO.     2. Have you seen or consulted any other health care providers outside of the [address] [place] [address] since your last visit (Include any pap smears or colon screening)?  NO

## 2021-07-19 NOTE — PROGRESS NOTES
HPI     Mame Love returns for follow-up of her recently diagnosed type 2 diabetes. She describes her usual breakfast for me, and it includes a can of Boost every day. I recommended she find a flavor of Glucerna that she enjoys, and substitute this for the Boost.  She complains of longstanding bilateral hand numbness, which her daughter explains has been present due to carpal tunnel syndrome. She had a bad reaction to even mild sedation on placement of her pacemaker (ended up in the ICU due to the reaction) so they were reluctant to proceed with surgical intervention. She tried gabapentin, probably low-dose, for the tingling but ended up sleeping all day. This resolved once the gabapentin was discontinued. She also complains of leg pain with ambulation, which stops when she stops.   She has known peripheral arterial disease, but again they are reluctant to proceed with any invasive procedures due to her severe reaction to anesthesia in the past.        Past Medical History:   Diagnosis Date    A-fib [facility])     Arthritis     Breast cancer ([facility] [address] 2002    left breast    CAD (coronary artery disease)     Cancer ([facility] [address]     breast and colon    Colon cancer ([facility] [address] 2007    Coronary stent patent 2016    GERD (gastroesophageal reflux disease)     High cholesterol     Hypertension     Pacemaker 12/2018    Peripheral neuropathy     hands    Urinary incontinence         Past Surgical History:   Procedure Laterality Date    HX APPENDECTOMY      HX BREAST BIOPSY Left 2006    lumpectomy    HX CARPAL TUNNEL RELEASE Left     HX CORONARY [address], 09/09/2016    HX HYSTERECTOMY      HX OTHER SURGICAL      bladder surgeries x3    HX PACEMAKER PLACEMENT  12/07/2018    MT ABDOMEN SURGERY PROC UNLISTED  2006    colon resection with anastomosis        Current Outpatient Medications   Medication Sig Dispense Refill    fosfomycin (MONUROL) 3 gram pack oral packet TAKE 1 PACKET BY MOUTH ONCE FOR 1 DOSE.      amLODIPine (NORVASC) 5 mg tablet TAKE 1 TABLET BY MOUTH ONCE A DAY      sotaloL (BETAPACE) 80 mg tablet TAKE 1/2 TABLET (40MG) BY MOUTH ONCE A DAY.  losartan (COZAAR) 25 mg tablet TAKE 1 TABLET BY MOUTH DAILY. 90 Tab 1    esomeprazole (NexIUM) 40 mg capsule Take 40 mg by mouth daily.  Cetirizine (ZyrTEC) 10 mg cap Take 10 mg by mouth.  loperamide (IMODIUM) 2 mg capsule TAKE 1 CAPSULE BY MOUTH DAILY. 90 Cap 1    OTHER [employer]      Premarin 0.625 mg/gram vaginal cream APPLY A SMALL AMOUNT TO AFFECTED AREA EVERY NIGHT AS DIRECTED 30 g 3    DERMOTIC OIL 0.01 % drop APPLY 1-2 DROPS PER EAR AS NEEDED      albuterol (PROVENTIL HFA, VENTOLIN HFA, PROAIR HFA) 90 mcg/actuation inhaler Take 2 Puffs by inhalation every six (6) hours as needed for Wheezing. 1 Inhaler 3    B infantis/B ani/B lorene/B bifid (PROBIOTIC 4X PO) Take  by mouth.  cranberry extract 650 mg cap Take 1 Cap by mouth daily.  ketoconazole (NIZORAL) 2 % shampoo Use as directed daily prn 1 Bottle 5    amLODIPine (NORVASC) 10 mg tablet Take 10 mg by mouth daily.  apixaban (ELIQUIS) 2.5 mg tablet Take 2.5 mg by mouth two (2) times a day.  sotalol (BETAPACE) 80 mg tablet Take 40 mg by mouth daily.  rosuvastatin (CRESTOR) 10 mg tablet Take 10 mg by mouth nightly.  nitroglycerin (NITROSTAT) 0.4 mg SL tablet One tab sublingual every 5 min for relief of pain x 3 as necessary. Report to ER if pain lasts longer than 30 min      nitrofurantoin, macrocrystal-monohydrate, (MACROBID) 100 mg capsule Take 1 Capsule by mouth two (2) times a day. (Patient taking differently: Take 100 mg by mouth daily.) 90 Capsule 1    nitrofurantoin, macrocrystal-monohydrate, (MACROBID) 100 mg capsule Take 1 Cap by mouth two (2) times a day. 14 Cap 1    nitrofurantoin, macrocrystal-monohydrate, (MACROBID) 100 mg capsule Take 1 Cap by mouth daily.  (Patient not taking: Reported on 7/19/2021) 90 Cap 0        Allergies Allergen Reactions    Ciprofloxacin Nausea and Vomiting    Doxycycline Nausea and Vomiting    Novocain [Procaine] Swelling    Sulfa (Sulfonamide Antibiotics) Rash        Social History     Socioeconomic History    Marital status:      Spouse name: Not on file    Number of children: Not on file    Years of education: Not on file    Highest education level: Not on file   Occupational History    Occupation: retired   Tobacco Use    Smoking status: Former Smoker     Types: Cigarettes     Quit date: 1983     Years since quittin.0    Smokeless tobacco: Never Used   Substance and Sexual Activity    Alcohol use: Yes     Comment: rare    Drug use: No    Sexual activity: Not Currently   Other Topics Concern    Not on file   Social History Narrative    Not on file     Social Determinants of Health     Financial Resource Strain:     Difficulty of Paying Living Expenses:    Food Insecurity:     Worried About [address] [employer] in the Last Year:     [address] [employer] in the Last Year:    Transportation Needs:     Lack of Transportation (Medical):      Lack of Transportation (Non-Medical):    Physical Activity:     Days of Exercise per Week:     Minutes of Exercise per Session:    Stress:     Feeling of Stress :    Social Connections:     Frequency of Communication with Friends and Family:     Frequency of Social Gatherings with Friends and Family:     Attends Synagogue Services:     Active Member of Clubs or Organizations:     Attends Club or Organization Meetings:     Marital Status:    Intimate Partner Violence:     Fear of Current or Ex-Partner:     Emotionally Abused:     Physically Abused:     Sexually Abused:         Family History   Problem Relation Age of Onset    Stroke Mother     Heart Disease Father         chf    Cancer Sister            Visit Vitals  BP (!) 128/52 (BP 1 Location: Left arm, BP Patient Position: Sitting)   Pulse 63   Temp 97.3 °F (36.3 °C) (Temporal)   Resp 16   Ht 4' 8\" (1.422 m)   Wt 122 lb 6.4 oz (55.5 kg)   SpO2 98%   BMI 27.44 kg/m²        Review of Systems   Respiratory: Negative for shortness of breath. Cardiovascular: Negative for chest pain. Gastrointestinal: Negative for blood in stool. Genitourinary: Negative for hematuria. Neurological: Negative for tingling. Endo/Heme/Allergies: Negative for polydipsia. Physical Exam  Constitutional:       General: She is not in acute distress. Appearance: She is not ill-appearing. Eyes:      General: No scleral icterus. Conjunctiva/sclera: Conjunctivae normal.   Cardiovascular:      Rate and Rhythm: Normal rate. Heart sounds: No friction rub. No gallop. Pulmonary:      Effort: Pulmonary effort is normal.      Breath sounds: Normal breath sounds. Abdominal:      General: Abdomen is flat. Palpations: Abdomen is soft. Tenderness: There is no abdominal tenderness. Musculoskeletal:      Cervical back: Neck supple. Comments: No clubbing, cyanosis, or edema. Calves are nontender. There are changes of venous insufficiency in bilateral distal lower extremities. Skin:     General: Skin is warm and dry. Neurological:      Mental Status: She is alert and oriented to person, place, and time. Psychiatric:         Mood and Affect: Mood normal.                  Diagnoses and all orders for this visit:    1. New onset type 2 diabetes mellitus ([facility] [address]  Comments:  A1C up from 7.1% to 7.2%-- reocmmend switch Boost to Glucerna, A1C fingerstick next visit  Orders:  -     AMB POC HEMOGLOBIN A1C    2. Bilateral hand numbness  Comments:  per daughter, due to longstanding carpal tunnel syndrome. Agree with wearing splints at night. Intolerant even low-dose gabapentin for symptoms. 3. Bilateral leg pain  Comments:  Suggestive of intermittent claudication. Had significant problems with last anesthesia for procedure.          Follow-up and Dispositions    · Return for 15 minutes.               [place], MD

## 2021-08-20 ENCOUNTER — TELEPHONE (OUTPATIENT)
Dept: INTERNAL MEDICINE CLINIC | Age: 86
End: 2021-08-20

## 2021-08-20 RX ORDER — LOPERAMIDE HYDROCHLORIDE 2 MG/1
CAPSULE ORAL
Qty: 90 CAPSULE | Refills: 1 | Status: SHIPPED | OUTPATIENT
Start: 2021-08-20 | End: 2022-03-30

## 2021-08-20 NOTE — TELEPHONE ENCOUNTER
Pt's daughter Jae Portillo calling. Wants to know if mother should get booster Covid shot now since she resides at the [facility] and has chronic UTI (4 infections in last 3 months). She called Priyanka yesterday and was told she would need a note from her doctor to get booster shot now. Daughter said pt got 2nd Covid vaccine in Jan.  They are planning on traveling to [place] for a wedding late next week and over 200 people will be there so she is concerned. Please advise Jaelyn at [phone].

## 2021-08-27 NOTE — TELEPHONE ENCOUNTER
Spoke with Gianna Salinas she is aware she can print letter from my chart. Gianna Salinas states she will.

## 2021-08-31 ENCOUNTER — TELEPHONE (OUTPATIENT)
Dept: INTERNAL MEDICINE CLINIC | Age: 86
End: 2021-08-31

## 2021-08-31 DIAGNOSIS — R26.89 BALANCE DISORDER: Primary | ICD-10-CM

## 2021-08-31 NOTE — TELEPHONE ENCOUNTER
Patients daughter called again and is trying to speak with Anaya Galarza regarding patients uti issues. Jaelyn(hipaa ok) would like a call today. Thank you    Message from yesterday:    Pt daughter Gianna Salinas called in stating that her mother just finished up her abx yesterday and is still having the same symptoms a long with frequency. She stated that her mom was once on Macrobid that work well for her but she would like to know what Radha Anthony would suggest. Her call back number is [phone].     Please Assist. Thank You.

## 2021-08-31 NOTE — TELEPHONE ENCOUNTER
Pt's daughter Janett Wayne called. Said mother was getting physical therapy for balance issues but stopped it about 5 months ago. She is asking if an order can be faxed to [facility] at [facility] where pt resides to get this re-started (their phone # is [phone])    She also wants to thank Dr Roseann Espino for the letter so pt could get the [employer] covid booster shot. She feels much better knowing pt has received this.

## 2021-10-07 ENCOUNTER — TELEPHONE (OUTPATIENT)
Dept: INTERNAL MEDICINE CLINIC | Age: 86
End: 2021-10-07

## 2021-10-07 DIAGNOSIS — R53.1 WEAKNESS GENERALIZED: Primary | ICD-10-CM

## 2021-10-07 NOTE — TELEPHONE ENCOUNTER
Dominique Smith from [address] [place] [address] called asking for orders for physical therapy   Fax # [phone]

## 2021-10-12 NOTE — TELEPHONE ENCOUNTER
Daughter calling says she got call from [employer] about the physical therapy and not  [employer]. Can you please be sure it goes to correct company and cancel [employer]?

## 2021-10-21 ENCOUNTER — OFFICE VISIT (OUTPATIENT)
Dept: INTERNAL MEDICINE CLINIC | Age: 86
End: 2021-10-21
Payer: MEDICARE

## 2021-10-21 VITALS
SYSTOLIC BLOOD PRESSURE: 141 MMHG | WEIGHT: 123.2 LBS | RESPIRATION RATE: 16 BRPM | HEIGHT: 56 IN | HEART RATE: 64 BPM | BODY MASS INDEX: 27.71 KG/M2 | OXYGEN SATURATION: 95 % | DIASTOLIC BLOOD PRESSURE: 55 MMHG | TEMPERATURE: 97.5 F

## 2021-10-21 DIAGNOSIS — F50.89 PICA: ICD-10-CM

## 2021-10-21 DIAGNOSIS — I73.9 PERIPHERAL VASCULAR DISEASE (HCC): ICD-10-CM

## 2021-10-21 DIAGNOSIS — G25.81 RESTLESS LEGS: ICD-10-CM

## 2021-10-21 DIAGNOSIS — E11.9 NEW ONSET TYPE 2 DIABETES MELLITUS (HCC): Primary | ICD-10-CM

## 2021-10-21 DIAGNOSIS — Z23 NEEDS FLU SHOT: ICD-10-CM

## 2021-10-21 DIAGNOSIS — M79.605 BILATERAL LEG PAIN: ICD-10-CM

## 2021-10-21 DIAGNOSIS — N39.0 RECURRENT URINARY TRACT INFECTION: ICD-10-CM

## 2021-10-21 DIAGNOSIS — M79.604 BILATERAL LEG PAIN: ICD-10-CM

## 2021-10-21 LAB — HBA1C MFR BLD HPLC: 5.3 %

## 2021-10-21 PROCEDURE — G8419 CALC BMI OUT NRM PARAM NOF/U: HCPCS | Performed by: INTERNAL MEDICINE

## 2021-10-21 PROCEDURE — 99214 OFFICE O/P EST MOD 30 MIN: CPT | Performed by: INTERNAL MEDICINE

## 2021-10-21 PROCEDURE — 1101F PT FALLS ASSESS-DOCD LE1/YR: CPT | Performed by: INTERNAL MEDICINE

## 2021-10-21 PROCEDURE — G8536 NO DOC ELDER MAL SCRN: HCPCS | Performed by: INTERNAL MEDICINE

## 2021-10-21 PROCEDURE — G8427 DOCREV CUR MEDS BY ELIG CLIN: HCPCS | Performed by: INTERNAL MEDICINE

## 2021-10-21 PROCEDURE — 1090F PRES/ABSN URINE INCON ASSESS: CPT | Performed by: INTERNAL MEDICINE

## 2021-10-21 PROCEDURE — 83036 HEMOGLOBIN GLYCOSYLATED A1C: CPT | Performed by: INTERNAL MEDICINE

## 2021-10-21 PROCEDURE — 90662 IIV NO PRSV INCREASED AG IM: CPT | Performed by: INTERNAL MEDICINE

## 2021-10-21 PROCEDURE — G8432 DEP SCR NOT DOC, RNG: HCPCS | Performed by: INTERNAL MEDICINE

## 2021-10-21 NOTE — PROGRESS NOTES
After obtaining consent, and per orders of Dr. Trenda Aschoff, injection of Influenza Vaccine given by Neomi Habermann. Patient instructed to remain in clinic for 20 minutes afterwards, and to report any adverse reaction to me immediately. Depression Screening:  3 most recent PHQ Screens 7/19/2021   Little interest or pleasure in doing things Not at all   Feeling down, depressed, irritable, or hopeless Not at all   Total Score PHQ 2 0       Learning Assessment:  Learning Assessment 9/18/2019   PRIMARY LEARNER Patient   HIGHEST LEVEL OF EDUCATION - PRIMARY LEARNER  -   BARRIERS PRIMARY LEARNER -   CO-LEARNER CAREGIVER -   PRIMARY LANGUAGE ENGLISH   LEARNER PREFERENCE PRIMARY LISTENING   ANSWERED BY patient   RELATIONSHIP SELF       Abuse Screening:  Abuse Screening Questionnaire 7/19/2021   Do you ever feel afraid of your partner? N   Are you in a relationship with someone who physically or mentally threatens you? N   Is it safe for you to go home? Y       Fall Risk  Fall Risk Assessment, last 12 mths 10/21/2021   Able to walk? Yes   Fall in past 12 months? 0   Do you feel unsteady? -   Are you worried about falling -   Is TUG test greater than 12 seconds?  -   Is the gait abnormal? -   Number of falls in past 12 months -   Fall with injury? -       ADL  ADL Assessment 3/29/2021   Feeding yourself No Help Needed   Getting from bed to chair No Help Needed   Getting dressed No Help Needed   Bathing or showering No Help Needed   Walk across the room (includes cane/walker) Help Needed   Using the telphone No Help Needed   Taking your medications No Help Needed   Preparing meals Help Needed   Managing money (expenses/bills) Help Needed   Moderately strenuous housework (laundry) Help Needed   Shopping for personal items (toiletries/medicines) Help Needed   Shopping for groceries Help Needed   Driving Help Needed   Climbing a flight of stairs Help Needed   Getting to places beyond walking distances Help Needed Travel Screening:    Travel Screening     Question   Response    In the last month, have you been in contact with someone who was confirmed or suspected to have Coronavirus / COVID-19? No / Unsure    Have you had a COVID-19 viral test in the last 14 days? No    Do you have any of the following new or worsening symptoms? None of these    Have you traveled internationally or domestically in the last month? No      Travel History   Travel since 09/21/21     No documented travel since 09/21/21          Health Maintenance reviewed and discussed and ordered per Provider. Health Maintenance Due   Topic Date Due    Foot Exam Q1  Never done    MICROALBUMIN Q1  Never done    Eye Exam Retinal or Dilated  Never done    COVID-19 Vaccine (3 - Pfizer booster) 08/03/2021    Flu Vaccine (1) 09/01/2021   . Robert Varghese presents today for   Chief Complaint   Patient presents with    Follow-up       Is someone accompanying this pt? Yes, Griffin Mane daughter    Is the patient using any DME equipment during OV? Yes, walker    Coordination of Care:  1. Have you been to the ER, urgent care clinic since your last visit? Hospitalized since your last visit? NO    2. Have you seen or consulted any other health care providers outside of the [address] [place] [address] since your last visit? Include any pap smears or colon screening.  NO

## 2021-10-22 NOTE — PROGRESS NOTES
FANTASMA     Curtis Corona is here with her daughter to follow-up on her recently diagnosed type 2 diabetes. Since she was last here, there has been a new  at her assisted living community. Mrs. Keny Villatoro has also made an effort to consume fewer desserts. She recently has started having trouble with leg pain at rest.  She notices this when she first gets up in the morning. This is different from her intermittent claudication with activity. She has recently been several courses of antibiotics for recurrent UTIs. She usually has to be cathed for urine because she is incontinent of urine, though she reports today that she is sometimes able to give a urine specimen spontaneously. Past Medical History:   Diagnosis Date    A-fib [facility])     Arthritis     Breast cancer ([facility] [address] 2002    left breast    CAD (coronary artery disease)     Cancer ([facility] [address]     breast and colon    Colon cancer ([facility] [address] 2007    Coronary stent patent 2016    GERD (gastroesophageal reflux disease)     High cholesterol     Hypertension     Pacemaker 12/2018    Peripheral neuropathy     hands    Urinary incontinence         Past Surgical History:   Procedure Laterality Date    HX APPENDECTOMY      HX BREAST BIOPSY Left 2006    lumpectomy    HX CARPAL TUNNEL RELEASE Left     HX CORONARY [address] 09/09/2016    HX HYSTERECTOMY      HX OTHER SURGICAL      bladder surgeries x3    HX PACEMAKER PLACEMENT  12/07/2018    AL ABDOMEN SURGERY PROC UNLISTED  2006    colon resection with anastomosis        Current Outpatient Medications   Medication Sig Dispense Refill    OTHER D-mannose PO      Premarin 0.625 mg/gram vaginal cream APPLY A SMALL AMOUNT TO AFFECTED AREA EVERY NIGHT AS DIRECTED 30 g 3    cephALEXin (KEFLEX) 250 mg capsule Take 1 Capsule by mouth daily. 90 Capsule 3    cefdinir (OMNICEF) 300 mg capsule Take 1 Capsule by mouth two (2) times a day.  14 Capsule 0    loperamide (IMODIUM) 2 mg capsule TAKE 1 CAPSULE BY MOUTH DAILY. 90 Capsule 1    cefdinir (OMNICEF) 300 mg capsule Take 1 Capsule by mouth two (2) times a day. 14 Capsule 0    cefdinir (OMNICEF) 300 mg capsule Take 1 Capsule by mouth two (2) times a day. 14 Capsule 0    fosfomycin (MONUROL) 3 gram pack oral packet TAKE 1 PACKET BY MOUTH ONCE FOR 1 DOSE.  amLODIPine (NORVASC) 5 mg tablet TAKE 1 TABLET BY MOUTH ONCE A DAY      sotaloL (BETAPACE) 80 mg tablet TAKE 1/2 TABLET (40MG) BY MOUTH ONCE A DAY.  nitrofurantoin, macrocrystal-monohydrate, (MACROBID) 100 mg capsule Take 1 Capsule by mouth two (2) times a day. (Patient taking differently: Take 100 mg by mouth daily.) 90 Capsule 1    losartan (COZAAR) 25 mg tablet TAKE 1 TABLET BY MOUTH DAILY. 90 Tab 1    esomeprazole (NexIUM) 40 mg capsule Take 40 mg by mouth daily.  Cetirizine (ZyrTEC) 10 mg cap Take 10 mg by mouth.  nitrofurantoin, macrocrystal-monohydrate, (MACROBID) 100 mg capsule Take 1 Cap by mouth two (2) times a day. 14 Cap 1    nitrofurantoin, macrocrystal-monohydrate, (MACROBID) 100 mg capsule Take 1 Cap by mouth daily. 90 Cap 0    OTHER [employer]      DERMOTIC OIL 0.01 % drop APPLY 1-2 DROPS PER EAR AS NEEDED      albuterol (PROVENTIL HFA, VENTOLIN HFA, PROAIR HFA) 90 mcg/actuation inhaler Take 2 Puffs by inhalation every six (6) hours as needed for Wheezing. 1 Inhaler 3    B infantis/B ani/B lorene/B bifid (PROBIOTIC 4X PO) Take  by mouth.  cranberry extract 650 mg cap Take 1 Cap by mouth daily.  ketoconazole (NIZORAL) 2 % shampoo Use as directed daily prn 1 Bottle 5    amLODIPine (NORVASC) 10 mg tablet Take 10 mg by mouth daily.  apixaban (ELIQUIS) 2.5 mg tablet Take 2.5 mg by mouth two (2) times a day.  sotalol (BETAPACE) 80 mg tablet Take 40 mg by mouth daily.  rosuvastatin (CRESTOR) 10 mg tablet Take 10 mg by mouth nightly.       nitroglycerin (NITROSTAT) 0.4 mg SL tablet One tab sublingual every 5 min for relief of pain x 3 as necessary. Report to ER if pain lasts longer than 30 min          Allergies   Allergen Reactions    Ciprofloxacin Nausea and Vomiting    Doxycycline Nausea and Vomiting    Novocain [Procaine] Swelling    Sulfa (Sulfonamide Antibiotics) Rash        Social History     Socioeconomic History    Marital status:      Spouse name: Not on file    Number of children: Not on file    Years of education: Not on file    Highest education level: Not on file   Occupational History    Occupation: retired   Tobacco Use    Smoking status: Former Smoker     Types: Cigarettes     Quit date: 1983     Years since quittin.2    Smokeless tobacco: Never Used   Vaping Use    Vaping Use: Never used   Substance and Sexual Activity    Alcohol use: Yes     Comment: rare    Drug use: No    Sexual activity: Not Currently   Other Topics Concern    Not on file   Social History Narrative    Not on file     Social Determinants of Health     Financial Resource Strain:     Difficulty of Paying Living Expenses:    Food Insecurity:     Worried About [address] [employer] in the Last Year:     [address] [employer] in the Last Year:    Transportation Needs:     Lack of Transportation (Medical):      Lack of Transportation (Non-Medical):    Physical Activity:     Days of Exercise per Week:     Minutes of Exercise per Session:    Stress:     Feeling of Stress :    Social Connections:     Frequency of Communication with Friends and Family:     Frequency of Social Gatherings with Friends and Family:     Attends Yarsani Services:     Active Member of Clubs or Organizations:     Attends Club or Organization Meetings:     Marital Status:    Intimate Partner Violence:     Fear of Current or Ex-Partner:     Emotionally Abused:     Physically Abused:     Sexually Abused:         Family History   Problem Relation Age of Onset    Stroke Mother     Heart Disease Father         chf    Cancer Sister            Visit Vitals  BP (!) 141/55 (BP 1 Location: Right upper arm, BP Patient Position: Sitting)   Pulse 64   Temp 97.5 °F (36.4 °C) (Temporal)   Resp 16   Ht 4' 8\" (1.422 m)   Wt 123 lb 3.2 oz (55.9 kg)   SpO2 95%   BMI 27.62 kg/m²        Review of Systems   Eyes: Negative for blurred vision. Respiratory: Negative for shortness of breath. Cardiovascular: Negative for chest pain. Gastrointestinal: Negative for blood in stool. Genitourinary: Negative for hematuria. Musculoskeletal: Positive for back pain. Neurological: Negative for headaches. Endo/Heme/Allergies:        Positive for pica, not new. Physical Exam  Constitutional:       General: She is not in acute distress. Eyes:      General: No scleral icterus. Conjunctiva/sclera: Conjunctivae normal.   Neck:      Comments: Bilateral carotid upstrokes normal to palpation. Cardiovascular:      Rate and Rhythm: Normal rate and regular rhythm. Heart sounds: No friction rub. No gallop. Comments: Bilateral pedal pulses are nonpalpable, though feet are warm and pink. Pulmonary:      Effort: Pulmonary effort is normal.      Breath sounds: Normal breath sounds. Abdominal:      General: Abdomen is flat. Palpations: Abdomen is soft. Tenderness: There is no abdominal tenderness. Musculoskeletal:      Cervical back: Neck supple. Comments: No clubbing, cyanosis, or edema. Skin:     General: Skin is warm and dry. Neurological:      Mental Status: She is alert and oriented to person, place, and time. Psychiatric:         Mood and Affect: Mood normal.                  Diagnoses and all orders for this visit:    1. New onset type 2 diabetes mellitus ([facility] [address]  Comments:  A1c now at goal under 7%, possibly due to change in diet and decreased simple carb ingestion. Orders:  -     AMB POC HEMOGLOBIN A1C  -     HEMOGLOBIN A1C WITH EAG; Future  -     MICROALBUMIN, UR, 24 HR; Future    2. Bilateral leg pain  Comments:   At rest, consider restless legs. Rule out iron deficiency. 3. Pica  Comments:  Check iron studies  Orders:  -     IRON PROFILE; Future    4. Restless legs  Comments:  Leg pain at rest, check iron studies with next labs. Orders:  -     IRON PROFILE; Future    5. Recurrent urinary tract infection  Comments:  Per urology    6. Peripheral vascular disease ([facility] [address]  Comments:  Further evaluation and treatment not being pursued due to serious problems with anesthesia in the past    7. Needs flu shot  Comments:  High-dose flu vaccine given during today's visit. Orders:  -     INFLUENZA VIRUS VACCINE, HIGH DOSE SEASONAL, PRESERVATIVE FREE         Follow-up and Dispositions    · Return in about 4 months (around 2/21/2022) for 30 minutes [facility]/ [facility]-- schedule labs a week or two before.               Addy Edwards MD

## 2021-11-03 ENCOUNTER — TRANSCRIBE ORDER (OUTPATIENT)
Dept: SCHEDULING | Age: 86
End: 2021-11-03

## 2021-11-03 DIAGNOSIS — Z12.31 VISIT FOR SCREENING MAMMOGRAM: Primary | ICD-10-CM

## 2021-11-12 ENCOUNTER — HOSPITAL ENCOUNTER (OUTPATIENT)
Dept: MAMMOGRAPHY | Age: 86
Discharge: HOME | End: 2021-11-12
Attending: INTERNAL MEDICINE
Payer: MEDICARE

## 2021-11-12 DIAGNOSIS — Z12.31 VISIT FOR SCREENING MAMMOGRAM: ICD-10-CM

## 2021-11-12 PROCEDURE — 77063 BREAST TOMOSYNTHESIS BI: CPT

## 2022-02-15 ENCOUNTER — TELEPHONE (OUTPATIENT)
Dept: INTERNAL MEDICINE CLINIC | Age: 87
End: 2022-02-15

## 2022-02-15 NOTE — TELEPHONE ENCOUNTER
Make sure they have a pulse oximeter and no checking her pulse ox 3 times a day, plus if she feels more short of breath. If her pulse oximeter is under 92% and persists, she needs to go to the emergency room to be evaluated. The monoclonal antibodies IV are not effective against the current Covid variant, there are pills that I consenter, but I need to see her virtually to assess how sick she is. Please schedule her for a virtual visit tomorrow at 1130. I will come into the office tomorrow to do that, please schedule it.

## 2022-02-15 NOTE — TELEPHONE ENCOUNTER
Pt tested positive for Covid this morning, rapid test done at her assisted living center per daughter Michael Pierson. Said pt told her yesterday she wasn't feeling good, she has been coughing and not eating much. Also pt is fatigued. Freda Campos is calling to see if pt can get monochordal antibodies.      Please advise her at [phone]

## 2022-02-16 ENCOUNTER — TELEPHONE (OUTPATIENT)
Dept: INTERNAL MEDICINE CLINIC | Age: 87
End: 2022-02-16

## 2022-02-16 ENCOUNTER — VIRTUAL VISIT (OUTPATIENT)
Dept: INTERNAL MEDICINE CLINIC | Age: 87
End: 2022-02-16
Payer: MEDICARE

## 2022-02-16 DIAGNOSIS — U07.1 COVID-19 VIRUS INFECTION: Primary | ICD-10-CM

## 2022-02-16 DIAGNOSIS — U07.1 COVID-19 VIRUS INFECTION: ICD-10-CM

## 2022-02-16 PROBLEM — E11.9 TYPE 2 DIABETES MELLITUS (HCC): Status: ACTIVE | Noted: 2022-02-16

## 2022-02-16 PROCEDURE — G8427 DOCREV CUR MEDS BY ELIG CLIN: HCPCS | Performed by: INTERNAL MEDICINE

## 2022-02-16 PROCEDURE — G0463 HOSPITAL OUTPT CLINIC VISIT: HCPCS | Performed by: INTERNAL MEDICINE

## 2022-02-16 PROCEDURE — G8510 SCR DEP NEG, NO PLAN REQD: HCPCS | Performed by: INTERNAL MEDICINE

## 2022-02-16 PROCEDURE — 1090F PRES/ABSN URINE INCON ASSESS: CPT | Performed by: INTERNAL MEDICINE

## 2022-02-16 PROCEDURE — 1101F PT FALLS ASSESS-DOCD LE1/YR: CPT | Performed by: INTERNAL MEDICINE

## 2022-02-16 PROCEDURE — 99214 OFFICE O/P EST MOD 30 MIN: CPT | Performed by: INTERNAL MEDICINE

## 2022-02-16 RX ORDER — PREGABALIN 100 MG/1
CAPSULE ORAL 2 TIMES DAILY
COMMUNITY
End: 2022-03-23 | Stop reason: SINTOL

## 2022-02-16 RX ORDER — DEXTROMETHORPHAN HYDROBROMIDE, GUAIFENESIN 5; 100 MG/5ML; MG/5ML
1000 LIQUID ORAL 2 TIMES DAILY
COMMUNITY
End: 2022-08-17 | Stop reason: SDUPTHER

## 2022-02-16 NOTE — TELEPHONE ENCOUNTER
Priyanka called stating they are filling the medication at no charge for the patient. She also wanted to make you aware that in the future it should be written for 3 tablets twice a day for 5 days.

## 2022-02-16 NOTE — PROGRESS NOTES
Shannan Shea is a 80 y.o. female who was seen by synchronous (real-time) audio-video technology on 2/16/2022 for Cold Symptoms (covid)        Assessment & Plan:   Diagnoses and all orders for this visit:    1. COVID-19 virus infection  Comments:  Rx sent for Paxlovid after discussion. Pulse ox 3 times daily and as needed, seek care ASAP if persists under 92 or dyspnea occurs. Notify if not improving    Orders:  -     nirmatrelvir-ritonavir (PAXLOVID) 300 mg (150 mg x 2)-100 mg tablet; Take 1 dose (2 tablets of emtrelivirplus 1 tablet of ritonavir) p.o. twice daily for 5 days. 712  Subjective:     Shannan Shea is a 80-year-old woman seen virtually after being diagnosed with Covid today. She initially developed symptoms 48 hours ago, her daughter treated her with Mucinex and Vicks, but Mrs. Sharon Kirk called her daughter this morning about canceling her ophthalmology appointment due to not feeling well. Family called yesterday wondering about going to the ER for IV monoclonal antibody. We discussed that the most common current variant of Covid, omicron, has not been particularly responsive to the IV monoclonal antibodies, but that there are effective oral medications that can decrease the risk of hospitalization and death by 30 to 85%. Ms. Sharon Kirk is on Eliquis 2.5 mg twice daily, and I spoke to her daughter after the virtual visit advising her that I am aware of the interaction, but at this low dose of Eliquis, no dose adjustment is necessary. They would like me to send this in. They got a pulse oximeter, and her oxygen saturation was low yesterday at 92%, but has been ranging in the 96% area since. Prior to Admission medications    Medication Sig Start Date End Date Taking? Authorizing Provider   acetaminophen (Tylenol Arthritis Pain) 650 mg TbER Take 650 mg by mouth every eight (8) hours.    Yes Provider, Historical   nirmatrelvir-ritonavir (PAXLOVID) 300 mg (150 mg x 2)-100 mg tablet Take 1 dose (2 tablets of emtrelivirplus 1 tablet of ritonavir) p.o. twice daily for 5 days. 2/16/22  Yes Naren Keen MD   losartan (COZAAR) 25 mg tablet TAKE 1 TABLET BY MOUTH DAILY. 10/22/21  Yes New Bustamante MD   OTHER D-mannose PO   Yes Provider, Historical   Premarin 0.625 mg/gram vaginal cream APPLY A SMALL AMOUNT TO AFFECTED AREA EVERY NIGHT AS DIRECTED 10/11/21  Yes Philly Pinto PA-C   cephALEXin (KEFLEX) 250 mg capsule Take 1 Capsule by mouth daily. 10/5/21  Yes Shweta Potts MD   loperamide (IMODIUM) 2 mg capsule TAKE 1 CAPSULE BY MOUTH DAILY. 8/20/21  Yes New Bustamante MD   amLODIPine (NORVASC) 5 mg tablet TAKE 1 TABLET BY MOUTH ONCE A DAY 4/5/21  Yes Provider, Historical   OTHER [facility]   Yes Provider, Historical   DERMOTIC OIL 0.01 % drop APPLY 1-2 DROPS PER EAR AS NEEDED 2/13/20  Yes Provider, Historical   albuterol (PROVENTIL HFA, VENTOLIN HFA, PROAIR HFA) 90 mcg/actuation inhaler Take 2 Puffs by inhalation every six (6) hours as needed for Wheezing. 2/7/20  Yes New Bustamante MD   B infantis/B ani/B lorene/B bifid (PROBIOTIC 4X PO) Take  by mouth. Yes Provider, Historical   cranberry extract 650 mg cap Take 1 Cap by mouth daily. Yes Provider, Historical   ketoconazole (NIZORAL) 2 % shampoo Use as directed daily prn 6/14/19  Yes Casandra Bobby MD   amLODIPine (NORVASC) 10 mg tablet Take 10 mg by mouth daily. 12/13/18  Yes Provider, Historical   apixaban (ELIQUIS) 2.5 mg tablet Take 2.5 mg by mouth two (2) times a day. 12/12/18  Yes Provider, Historical   sotalol (BETAPACE) 80 mg tablet Take 40 mg by mouth daily. 12/12/18  Yes Provider, Historical   rosuvastatin (CRESTOR) 10 mg tablet Take 10 mg by mouth nightly. 11/7/18  Yes Provider, Historical   nitroglycerin (NITROSTAT) 0.4 mg SL tablet One tab sublingual every 5 min for relief of pain x 3 as necessary.   Report to ER if pain lasts longer than 30 min 10/5/17  Yes Provider, Historical   Athlete's Foot, terbinafine, 1 % topical cream APPLY 1 (ONE) THIN LAYER ONCE DAILY TO AFFECTED AREAS ON FEET X 3-4 WEEKS  Patient not taking: Reported on 2/16/2022 9/22/21 2/16/22  Provider, Historical   cefdinir (OMNICEF) 300 mg capsule Take 1 Capsule by mouth two (2) times a day. Patient not taking: Reported on 2/16/2022 9/24/21 2/16/22  Magdy Cleveland MD   cefdinir (OMNICEF) 300 mg capsule Take 1 Capsule by mouth two (2) times a day. Patient not taking: Reported on 2/16/2022 8/19/21 2/16/22  Sneha Samaniego PA-C   cefdinir (OMNICEF) 300 mg capsule Take 1 Capsule by mouth two (2) times a day. Patient not taking: Reported on 2/16/2022 8/4/21 2/16/22  ANUP Cid   fosfomycin (MONUROL) 3 gram pack oral packet TAKE 1 PACKET BY MOUTH ONCE FOR 1 DOSE. Patient not taking: Reported on 2/16/2022 6/17/21 2/16/22  Provider, Historical   sotaloL (BETAPACE) 80 mg tablet TAKE 1/2 TABLET (40MG) BY MOUTH ONCE A DAY. 1/5/21 2/16/22  Provider, Historical   nitrofurantoin, macrocrystal-monohydrate, (MACROBID) 100 mg capsule Take 1 Capsule by mouth two (2) times a day. Patient not taking: Reported on 2/16/2022 6/17/21 2/16/22  Magdy Cleveland MD   esomeprazole (NexIUM) 40 mg capsule Take 40 mg by mouth daily. Patient not taking: Reported on 2/16/2022 2/16/22  Provider, Historical   Cetirizine (ZyrTEC) 10 mg cap Take 10 mg by mouth. Patient not taking: Reported on 2/16/2022 2/16/22  Provider, Historical   nitrofurantoin, macrocrystal-monohydrate, (MACROBID) 100 mg capsule Take 1 Cap by mouth two (2) times a day. Patient not taking: Reported on 2/16/2022 12/29/20 2/16/22  Magdy Cleveland MD   nitrofurantoin, macrocrystal-monohydrate, (MACROBID) 100 mg capsule Take 1 Cap by mouth daily. Patient not taking: Reported on 2/16/2022 12/11/20 2/16/22  Magdy Cleveland MD         Review of Systems   Constitutional: Positive for chills. Negative for fever. HENT: Positive for congestion and sore throat.          No loss of smell, no loss of taste. Respiratory: Positive for cough. Negative for sputum production and shortness of breath. Gastrointestinal: Negative for diarrhea, nausea and vomiting. Musculoskeletal: Positive for myalgias. Skin: Negative for rash. Neurological: Negative for headaches. Objective:     Patient-Reported Vitals 2/16/2022   Patient-Reported Pulse 90   Patient-Reported SpO2 97%   Patient-Reported Systolic  741   Patient-Reported Diastolic 52      General: alert, cooperative, no distress   Mental  status: normal mood, behavior, speech, dress, motor activity, and thought processes, able to follow commands   HENT: NCAT   Neck: no visualized mass   Resp: no respiratory distress   Neuro: no gross deficits   Skin: no discoloration or lesions of concern on visible areas   Psychiatric: normal affect, consistent with stated mood, no evidence of hallucinations     Additional exam findings:     Alert, nontoxic, blowing her nose. We discussed the expected course, resolution and complications of the diagnosis(es) in detail. Medication risks, benefits, costs, interactions, and alternatives were discussed as indicated. I advised her to contact the office if her condition worsens, changes or fails to improve as anticipated. She expressed understanding with the diagnosis(es) and plan. Candance Netpipo, was evaluated through a synchronous (real-time) audio-video encounter. The patient (or guardian if applicable) is aware that this is a billable service. Verbal consent to proceed has been obtained within the past 12 months. The visit was conducted pursuant to the emergency declaration under the [address] [place] [address] authority and the [employer] General Act. Patient identification was verified, and a caregiver was present when appropriate.  The patient was located in a state where the provider was credentialed to provide care.    Senthil Koenig MD

## 2022-02-16 NOTE — TELEPHONE ENCOUNTER
Pt has covid, had virtual appt today with Dr Carmella Smart. Pt's daughter stated [facility] does not have the medication Paxlovid in stock. She found out the [facility] has it. The RX can not be transfer she was told    Asking for Dr Carmella Smart or provider on call to send to [facility].     Medication needed as soon as possible for \"window of treatment opportunity\"    Sending also to provider on call Dr Jesu Aviles

## 2022-03-07 ENCOUNTER — TELEPHONE (OUTPATIENT)
Dept: INTERNAL MEDICINE CLINIC | Age: 87
End: 2022-03-07

## 2022-03-07 NOTE — TELEPHONE ENCOUNTER
Daughter Cristel Knightu is aware patient will need a virtual appointment with PCP to decide what type of PT is needed. Cristel Stockton stated patient had COVID and was on Anti viral, and wanted to know if the weakness could be caused by this. Cristel Stockton was advised the weakness could be residual from COVID and could possible be from the anti viral.  Cristel Stockton states patient has an appointment on 3/10/2022 for lab and an appointment 1 week later which she will bring patient to. Cristel Stockton was advised to monitor patient and call back on Monday if an appointment is needed. Faiza verbalizes understanding.

## 2022-03-07 NOTE — TELEPHONE ENCOUNTER
----- Message from Radha Rivera sent at 3/7/2022  8:02 AM EST -----  Subject: Referral Request    QUESTIONS   Reason for referral request? Physical therapy   Has the physician seen you for this condition before? No   Preferred Specialist (if applicable)? Do you already have an appointment scheduled? No  Additional Information for Provider? Patient's daughter wanting patient to   undergo physical therapy due to leg weakness, potentially connected to   previously having COVID on 2/14. Wants it to be at the [facility] at   the home the patient says at. Phone Number? [phone] Fax? [phone]  ---------------------------------------------------------------------------  --------------  Herminio DOTSON  What is the best way for the office to contact you?  OK to leave message on   voicemail  Preferred Call Back Phone Number? [phone]

## 2022-03-10 ENCOUNTER — APPOINTMENT (OUTPATIENT)
Dept: INTERNAL MEDICINE CLINIC | Age: 87
End: 2022-03-10

## 2022-03-10 ENCOUNTER — HOSPITAL ENCOUNTER (OUTPATIENT)
Dept: LAB | Age: 87
Discharge: HOME | End: 2022-03-10
Payer: MEDICARE

## 2022-03-10 DIAGNOSIS — G25.81 RESTLESS LEGS: ICD-10-CM

## 2022-03-10 DIAGNOSIS — U07.1 COVID-19 VIRUS INFECTION: Primary | ICD-10-CM

## 2022-03-10 DIAGNOSIS — F50.89 PICA: ICD-10-CM

## 2022-03-10 DIAGNOSIS — E11.9 NEW ONSET TYPE 2 DIABETES MELLITUS (HCC): ICD-10-CM

## 2022-03-10 LAB
BASOPHILS # BLD: 0 K/UL (ref 0–0.1)
BASOPHILS NFR BLD: 1 % (ref 0–2)
DIFFERENTIAL METHOD BLD: ABNORMAL
EOSINOPHIL # BLD: 0.3 K/UL (ref 0–0.4)
EOSINOPHIL NFR BLD: 5 % (ref 0–5)
ERYTHROCYTE [DISTWIDTH] IN BLOOD BY AUTOMATED COUNT: 14.5 % (ref 11.6–14.5)
EST. AVERAGE GLUCOSE BLD GHB EST-MCNC: 171 MG/DL
HBA1C MFR BLD: 7.6 % (ref 4.2–5.6)
HCT VFR BLD AUTO: 48 % (ref 35–45)
HGB BLD-MCNC: 15.2 G/DL (ref 12–16)
IMM GRANULOCYTES # BLD AUTO: 0 K/UL (ref 0–0.04)
IMM GRANULOCYTES NFR BLD AUTO: 0 % (ref 0–0.5)
IRON SATN MFR SERPL: 38 % (ref 20–50)
IRON SERPL-MCNC: 115 UG/DL (ref 50–175)
LYMPHOCYTES # BLD: 1.4 K/UL (ref 0.9–3.6)
LYMPHOCYTES NFR BLD: 20 % (ref 21–52)
MCH RBC QN AUTO: 30 PG (ref 24–34)
MCHC RBC AUTO-ENTMCNC: 31.7 G/DL (ref 31–37)
MCV RBC AUTO: 94.7 FL (ref 78–100)
MONOCYTES # BLD: 0.6 K/UL (ref 0.05–1.2)
MONOCYTES NFR BLD: 8 % (ref 3–10)
NEUTS SEG # BLD: 4.7 K/UL (ref 1.8–8)
NEUTS SEG NFR BLD: 67 % (ref 40–73)
NRBC # BLD: 0 K/UL (ref 0–0.01)
NRBC BLD-RTO: 0 PER 100 WBC
PLATELET # BLD AUTO: 129 K/UL (ref 135–420)
PMV BLD AUTO: 12.6 FL (ref 9.2–11.8)
RBC # BLD AUTO: 5.07 M/UL (ref 4.2–5.3)
TIBC SERPL-MCNC: 305 UG/DL (ref 250–450)
WBC # BLD AUTO: 7 K/UL (ref 4.6–13.2)

## 2022-03-10 PROCEDURE — 83036 HEMOGLOBIN GLYCOSYLATED A1C: CPT

## 2022-03-10 PROCEDURE — 36415 COLL VENOUS BLD VENIPUNCTURE: CPT

## 2022-03-10 PROCEDURE — 85025 COMPLETE CBC W/AUTO DIFF WBC: CPT

## 2022-03-10 PROCEDURE — 83540 ASSAY OF IRON: CPT

## 2022-03-18 PROBLEM — M47.812 SPONDYLOSIS WITHOUT MYELOPATHY OR RADICULOPATHY, CERVICAL REGION: Status: ACTIVE | Noted: 2017-01-16

## 2022-03-19 PROBLEM — R15.9 INCONTINENCE OF FECES: Status: ACTIVE | Noted: 2019-09-16

## 2022-03-19 PROBLEM — N32.81 OVERACTIVE BLADDER: Status: ACTIVE | Noted: 2019-09-16

## 2022-03-19 PROBLEM — M50.20 OTHER CERVICAL DISC DISPLACEMENT, UNSPECIFIED CERVICAL REGION: Status: ACTIVE | Noted: 2017-01-16

## 2022-03-19 PROBLEM — E11.9 TYPE 2 DIABETES MELLITUS (HCC): Status: ACTIVE | Noted: 2022-02-16

## 2022-03-19 PROBLEM — M51.24 OTHER INTERVERTEBRAL DISC DISPLACEMENT, THORACIC REGION: Status: ACTIVE | Noted: 2017-01-16

## 2022-03-19 PROBLEM — Z85.038 PERSONAL HISTORY OF COLON CANCER: Status: ACTIVE | Noted: 2019-09-16

## 2022-03-20 PROBLEM — M51.26 OTHER INTERVERTEBRAL DISC DISPLACEMENT, LUMBAR REGION: Status: ACTIVE | Noted: 2017-01-16

## 2022-03-20 PROBLEM — I48.91 ATRIAL FIBRILLATION (HCC): Status: ACTIVE | Noted: 2019-09-16

## 2022-03-21 ENCOUNTER — OFFICE VISIT (OUTPATIENT)
Dept: INTERNAL MEDICINE CLINIC | Age: 87
End: 2022-03-21
Payer: MEDICARE

## 2022-03-21 VITALS
TEMPERATURE: 97.3 F | WEIGHT: 120.2 LBS | DIASTOLIC BLOOD PRESSURE: 50 MMHG | BODY MASS INDEX: 24.23 KG/M2 | RESPIRATION RATE: 18 BRPM | HEIGHT: 59 IN | HEART RATE: 71 BPM | OXYGEN SATURATION: 96 % | SYSTOLIC BLOOD PRESSURE: 137 MMHG

## 2022-03-21 DIAGNOSIS — Z87.440 RECENT URINARY TRACT INFECTION: ICD-10-CM

## 2022-03-21 DIAGNOSIS — E11.9 TYPE 2 DIABETES MELLITUS WITHOUT COMPLICATION, WITHOUT LONG-TERM CURRENT USE OF INSULIN (HCC): Primary | ICD-10-CM

## 2022-03-21 PROCEDURE — G8420 CALC BMI NORM PARAMETERS: HCPCS | Performed by: INTERNAL MEDICINE

## 2022-03-21 PROCEDURE — 1090F PRES/ABSN URINE INCON ASSESS: CPT | Performed by: INTERNAL MEDICINE

## 2022-03-21 PROCEDURE — G8536 NO DOC ELDER MAL SCRN: HCPCS | Performed by: INTERNAL MEDICINE

## 2022-03-21 PROCEDURE — G8427 DOCREV CUR MEDS BY ELIG CLIN: HCPCS | Performed by: INTERNAL MEDICINE

## 2022-03-21 PROCEDURE — 3051F HG A1C>EQUAL 7.0%<8.0%: CPT | Performed by: INTERNAL MEDICINE

## 2022-03-21 PROCEDURE — G8510 SCR DEP NEG, NO PLAN REQD: HCPCS | Performed by: INTERNAL MEDICINE

## 2022-03-21 PROCEDURE — 1101F PT FALLS ASSESS-DOCD LE1/YR: CPT | Performed by: INTERNAL MEDICINE

## 2022-03-21 PROCEDURE — 99213 OFFICE O/P EST LOW 20 MIN: CPT | Performed by: INTERNAL MEDICINE

## 2022-03-21 PROCEDURE — G0463 HOSPITAL OUTPT CLINIC VISIT: HCPCS | Performed by: INTERNAL MEDICINE

## 2022-03-21 NOTE — PROGRESS NOTES
Dony Due presents today for   Chief Complaint   Patient presents with    Follow Up Chronic Condition       Is someone accompanying this pt? Yes, daughter    Is the patient using any DME equipment during OV? Yes, walker    Depression Screening:  3 most recent PHQ Screens 3/21/2022   Little interest or pleasure in doing things Not at all   Feeling down, depressed, irritable, or hopeless Not at all   Total Score PHQ 2 0       Learning Assessment:  Learning Assessment 9/18/2019   PRIMARY LEARNER Patient   HIGHEST LEVEL OF EDUCATION - PRIMARY LEARNER  -   BARRIERS PRIMARY LEARNER -   CO-LEARNER CAREGIVER -   PRIMARY LANGUAGE ENGLISH   LEARNER PREFERENCE PRIMARY LISTENING   ANSWERED BY patient   RELATIONSHIP SELF       Abuse Screening:  Abuse Screening Questionnaire 3/21/2022   Do you ever feel afraid of your partner? N   Are you in a relationship with someone who physically or mentally threatens you? N   Is it safe for you to go home? Y       Fall Risk  Fall Risk Assessment, last 12 mths 3/21/2022   Able to walk? Yes   Fall in past 12 months? 1   Do you feel unsteady? 0   Are you worried about falling 0   Is TUG test greater than 12 seconds? 0   Is the gait abnormal? 0   Number of falls in past 12 months 2   Fall with injury?  0       ADL  ADL Assessment 3/29/2021   Feeding yourself No Help Needed   Getting from bed to chair No Help Needed   Getting dressed No Help Needed   Bathing or showering No Help Needed   Walk across the room (includes cane/walker) Help Needed   Using the telphone No Help Needed   Taking your medications No Help Needed   Preparing meals Help Needed   Managing money (expenses/bills) Help Needed   Moderately strenuous housework (laundry) Help Needed   Shopping for personal items (toiletries/medicines) Help Needed   Shopping for groceries Help Needed   Driving Help Needed   Climbing a flight of stairs Help Needed   Getting to places beyond walking distances Help Needed       Health Maintenance reviewed and discussed and ordered per Provider. Health Maintenance Due   Topic Date Due    Foot Exam Q1  Never done    MICROALBUMIN Q1  Never done    Eye Exam Retinal or Dilated  Never done    DTaP/Tdap/Td series (1 - Tdap) Never done    Medicare Yearly Exam  03/10/2022    Lipid Screen  03/15/2022   . Coordination of Care:  1. Have you been to the ER, urgent care clinic since your last visit? Hospitalized since your last visit? no    2. Have you seen or consulted any other health care providers outside of the [address] [place] [address] since your last visit? Include any pap smears or colon screening. no    3. For patients aged 39-70: Has the patient had a colonoscopy? NA - based on age     If the patient is female:    4. For patients aged 41-77: Has the patient had a mammogram within the past 2 years? NA - based on age    11. For patients aged 21-65: Has the patient had a pap smear?  NA - based on age

## 2022-03-23 ENCOUNTER — TELEPHONE (OUTPATIENT)
Dept: INTERNAL MEDICINE CLINIC | Age: 87
End: 2022-03-23

## 2022-03-23 NOTE — TELEPHONE ENCOUNTER
Shelbi Krishnan at [facility] re: paperwork needed from PT referral last 10/2021    Stated it is PT eval and PT monthly summary     She is re-faxing it today/now and asking can it please be expedited?

## 2022-03-23 NOTE — PROGRESS NOTES
HPI     Deandre Santana is here to follow-up on her type 2 diabetes, which was diagnosed within the last 6 or 8 months. She continues to have neuropathic leg pain, as well as intermittent claudication from her peripheral vascular disease. Admits she has been eating a lot of desserts until recently, then began consuming mostly fruit or sherbet. We reviewed fruit that is low in sugar, as well as which foods tend to be higher in sugar content. We discussed the possibility of Metformin, but she would really like to try to do this with dietary changes. I advised her I do not need for her to cut out all desserts if she enjoys them, but to have them in moderation. Reports she was recently treated by neurology for a urinary tract infection with fosfomycin and parenteral gentamicin. She wonders if she needs to have another recheck of her urine to document clearing. A urine sample on her usually requires a catheterization. I advised her and her daughter to check with the urologist about this.         Past Medical History:   Diagnosis Date    A-fib [facility])     Arthritis     Breast cancer ([facility] 2002    left breast    CAD (coronary artery disease)     Cancer ([facility]     breast and colon    Colon cancer ([facility] 2007    Coronary stent patent 2016    GERD (gastroesophageal reflux disease)     High cholesterol     Hypertension     Pacemaker 12/2018    Peripheral neuropathy     hands    Urinary incontinence         Past Surgical History:   Procedure Laterality Date    HX APPENDECTOMY      HX BREAST BIOPSY Left 2006    lumpectomy    HX CARPAL TUNNEL RELEASE Left     HX CORONARY [address] 09/09/2016    HX HYSTERECTOMY      HX OTHER SURGICAL      bladder surgeries x3    HX PACEMAKER PLACEMENT  12/07/2018    [place] ABDOMEN SURGERY PROC UNLISTED  2006    colon resection with anastomosis        Current Outpatient Medications   Medication Sig Dispense Refill    fosfomycin (MONUROL) 3 gram pack oral packet Take one packet now with water/juice and take 2nd packet in 48 hours after first dose 2 Packet 0    acetaminophen (Tylenol Arthritis Pain) 650 mg TbER Take 650 mg by mouth every eight (8) hours.  losartan (COZAAR) 25 mg tablet TAKE 1 TABLET BY MOUTH DAILY. 90 Tablet 0    OTHER D-mannose PO      Premarin 0.625 mg/gram vaginal cream APPLY A SMALL AMOUNT TO AFFECTED AREA EVERY NIGHT AS DIRECTED 30 g 3    loperamide (IMODIUM) 2 mg capsule TAKE 1 CAPSULE BY MOUTH DAILY. 90 Capsule 1    amLODIPine (NORVASC) 5 mg tablet TAKE 1 TABLET BY MOUTH ONCE A DAY      DERMOTIC OIL 0.01 % drop APPLY 1-2 DROPS PER EAR AS NEEDED      albuterol (PROVENTIL HFA, VENTOLIN HFA, PROAIR HFA) 90 mcg/actuation inhaler Take 2 Puffs by inhalation every six (6) hours as needed for Wheezing. 1 Inhaler 3    B infantis/B ani/B lorene/B bifid (PROBIOTIC 4X PO) Take  by mouth.  cranberry extract 650 mg cap Take 1 Cap by mouth daily.  ketoconazole (NIZORAL) 2 % shampoo Use as directed daily prn 1 Bottle 5    amLODIPine (NORVASC) 10 mg tablet Take 10 mg by mouth daily.  apixaban (ELIQUIS) 2.5 mg tablet Take 2.5 mg by mouth two (2) times a day.  sotalol (BETAPACE) 80 mg tablet Take 40 mg by mouth daily.  rosuvastatin (CRESTOR) 10 mg tablet Take 10 mg by mouth nightly.  nitroglycerin (NITROSTAT) 0.4 mg SL tablet One tab sublingual every 5 min for relief of pain x 3 as necessary. Report to ER if pain lasts longer than 30 min      methenamine hippurate (HIPREX) 1 gram tablet Take 1 Tablet by mouth two (2) times daily (with meals). 30 Tablet 3    pregabalin (Lyrica) 100 mg capsule Take  by mouth two (2) times a day.  nirmatrelvir-ritonavir (PAXLOVID) 300 mg (150 mg x 2)-100 mg tablet Take 1 dose (2 tablets of emtrelivirplus 1 tablet of ritonavir) p.o. twice daily for 5 days.  (Patient not taking: Reported on 3/21/2022) 1 Box 0    OTHER [facility]          Allergies   Allergen Reactions    Ciprofloxacin Nausea and Vomiting    Doxycycline Nausea and Vomiting    Novocain [Procaine] Swelling    Sulfa (Sulfonamide Antibiotics) Rash        Social History     Socioeconomic History    Marital status:      Spouse name: Not on file    Number of children: Not on file    Years of education: Not on file    Highest education level: Not on file   Occupational History    Occupation: retired   Tobacco Use    Smoking status: Former Smoker     Types: Cigarettes     Quit date: 1983     Years since quittin.6    Smokeless tobacco: Never Used   Vaping Use    Vaping Use: Never used   Substance and Sexual Activity    Alcohol use: Yes     Comment: rare    Drug use: No    Sexual activity: Not Currently   Other Topics Concern    Not on file   Social History Narrative    Not on file     Social Determinants of Health     Financial Resource Strain:     Difficulty of Paying Living Expenses: Not on file   Food Insecurity:     Worried About [address] in the Last Year: Not on file    [address] Fit with Friends in the Last Year: Not on file   Transportation Needs:     Lack of Transportation (Medical): Not on file    Lack of Transportation (Non-Medical):  Not on file   Physical Activity:     Days of Exercise per Week: Not on file    Minutes of Exercise per Session: Not on file   Stress:     Feeling of Stress : Not on file   Social Connections:     Frequency of Communication with Friends and Family: Not on file    Frequency of Social Gatherings with Friends and Family: Not on file    Attends Restoration Services: Not on file    Active Member of Clubs or Organizations: Not on file    Attends Club or Organization Meetings: Not on file    Marital Status: Not on file   Intimate Partner Violence:     Fear of Current or Ex-Partner: Not on file    Emotionally Abused: Not on file    Physically Abused: Not on file    Sexually Abused: Not on file   Housing Stability:     Unable to Pay for Housing in the Last Year: Not on file    Number of Places Lived in the Last Year: Not on file    Unstable Housing in the Last Year: Not on file        Family History   Problem Relation Age of Onset    Stroke Mother     Heart Disease Father         chf    Cancer Sister            Visit Vitals  BP (!) 137/50   Pulse 71   Temp 97.3 °F (36.3 °C) (Temporal)   Resp 18   Ht 4' 11\" (1.499 m)   Wt 120 lb 3.2 oz (54.5 kg)   SpO2 96%   BMI 24.28 kg/m²        Review of Systems   Eyes: Negative for blurred vision. Respiratory: Negative for shortness of breath. Cardiovascular: Negative for chest pain. Gastrointestinal: Negative for blood in stool. Genitourinary: Negative for hematuria. Neurological: Negative for headaches. Endo/Heme/Allergies: Negative for polydipsia. Physical Exam  Constitutional:       General: She is not in acute distress. Appearance: She is not toxic-appearing. Eyes:      General: No scleral icterus. Conjunctiva/sclera: Conjunctivae normal.   Neck:      Comments: biateral carotid upstrokes normal to palpation. Lymph: No cervical or supraclavicular lymphadenopathy. Cardiovascular:      Rate and Rhythm: Normal rate and regular rhythm. Heart sounds: No friction rub. No gallop. Pulmonary:      Effort: Pulmonary effort is normal.      Breath sounds: Normal breath sounds. Abdominal:      General: Abdomen is flat. Palpations: Abdomen is soft. Tenderness: There is no abdominal tenderness. There is no right CVA tenderness or left CVA tenderness. Musculoskeletal:      Cervical back: Neck supple. Comments: Clubbing, cyanosis, or edema. Decreased pedal pulses, but extremities are pink and warm. Skin:     General: Skin is warm and dry. Neurological:      Mental Status: She is alert and oriented to person, place, and time. Psychiatric:         Mood and Affect: Mood normal.                  Diagnoses and all orders for this visit:    1.  Type 2 diabetes mellitus without complication, without long-term current use of insulin (HCC)  Comments:  Recent diagnosis, with hemoglobin A1c at 7.6% reviewed options of med or diet change, patient elects latter and will limit concentrated sweets, A1c next visit    2. Recent urinary tract infection  Comments:  Advised to check with urology whether or not a repeat urine is indicated. Follow-up and Dispositions    · Return in about 3 months (around 6/21/2022) for mcwl/ DM.               Cachorro Gay MD

## 2022-03-30 RX ORDER — LOPERAMIDE HYDROCHLORIDE 2 MG/1
CAPSULE ORAL
Qty: 30 CAPSULE | Refills: 0 | Status: SHIPPED | OUTPATIENT
Start: 2022-03-30 | End: 2022-10-11 | Stop reason: SDUPTHER

## 2022-03-30 RX ORDER — LOSARTAN POTASSIUM 25 MG/1
TABLET ORAL
Qty: 30 TABLET | Refills: 0 | Status: SHIPPED | OUTPATIENT
Start: 2022-03-30

## 2022-04-04 DIAGNOSIS — R53.1 WEAKNESS GENERALIZED: Primary | ICD-10-CM

## 2022-04-04 DIAGNOSIS — R26.89 BALANCE DISORDER: ICD-10-CM

## 2022-04-04 NOTE — TELEPHONE ENCOUNTER
Wiliam Bermudez calling and says she doesn't have the paperwork. Not sure what happened to it. Wants it resent. Says they need it by tomorrow to get patient in this week.

## 2022-05-11 ENCOUNTER — TELEPHONE (OUTPATIENT)
Dept: INTERNAL MEDICINE CLINIC | Age: 87
End: 2022-05-11

## 2022-05-11 NOTE — TELEPHONE ENCOUNTER
Yes, she should get the second booster. The virus is constantly changing, so people who have had COVID in the past are not necessarily immune to getting it again.   She can get the vaccine at any time

## 2022-05-11 NOTE — TELEPHONE ENCOUNTER
Pt's daughter calling. Asking if Dr Sharon Olsen would recommend pt getting the 2nd booster. Stated pt had both pfizer vaccines and 1 booster (the booster approx 08/2021)    Claudean Flair pt also was positive for covid in 02/2022    Her question is does not pt have immunities from all the vaccines and having had covid? Does Dr Sharon Olsen recommend she get the 2nd booster and if so when should she get it?

## 2022-06-12 NOTE — PROGRESS NOTES
HPI     Deysi Joel is here with her daughter. She is due for Medicare wellness/advanced care planning. Her daughter reports she brought in living will and DNR forms previously. Mrs. Yesenia Hansen has lost weight, which she attributes to being more careful about her intake of sweets. Past Medical History:   Diagnosis Date    A-fib [facility])     Arthritis     Breast cancer ([facility] 2002    left breast    CAD (coronary artery disease)     Cancer ([facility]     breast and colon    Colon cancer ([facility] 2007    Coronary stent patent 2016    GERD (gastroesophageal reflux disease)     High cholesterol     Hypertension     Pacemaker 12/2018    Peripheral neuropathy     hands    Urinary incontinence         Past Surgical History:   Procedure Laterality Date    HX APPENDECTOMY      HX BREAST BIOPSY Left 2006    lumpectomy    HX CARPAL TUNNEL RELEASE Left     HX CORONARY [address], 09/09/2016    HX HYSTERECTOMY      HX OTHER SURGICAL      bladder surgeries x3    HX PACEMAKER PLACEMENT  12/07/2018    MI ABDOMEN SURGERY PROC UNLISTED  2006    colon resection with anastomosis        Current Outpatient Medications   Medication Sig Dispense Refill    OTHER UQORA      methenamine hippurate (HIPREX) 1 gram tablet Take 1 Tablet by mouth two (2) times daily (with meals). 180 Tablet 3    losartan (COZAAR) 25 mg tablet TAKE 1 TABLET BY MOUTH DAILY. 30 Tablet 0    loperamide (IMODIUM) 2 mg capsule TAKE 1 CAPSULE BY MOUTH DAILY. 30 Capsule 0    acetaminophen (Tylenol Arthritis Pain) 650 mg TbER Take 650 mg by mouth every eight (8) hours.       OTHER D-mannose PO      Premarin 0.625 mg/gram vaginal cream APPLY A SMALL AMOUNT TO AFFECTED AREA EVERY NIGHT AS DIRECTED 30 g 3    OTHER [facility] [employer] [facility]      DERMOTIC OIL 0.01 % drop APPLY 1-2 DROPS PER EAR AS NEEDED      albuterol (PROVENTIL HFA, VENTOLIN HFA, PROAIR HFA) 90 mcg/actuation inhaler Take 2 Puffs by inhalation every six (6) hours as needed for Wheezing. 1 Inhaler 3    B infantis/B ani/B lorene/B bifid (PROBIOTIC 4X PO) Take  by mouth.  cranberry extract 650 mg cap Take 1 Cap by mouth daily.  ketoconazole (NIZORAL) 2 % shampoo Use as directed daily prn 1 Bottle 5    apixaban (ELIQUIS) 2.5 mg tablet Take 2.5 mg by mouth two (2) times a day.  sotalol (BETAPACE) 80 mg tablet Take 40 mg by mouth daily.  rosuvastatin (CRESTOR) 10 mg tablet Take 10 mg by mouth nightly.  nitroglycerin (NITROSTAT) 0.4 mg SL tablet One tab sublingual every 5 min for relief of pain x 3 as necessary. Report to ER if pain lasts longer than 30 min          Allergies   Allergen Reactions    Ciprofloxacin Nausea and Vomiting    Doxycycline Nausea and Vomiting    Novocain [Procaine] Swelling    Sulfa (Sulfonamide Antibiotics) Rash        Social History     Socioeconomic History    Marital status:      Spouse name: Not on file    Number of children: Not on file    Years of education: Not on file    Highest education level: Not on file   Occupational History    Occupation: retired   Tobacco Use    Smoking status: Former Smoker     Types: Cigarettes     Quit date: 1983     Years since quittin.9    Smokeless tobacco: Never Used   Vaping Use    Vaping Use: Never used   Substance and Sexual Activity    Alcohol use: Yes     Comment: rare    Drug use: No    Sexual activity: Not Currently   Other Topics Concern    Not on file   Social History Narrative    Not on file     Social Determinants of Health     Financial Resource Strain:     Difficulty of Paying Living Expenses: Not on file   Food Insecurity:     Worried About [address] in the Last Year: Not on file    [address] in the Last Year: Not on file   Transportation Needs:     Lack of Transportation (Medical): Not on file    Lack of Transportation (Non-Medical):  Not on file   Physical Activity:     Days of Exercise per Week: Not on file    Minutes of Exercise per Session: Not on file   Stress:     Feeling of Stress : Not on file   Social Connections:     Frequency of Communication with Friends and Family: Not on file    Frequency of Social Gatherings with Friends and Family: Not on file    Attends [employer] Services: Not on file    Active Member of [address] [place] [address] or Organizations: Not on file    Attends Club or Organization Meetings: Not on file    Marital Status: Not on file   Intimate Partner Violence:     Fear of Current or Ex-Partner: Not on file    Emotionally Abused: Not on file    Physically Abused: Not on file    Sexually Abused: Not on file   Housing Stability:     Unable to Pay for Housing in the Last Year: Not on file    Number of [place] in the Last Year: Not on file    Unstable Housing in the Last Year: Not on file        Family History   Problem Relation Age of Onset    Stroke Mother     Heart Disease Father         chf    Cancer Sister            Visit Vitals  BP (!) 127/48   Pulse 66   Temp (!) 96.4 °F (35.8 °C) (Temporal)   Resp 18   Ht 4' 8\" (1.422 m)   Wt 120 lb (54.4 kg)   SpO2 96%   BMI 26.90 kg/m²        Review of Systems   Constitutional: Negative for fever. Eyes: Negative for blurred vision. Respiratory: Negative for shortness of breath. Cardiovascular: Negative for chest pain. Gastrointestinal: Negative for blood in stool. Denies loss of appetite, dysphagia, or dysgeusia   Genitourinary: Negative for hematuria. Neurological: Negative for tingling. Psychiatric/Behavioral: Negative for depression and memory loss. Physical Exam  Constitutional:       General: She is not in acute distress. Comments: Appears much younger than stated age. Weight down 2 pounds in 6 months. Eyes:      General: No scleral icterus. Conjunctiva/sclera: Conjunctivae normal.   Neck:      Comments: Bilateral carotid upstrokes normal to palpation. Lymph: No cervical, supraclavicular, or axillary lymphadenopathy.   Cardiovascular: Rate and Rhythm: Normal rate and regular rhythm. Heart sounds: No friction rub. No gallop. Pulmonary:      Effort: Pulmonary effort is normal.      Breath sounds: Normal breath sounds. Abdominal:      Palpations: Abdomen is soft. There is no mass. Tenderness: There is no abdominal tenderness. Musculoskeletal:      Cervical back: Neck supple. Comments: No c/c/e, calves NT, no cords   Skin:     General: Skin is warm and dry. Neurological:      Mental Status: She is alert and oriented to person, place, and time. Psychiatric:         Mood and Affect: Mood normal.          Fingerstick hemoglobin A1c 7.3%        Diagnoses and all orders for this visit:    1. Medicare annual wellness visit, subsequent  Comments:  Up-to-date pneumonia vaccines, COVID-vaccine, flu shot, and Shingrix. No further colonoscopy or Pap smears indicated    2. Advanced directives, counseling/discussion  -     ADVANCE CARE PLANNING FIRST 30 MINS    3. Type 2 diabetes mellitus without complication, without long-term current use of insulin (HCC)  Comments:  A1c improved from 7.6 to 7.3%. Continue to avoid concentrated sweets, keep weight down, check fingerstick next visit. Orders:  -     AMB POC HEMOGLOBIN A1C         Follow-up and Dispositions    · Return in about 4 months (around 10/13/2022) for DM. Carlos Coleman MD   This is the Subsequent Medicare Annual Wellness Exam, performed 12 months or more after the Initial AWV or the last Subsequent AWV    I have reviewed the patient's medical history in detail and updated the computerized patient record. Assessment/Plan   Education and counseling provided:  Are appropriate based on today's review and evaluation    1. Advanced directives, counseling/discussion  2.  Medicare annual wellness visit, subsequent       Depression Risk Factor Screening     3 most recent PHQ Screens 3/21/2022   Little interest or pleasure in doing things Not at all   Feeling down, depressed, irritable, or hopeless Not at all   Total Score PHQ 2 0       Alcohol & Drug Abuse Risk Screen    Do you average more than 1 drink per night or more than 7 drinks a week:  No    On any one occasion in the past three months have you have had more than 3 drinks containing alcohol:  No          Functional Ability and Level of Safety    Hearing: The patient wears hearing aids. Activities of Daily Living: The home contains: grab bars  Patient does total self care      Ambulation: with difficulty, uses a walker     Fall Risk:  Fall Risk Assessment, last 12 mths 3/21/2022   Able to walk? Yes   Fall in past 12 months? 1   Do you feel unsteady? 0   Are you worried about falling 0   Is TUG test greater than 12 seconds? 0   Is the gait abnormal? 0   Number of falls in past 12 months 2   Fall with injury?  0      Abuse Screen:  Patient is not abused       Cognitive Screening    Has your family/caregiver stated any concerns about your memory: yes - daughter     Cognitive Screening: Normal - Mini Cog Test    Health Maintenance Due     Health Maintenance Due   Topic Date Due    Foot Exam Q1  Never done    MICROALBUMIN Q1  Never done    Eye Exam Retinal or Dilated  Never done    DTaP/Tdap/Td series (1 - Tdap) Never done    Medicare Yearly Exam  03/10/2022       Patient Care Team   Patient Care Team:  Isabel Smith MD as PCP - General (Internal Medicine Physician)  Isabel Smith MD as PCP - [facility] Empaneled Provider  Saray Brown MD (Cardiovascular Disease Physician)  Jonette Councilman, MD (Gynecology)  Delroy Fleischer, [address] (Physician Assistant)  Carlos Parry MD (Urology)  Brooke Hudson MD (Ophthalmology)  Casey Dior MD as Physician (Nephrology)    History     Patient Active Problem List   Diagnosis Code    Bulge of cervical disc without myelopathy M50.20    Bulge of thoracic disc without myelopathy M51.24    Bulge of lumbar disc without myelopathy M51.26    Cervical spondylosis without myelopathy M47.812    Thoracic spondylosis without myelopathy M47.814    Lumbosacral spondylosis without myelopathy M47.817    Spinal stenosis of lumbar region M48.061    Cervical neuritis M54.12    Lumbar neuritis M54.16    DDD (degenerative disc disease), cervical M50.30    DDD (degenerative disc disease), thoracic M51.34    DDD (degenerative disc disease), lumbar M51.36    Coronary artery disease involving native coronary artery of native heart without angina pectoris I25.10    Recurrent UTI N39.0    DDD (degenerative disc disease), lumbosacral M51.37    Essential hypertension I10    Other cervical disc displacement, unspecified cervical region M50.20    Other intervertebral disc displacement, thoracic region M51.24    Other intervertebral disc displacement, lumbar region M51.26    Spondylosis without myelopathy or radiculopathy, cervical region M47.812    Personal history of colon cancer Z85.038    Incontinence of feces R15.9    Atrial fibrillation (HCC) I48.91    Overactive bladder N32.81    Type 2 diabetes mellitus E11.9     Past Medical History:   Diagnosis Date    [facility]     Arthritis     Breast cancer ([facility] 2002    left breast    CAD (coronary artery disease)     Cancer ([facility]     breast and colon    Colon cancer ([facility] 2007    Coronary stent patent 2016    GERD (gastroesophageal reflux disease)     High cholesterol     Hypertension     Pacemaker 12/2018    Peripheral neuropathy     hands    Urinary incontinence       Past Surgical History:   Procedure Laterality Date    HX APPENDECTOMY      HX BREAST BIOPSY Left 2006    lumpectomy    HX CARPAL TUNNEL RELEASE Left     HX CORONARY STENT PLACEMENT  1999, 09/09/2016    HX HYSTERECTOMY      HX OTHER SURGICAL      bladder surgeries x3    HX PACEMAKER PLACEMENT  12/07/2018    UT ABDOMEN SURGERY PROC UNLISTED  2006    colon resection with anastomosis     Current Outpatient Medications   Medication Sig Dispense Refill    cephALEXin (Keflex) 250 mg capsule Take 1 Capsule by mouth daily for 30 days. 30 Capsule 0    losartan (COZAAR) 25 mg tablet TAKE 1 TABLET BY MOUTH DAILY. 30 Tablet 0    loperamide (IMODIUM) 2 mg capsule TAKE 1 CAPSULE BY MOUTH DAILY. 30 Capsule 0    acetaminophen (Tylenol Arthritis Pain) 650 mg TbER Take 650 mg by mouth every eight (8) hours.  OTHER D-mannose PO      Premarin 0.625 mg/gram vaginal cream APPLY A SMALL AMOUNT TO AFFECTED AREA EVERY NIGHT AS DIRECTED 30 g 3    OTHER [employer] [facility]      DERMOTIC OIL 0.01 % drop APPLY 1-2 DROPS PER EAR AS NEEDED      albuterol (PROVENTIL HFA, VENTOLIN HFA, PROAIR HFA) 90 mcg/actuation inhaler Take 2 Puffs by inhalation every six (6) hours as needed for Wheezing. 1 Inhaler 3    B infantis/B ani/B lorene/B bifid (PROBIOTIC 4X PO) Take  by mouth.  cranberry extract 650 mg cap Take 1 Cap by mouth daily.  ketoconazole (NIZORAL) 2 % shampoo Use as directed daily prn 1 Bottle 5    apixaban (ELIQUIS) 2.5 mg tablet Take 2.5 mg by mouth two (2) times a day.  sotalol (BETAPACE) 80 mg tablet Take 40 mg by mouth daily.  rosuvastatin (CRESTOR) 10 mg tablet Take 10 mg by mouth nightly.  nitroglycerin (NITROSTAT) 0.4 mg SL tablet One tab sublingual every 5 min for relief of pain x 3 as necessary.   Report to ER if pain lasts longer than 30 min       Allergies   Allergen Reactions    Ciprofloxacin Nausea and Vomiting    Doxycycline Nausea and Vomiting    Novocain [Procaine] Swelling    Sulfa (Sulfonamide Antibiotics) Rash       Family History   Problem Relation Age of Onset    Stroke Mother     Heart Disease Father         chf    Cancer Sister      Social History     Tobacco Use    Smoking status: Former Smoker     Types: Cigarettes     Quit date: 1983     Years since quittin.9    Smokeless tobacco: Never Used   Substance Use Topics    Alcohol use: Yes     Comment: rare [place], MD

## 2022-06-12 NOTE — PATIENT INSTRUCTIONS
Medicare Wellness Visit, Female     The best way to live healthy is to have a lifestyle where you eat a well-balanced diet, exercise regularly, limit alcohol use, and quit all forms of tobacco/nicotine, if applicable. Regular preventive services are another way to keep healthy. Preventive services (vaccines, screening tests, monitoring & exams) can help personalize your care plan, which helps you manage your own care. Screening tests can find health problems at the earliest stages, when they are easiest to treat. [facility] follows the current, evidence-based guidelines published by the [facility] ([facility]) when recommending preventive services for our patients. Because we follow these guidelines, sometimes recommendations change over time as research supports it. (For example, mammograms used to be recommended annually. Even though Medicare will still pay for an annual mammogram, the newer guidelines recommend a mammogram every two years for women of average risk). Of course, you and your doctor may decide to screen more often for some diseases, based on your risk and your co-morbidities (chronic disease you are already diagnosed with). Preventive services for you include:  - Medicare offers their members a free annual wellness visit, which is time for you and your primary care provider to discuss and plan for your preventive service needs. Take advantage of this benefit every year!  -All adults over the age of 72 should receive the recommended pneumonia vaccines. Current USPSTF guidelines recommend a series of two vaccines for the best pneumonia protection.   -All adults should have a flu vaccine yearly and a tetanus vaccine every 10 years.   -All adults age 48 and older should receive the shingles vaccines (series of two vaccines).       -All adults age 38-68 who are overweight should have a diabetes screening test once every three years.   -All adults born between [birth year] and 1965 should be screened once for Hepatitis C.  -Other screening tests and preventive services for persons with diabetes include: an eye exam to screen for diabetic retinopathy, a kidney function test, a foot exam, and stricter control over your cholesterol.   -Cardiovascular screening for adults with routine risk involves an electrocardiogram (ECG) at intervals determined by your doctor.   -Colorectal cancer screenings should be done for adults age 54-65 with no increased risk factors for colorectal cancer. There are a number of acceptable methods of screening for this type of cancer. Each test has its own benefits and drawbacks. Discuss with your doctor what is most appropriate for you during your annual wellness visit. The different tests include: colonoscopy (considered the best screening method), a fecal occult blood test, a fecal DNA test, and sigmoidoscopy.    -A bone mass density test is recommended when a woman turns 65 to screen for osteoporosis. This test is only recommended one time, as a screening. Some providers will use this same test as a disease monitoring tool if you already have osteoporosis. -Breast cancer screenings are recommended every other year for women of normal risk, age 54-69.  -Cervical cancer screenings for women over age 72 are only recommended with certain risk factors.      Here is a list of your current Health Maintenance items (your personalized list of preventive services) with a due date:  Health Maintenance Due   Topic Date Due    Diabetic Foot Care  Never done    Albumin Urine Test  Never done    Eye Exam  Never done    DTaP/Tdap/Td  (1 - Tdap) Never done    Annual Well Visit  03/10/2022

## 2022-06-12 NOTE — ACP (ADVANCE CARE PLANNING)
Advance Care Planning     Advance Care Planning (ACP) Physician/NP/PA Conversation      Date of Conversation: 6/13/2022  Conducted with: Patient with [address] Decision Maker:     Primary Decision Maker: Maggy Bashir - [phone]  Click here to complete [address] including selection of the Healthcare Decision Maker Relationship (ie \"Primary\")        Today we documented Decision Maker(s) consistent with Legal Next of Kin hierarchy. Care Preferences:    Hospitalization: \"If your health worsens and it becomes clear that your chance of recovery is unlikely, what would be your preference regarding hospitalization? \"  The patient would prefer comfort-focused treatment without hospitalization. Ventilation: \"If you were unable to breathe on your own and your chance of recovery was unlikely, what would be your preference about the use of a ventilator (breathing machine) if it was available to you? \"   The patient would NOT desire the use of a ventilator. Resuscitation: \"In the event your heart stopped as a result of an underlying serious health condition, would you want attempts to be made to restart your heart, or would you prefer a natural death? \"   No, do NOT attempt to resuscitate.     Additional topics discussed: artificial nutrition    Conversation Outcomes / Follow-Up Plan:   ACP complete - no further action today  Reviewed DNR/DNI and patient confirms current DNR status - completed forms on file (place new order if needed)     Length of Voluntary ACP Conversation in minutes:  16 minutes    Lamar Pierson MD

## 2022-06-13 ENCOUNTER — OFFICE VISIT (OUTPATIENT)
Dept: INTERNAL MEDICINE CLINIC | Age: 87
End: 2022-06-13
Payer: MEDICARE

## 2022-06-13 VITALS
DIASTOLIC BLOOD PRESSURE: 48 MMHG | WEIGHT: 120 LBS | TEMPERATURE: 96.4 F | RESPIRATION RATE: 18 BRPM | HEART RATE: 66 BPM | SYSTOLIC BLOOD PRESSURE: 127 MMHG | OXYGEN SATURATION: 96 % | HEIGHT: 56 IN | BODY MASS INDEX: 26.99 KG/M2

## 2022-06-13 DIAGNOSIS — Z00.00 MEDICARE ANNUAL WELLNESS VISIT, SUBSEQUENT: Primary | ICD-10-CM

## 2022-06-13 DIAGNOSIS — Z71.89 ADVANCED DIRECTIVES, COUNSELING/DISCUSSION: ICD-10-CM

## 2022-06-13 DIAGNOSIS — E11.9 TYPE 2 DIABETES MELLITUS WITHOUT COMPLICATION, WITHOUT LONG-TERM CURRENT USE OF INSULIN (HCC): ICD-10-CM

## 2022-06-13 LAB — HBA1C MFR BLD HPLC: 7.3 %

## 2022-06-13 PROCEDURE — 1090F PRES/ABSN URINE INCON ASSESS: CPT | Performed by: INTERNAL MEDICINE

## 2022-06-13 PROCEDURE — 99213 OFFICE O/P EST LOW 20 MIN: CPT | Performed by: INTERNAL MEDICINE

## 2022-06-13 PROCEDURE — G8427 DOCREV CUR MEDS BY ELIG CLIN: HCPCS | Performed by: INTERNAL MEDICINE

## 2022-06-13 PROCEDURE — G8510 SCR DEP NEG, NO PLAN REQD: HCPCS | Performed by: INTERNAL MEDICINE

## 2022-06-13 PROCEDURE — G8417 CALC BMI ABV UP PARAM F/U: HCPCS | Performed by: INTERNAL MEDICINE

## 2022-06-13 PROCEDURE — 99497 ADVNCD CARE PLAN 30 MIN: CPT | Performed by: INTERNAL MEDICINE

## 2022-06-13 PROCEDURE — 1123F ACP DISCUSS/DSCN MKR DOCD: CPT | Performed by: INTERNAL MEDICINE

## 2022-06-13 PROCEDURE — 1101F PT FALLS ASSESS-DOCD LE1/YR: CPT | Performed by: INTERNAL MEDICINE

## 2022-06-13 PROCEDURE — G8536 NO DOC ELDER MAL SCRN: HCPCS | Performed by: INTERNAL MEDICINE

## 2022-06-13 PROCEDURE — 3051F HG A1C>EQUAL 7.0%<8.0%: CPT | Performed by: INTERNAL MEDICINE

## 2022-06-13 PROCEDURE — G0463 HOSPITAL OUTPT CLINIC VISIT: HCPCS | Performed by: INTERNAL MEDICINE

## 2022-06-13 PROCEDURE — 83036 HEMOGLOBIN GLYCOSYLATED A1C: CPT | Performed by: INTERNAL MEDICINE

## 2022-06-13 PROCEDURE — G0439 PPPS, SUBSEQ VISIT: HCPCS | Performed by: INTERNAL MEDICINE

## 2022-06-13 NOTE — PROGRESS NOTES
Marjan Rater presents today for No chief complaint on file. Is someone accompanying this pt? Yes, daughter    Is the patient using any DME equipment during [address]? walker    Depression Screening:  3 most recent PHQ Screens 6/13/2022   Little interest or pleasure in doing things Not at all   Feeling down, depressed, irritable, or hopeless Not at all   Total Score PHQ 2 0       Learning Assessment:  Learning Assessment 9/18/2019   PRIMARY LEARNER Patient   HIGHEST LEVEL OF EDUCATION - PRIMARY LEARNER  -   BARRIERS PRIMARY LEARNER -   CO-LEARNER CAREGIVER -   PRIMARY LANGUAGE ENGLISH   LEARNER PREFERENCE PRIMARY LISTENING   ANSWERED BY patient   RELATIONSHIP SELF       Abuse Screening:  Abuse Screening Questionnaire 6/13/2022   Do you ever feel afraid of your partner? N   Are you in a relationship with someone who physically or mentally threatens you? N   Is it safe for you to go home? Y       Fall Risk  Fall Risk Assessment, last 12 mths 6/13/2022   Able to walk? Yes   Fall in past 12 months? 0   Do you feel unsteady? 0   Are you worried about falling 0   Is TUG test greater than 12 seconds?  -   Is the gait abnormal? -   Number of falls in past 12 months -   Fall with injury? -       ADL  ADL Assessment 3/29/2021   Feeding yourself No Help Needed   Getting from bed to chair No Help Needed   Getting dressed No Help Needed   Bathing or showering No Help Needed   Walk across the room (includes cane/walker) Help Needed   Using the telphone No Help Needed   Taking your medications No Help Needed   Preparing meals Help Needed   Managing money (expenses/bills) Help Needed   Moderately strenuous housework (laundry) Help Needed   Shopping for personal items (toiletries/medicines) Help Needed   Shopping for groceries Help Needed   Driving Help Needed   Climbing a flight of stairs Help Needed   Getting to places beyond walking distances Help Needed       Health Maintenance reviewed and discussed and ordered per Provider. Health Maintenance Due   Topic Date Due    Foot Exam Q1  Never done    MICROALBUMIN Q1  Never done    Eye Exam Retinal or Dilated  Never done    DTaP/Tdap/Td series (1 - Tdap) Never done    Medicare Yearly Exam  03/10/2022   .    1. \"Have you been to the ER, urgent care clinic since your last visit? Hospitalized since your last visit? \" No    2. \"Have you seen or consulted any other health care providers outside of the [address] [place] [address] since your last visit? \" No     3. For patients aged 39-70: Has the patient had a colonoscopy / FIT/ Cologuard? NA - based on age      If the patient is female:    4. For patients aged 41-77: Has the patient had a mammogram within the past 2 years? NA - based on age or sex      11. For patients aged 21-65: Has the patient had a pap smear?  NA - based on age or sex

## 2022-07-19 RX ORDER — LOPERAMIDE HYDROCHLORIDE 2 MG/1
CAPSULE ORAL
Qty: 30 CAPSULE | Refills: 0 | OUTPATIENT
Start: 2022-07-19

## 2022-07-29 ENCOUNTER — TELEPHONE (OUTPATIENT)
Dept: INTERNAL MEDICINE CLINIC | Age: 87
End: 2022-07-29

## 2022-08-17 ENCOUNTER — OFFICE VISIT (OUTPATIENT)
Dept: INTERNAL MEDICINE CLINIC | Age: 87
End: 2022-08-17
Payer: MEDICARE

## 2022-08-17 ENCOUNTER — TELEPHONE (OUTPATIENT)
Dept: INTERNAL MEDICINE CLINIC | Age: 87
End: 2022-08-17

## 2022-08-17 VITALS
TEMPERATURE: 97.1 F | DIASTOLIC BLOOD PRESSURE: 52 MMHG | RESPIRATION RATE: 16 BRPM | HEART RATE: 63 BPM | BODY MASS INDEX: 25.87 KG/M2 | OXYGEN SATURATION: 96 % | WEIGHT: 115 LBS | SYSTOLIC BLOOD PRESSURE: 112 MMHG | HEIGHT: 56 IN

## 2022-08-17 DIAGNOSIS — M25.561 RIGHT KNEE PAIN, UNSPECIFIED CHRONICITY: Primary | ICD-10-CM

## 2022-08-17 DIAGNOSIS — E11.9 TYPE 2 DIABETES MELLITUS WITHOUT COMPLICATION, WITHOUT LONG-TERM CURRENT USE OF INSULIN (HCC): ICD-10-CM

## 2022-08-17 PROBLEM — D69.6 THROMBOCYTOPENIA, UNSPECIFIED (HCC): Status: ACTIVE | Noted: 2022-08-17

## 2022-08-17 PROBLEM — H35.3211 EXUDATIVE AGE-RELATED MACULAR DEGENERATION, RIGHT EYE, WITH ACTIVE CHOROIDAL NEOVASCULARIZATION (HCC): Status: ACTIVE | Noted: 2022-08-17

## 2022-08-17 PROBLEM — I73.9 PERIPHERAL VASCULAR DISEASE (HCC): Status: ACTIVE | Noted: 2022-08-17

## 2022-08-17 PROBLEM — J44.9 CHRONIC OBSTRUCTIVE PULMONARY DISEASE, UNSPECIFIED COPD TYPE (HCC): Status: ACTIVE | Noted: 2022-08-17

## 2022-08-17 PROCEDURE — 99213 OFFICE O/P EST LOW 20 MIN: CPT | Performed by: INTERNAL MEDICINE

## 2022-08-17 PROCEDURE — 3051F HG A1C>EQUAL 7.0%<8.0%: CPT | Performed by: INTERNAL MEDICINE

## 2022-08-17 PROCEDURE — G0463 HOSPITAL OUTPT CLINIC VISIT: HCPCS | Performed by: INTERNAL MEDICINE

## 2022-08-17 PROCEDURE — 1123F ACP DISCUSS/DSCN MKR DOCD: CPT | Performed by: INTERNAL MEDICINE

## 2022-08-17 RX ORDER — LIDOCAINE 50 MG/G
1 PATCH TOPICAL EVERY 24 HOURS
Qty: 14 EACH | Refills: 0 | Status: SHIPPED | OUTPATIENT
Start: 2022-08-17 | End: 2022-09-19

## 2022-08-17 RX ORDER — AMLODIPINE BESYLATE 10 MG/1
10 TABLET ORAL DAILY
COMMUNITY

## 2022-08-17 RX ORDER — ACETAMINOPHEN 500 MG
1000 TABLET ORAL
Qty: 120 TABLET | Refills: 0 | Status: SHIPPED | OUTPATIENT
Start: 2022-08-17

## 2022-08-17 NOTE — PROGRESS NOTES
Caitlin Barajas is a 80y.o. year old female who is a new patient to me today. She is regularly followed by Dr. Karen Lovelace. Subjective:   Caitlin Barajas was seen today for Hospital Follow Up    80year old female with history of HTN, CAD, A fib on anti coagulation with Eliquis, Osteoarthritis was seen in ER on 8/15 because of increased pain in right knee. Patient had X RAY which was negative for any fractures. She was prescribed Tylenol, 1 gram BID along with Lidocaine patch. As per daughter high dose Tylenol has helped with pain, patient was on 650 mg of Tylenol in past.    Today she is being seen as hospital follow up. Patient still complains of pain in right knee but not as much what she had on the day of ER visit. Daughter agrees hat high dose Tylenol with Lidocaine patch would help her especially when being on anti coagulation, need to avoid anti inflammatories. Also with her age want to avoid opioids for pain management. Daughter also mentioned about mild weight loss being on Diabetic diet for DM, patient is eating less.       Past Medical History:   Diagnosis Date    A-fib [facility])     Arthritis     Breast cancer ([address] 2002    left breast    CAD (coronary artery disease)     Cancer ([address]     breast and colon    Colon cancer ([address] 2007    Coronary stent patent 2016    GERD (gastroesophageal reflux disease)     High cholesterol     Hypertension     Pacemaker 12/2018    Peripheral neuropathy     hands    Urinary incontinence        Past Surgical History:   Procedure Laterality Date    HX APPENDECTOMY      HX BREAST BIOPSY Left 2006    lumpectomy    HX CARPAL TUNNEL RELEASE Left     HX CORONARY [address], 09/09/2016    HX HYSTERECTOMY      HX OTHER SURGICAL      bladder surgeries x3    HX PACEMAKER PLACEMENT  12/07/2018    MT ABDOMEN SURGERY PROC UNLISTED  2006    colon resection with anastomosis       Family History   Problem Relation Age of Onset    Stroke Mother     Heart Disease Father         chf    Cancer Sister        Social History     Socioeconomic History    Marital status:      Spouse name: Not on file    Number of children: Not on file    Years of education: Not on file    Highest education level: Not on file   Occupational History    Occupation: retired   Tobacco Use    Smoking status: Former     Types: Cigarettes     Quit date: 1983     Years since quittin.1    Smokeless tobacco: Never   Vaping Use    Vaping Use: Never used   Substance and Sexual Activity    Alcohol use: Yes     Comment: rare    Drug use: No    Sexual activity: Not Currently   Other Topics Concern    Not on file   Social History Narrative    Not on file     Social Determinants of Health     Financial Resource Strain: Not on file   Food Insecurity: Not on file   Transportation Needs: Not on file   Physical Activity: Not on file   Stress: Not on file   Social Connections: Not on file   Intimate Partner Violence: Not on file   Housing Stability: Not on file       Allergies   Allergen Reactions    Ciprofloxacin Nausea and Vomiting    Doxycycline Nausea and Vomiting    Novocain [Procaine] Swelling    Sulfa (Sulfonamide Antibiotics) Rash        Prior to Admission medications    Medication Sig Start Date End Date Taking? Authorizing Provider   amLODIPine (NORVASC) 10 mg tablet Take 10 mg by mouth daily. Yes Provider, Historical   lidocaine (LIDODERM) 5 % 1 Patch by TransDERmal route every twenty-four (24) hours. Apply patch to the affected area for 12 hours a day and remove for 12 hours a day. 22  Yes Sandra Clements MD   acetaminophen (Tylenol Extra Strength) 500 mg tablet Take 2 Tablets by mouth two (2) times daily as needed for Pain. 22  Yes Sandra Clements MD   OTHER Colon Connor   Yes Provider, Historical   methenamine hippurate (HIPREX) 1 gram tablet Take 1 Tablet by mouth two (2) times daily (with meals).  22  Yes ANUP Valladares   losartan (COZAAR) 25 mg tablet TAKE 1 TABLET BY MOUTH DAILY. 3/30/22  Yes Aishwarya Thapa MD   loperamide (IMODIUM) 2 mg capsule TAKE 1 CAPSULE BY MOUTH DAILY. 3/30/22  Yes Aishwarya Thapa MD   OTHER D-mannose PO   Yes Provider, Historical   Premarin 0.625 mg/gram vaginal cream APPLY A SMALL AMOUNT TO AFFECTED AREA EVERY NIGHT AS DIRECTED 10/11/21  Yes Daniel Raymundo PA-C   OTHER [facility]   Yes Provider, Historical   DERMOTIC OIL 0.01 % drop APPLY 1-2 DROPS PER EAR AS NEEDED 2/13/20  Yes Provider, Historical   albuterol (PROVENTIL HFA, VENTOLIN HFA, PROAIR HFA) 90 mcg/actuation inhaler Take 2 Puffs by inhalation every six (6) hours as needed for Wheezing. 2/7/20  Yes Aishwarya Thapa MD   B infantis/B ani/B lorene/B bifid (PROBIOTIC 4X PO) Take  by mouth. Yes Provider, Historical   cranberry extract 650 mg cap Take 1 Cap by mouth daily. Yes Provider, Historical   ketoconazole (NIZORAL) 2 % shampoo Use as directed daily prn 6/14/19  Yes Fawad Mcdaniels MD   [facility]) 2.5 mg tablet Take 2.5 mg by mouth two (2) times a day. 12/12/18  Yes Provider, Historical   sotalol (BETAPACE) 80 mg tablet Take 40 mg by mouth daily. 12/12/18  Yes Provider, Historical   rosuvastatin (CRESTOR) 10 mg tablet Take 10 mg by mouth nightly. 11/7/18  Yes Provider, Historical   nitroglycerin (NITROSTAT) 0.4 mg SL tablet One tab sublingual every 5 min for relief of pain x 3 as necessary. Report to ER if pain lasts longer than 30 min 10/5/17  Yes Provider, Historical   acetaminophen (TYLENOL) 650 mg TbER Take 1,000 mg by mouth two (2) times a day. 8/17/22  Provider, Historical        ROS as above        Objective:     Vitals:    08/17/22 1112 08/17/22 1116   BP: (!) 112/41 (!) 112/52   Pulse: 63    Resp: 16    Temp: 97.1 °F (36.2 °C)    TempSrc: Temporal    SpO2: 96%    Weight: 115 lb (52.2 kg)    Height: 4' 8\" (1.422 m)       Physical Exam  Constitutional:       Appearance: Normal appearance. HENT:      Head: Normocephalic and atraumatic.       Nose: Nose normal. Mouth/Throat:      Mouth: Mucous membranes are moist.   Eyes:      Conjunctiva/sclera: Conjunctivae normal.      Pupils: Pupils are equal, round, and reactive to light. Cardiovascular:      Rate and Rhythm: Normal rate. Rhythm irregular. Pulses: Normal pulses. Heart sounds: Normal heart sounds. Pulmonary:      Effort: Pulmonary effort is normal.      Breath sounds: Normal breath sounds. Abdominal:      General: Abdomen is flat. Bowel sounds are normal.      Palpations: Abdomen is soft. Musculoskeletal:      Cervical back: Normal range of motion and neck supple. Comments: Right knee, mild pain on movement. No redness. Mild effusion could not be excluded. Skin:     General: Skin is warm and dry. Neurological:      General: No focal deficit present. Mental Status: She is alert. Mental status is at baseline. Psychiatric:         Mood and Affect: Mood normal.         Behavior: Behavior normal.          Labs:     Results for orders placed or performed in visit on 06/13/22   AMB POC HEMOGLOBIN A1C   Result Value Ref Range    Hemoglobin A1c (POC) 7.3 %          Active Problems:     Patient Active Problem List    Diagnosis    Exudative age-related macular degeneration, right eye, with active choroidal neovascularization (HCC)    Chronic obstructive pulmonary disease, unspecified COPD type ([address]    Peripheral vascular disease ([address]    Thrombocytopenia, unspecified ([address]    Type 2 diabetes mellitus    Personal history of colon cancer    Incontinence of feces    Atrial fibrillation ([address]     post pacemaker       Overactive bladder     multiple urology procedure.        Other cervical disc displacement, unspecified cervical region    Other intervertebral disc displacement, thoracic region    Other intervertebral disc displacement, lumbar region    Spondylosis without myelopathy or radiculopathy, cervical region    Recurrent UTI    DDD (degenerative disc disease), lumbosacral    Essential hypertension    Coronary artery disease involving native coronary artery of native heart with angina pectoris (HCC)    Bulge of cervical disc without myelopathy    Bulge of thoracic disc without myelopathy    Bulge of lumbar disc without myelopathy    Cervical spondylosis without myelopathy    Thoracic spondylosis without myelopathy    Lumbosacral spondylosis without myelopathy    Spinal stenosis of lumbar region    Cervical neuritis    Lumbar neuritis    DDD (degenerative disc disease), cervical    DDD (degenerative disc disease), thoracic    DDD (degenerative disc disease), lumbar       Assessment & Plan:     Diagnoses and all orders for this visit:    1. Right knee pain, unspecified chronicity  Assessment & Plan:  May use Ice pack for acute pain. Avoid painful movement. 2. Type 2 diabetes mellitus without complication, without long-term current use of insulin ([address]  Assessment & Plan:  Controlled on diet for now. To repeat HBA1c and follow with PCP. Other orders  -     lidocaine (LIDODERM) 5 %; 1 Patch by TransDERmal route every twenty-four (24) hours. Apply patch to the affected area for 12 hours a day and remove for 12 hours a day. -     acetaminophen (Tylenol Extra Strength) 500 mg tablet; Take 2 Tablets by mouth two (2) times daily as needed for Pain. Follow-up and Dispositions    Return in about 2 weeks (around 8/31/2022), or if symptoms worsen or fail to improve. Disclaimer: The patient understands our medical plan. Alternatives have been explained and offered. The risks, benefits and significant side effects of all medications have been reviewed. Anticipated time course and progression of condition reviewed. All questions have been addressed. She is encouraged to employ the information provided in the after visit summary, which was reviewed.       Where applicable, she is instructed to call the clinic if she has not been notified either by phone or through [address] with the results of her tests or with an appointment plan for any referrals within 1 week(s). No news is not good news; it's no news. The patient  is to call if her condition worsens or fails to improve or if significant side effects are experienced.            Khushi Hudson MD

## 2022-08-17 NOTE — PROGRESS NOTES
Lili Martinez presents today for   Chief Complaint   Patient presents with    Hospital Follow Up       1. \"Have you been to the ER, urgent care clinic since your last visit? Hospitalized since your last visit? \" yes    2. \"Have you seen or consulted any other health care providers outside of the [address] [place] [address] since your last visit? \" [facility]     3. For patients aged 39-70: Has the patient had a colonoscopy / FIT/ Cologuard? NA - based on age      If the patient is female:    4. For patients aged 41-77: Has the patient had a mammogram within the past 2 years? NA - based on age or sex  See top three    5. For patients aged 21-65: Has the patient had a pap smear?  NA - based on age or sex

## 2022-08-18 ENCOUNTER — TELEPHONE (OUTPATIENT)
Dept: INTERNAL MEDICINE CLINIC | Age: 87
End: 2022-08-18

## 2022-08-18 NOTE — TELEPHONE ENCOUNTER
Lidoderm patches have been denied through Cover My Meds. The patches are available through [facility] at [place] and [address] [place] [address] for  #10  $22.60.   I called the patient's daughter and left a VM to call back in order to advise

## 2022-08-19 NOTE — TELEPHONE ENCOUNTER
PT daughter Junnie Carbon called into office because was concerned with the knee pain her mother was seen in ER last 8/15/22. Mrs. Blaine Bowden had O/V with on 8/17  Dr Vivian Rajput where she prescribe lidocaine patches which gives her minimal relief. Pt daughter was told by Physical Therapist  with [facility] she can do some pain and swelling reduction exercises but she needs order from Dr. Nasim Matta. Made Pt daughter made aware that order will not be sent out today. Please be advise, thank you.

## 2022-08-20 NOTE — TELEPHONE ENCOUNTER
Please send order to [facility] for physical therapy for knee pain, evaluate and treat, diagnosis knee pain

## 2022-08-22 ENCOUNTER — TELEPHONE (OUTPATIENT)
Dept: INTERNAL MEDICINE CLINIC | Age: 87
End: 2022-08-22

## 2022-08-22 DIAGNOSIS — M25.561 RIGHT KNEE PAIN, UNSPECIFIED CHRONICITY: Primary | ICD-10-CM

## 2022-08-22 NOTE — TELEPHONE ENCOUNTER
Referral was sent to [facility] in error per daughter Isidro Cason. Pt resides at [facility] so referral needs to go to [facility] because they will go to patient at [facility] so she doesn't need to leave facility. Referral is for pain mgmt of her knee pain.   [facility] fax # is [phone]

## 2022-08-22 NOTE — TELEPHONE ENCOUNTER
Please send physical therapy referral to [address] [place] [address] where she lives, they have physical therapy there. Diagnosis is right knee pain.

## 2022-09-19 ENCOUNTER — HOSPITAL ENCOUNTER (OUTPATIENT)
Dept: LAB | Age: 87
Discharge: HOME | End: 2022-09-19
Payer: MEDICARE

## 2022-09-19 ENCOUNTER — OFFICE VISIT (OUTPATIENT)
Dept: INTERNAL MEDICINE CLINIC | Age: 87
End: 2022-09-19
Payer: MEDICARE

## 2022-09-19 VITALS
OXYGEN SATURATION: 95 % | BODY MASS INDEX: 24.52 KG/M2 | RESPIRATION RATE: 16 BRPM | TEMPERATURE: 97 F | HEART RATE: 70 BPM | HEIGHT: 56 IN | WEIGHT: 109 LBS | SYSTOLIC BLOOD PRESSURE: 112 MMHG | DIASTOLIC BLOOD PRESSURE: 60 MMHG

## 2022-09-19 DIAGNOSIS — R63.4 WEIGHT LOSS: ICD-10-CM

## 2022-09-19 DIAGNOSIS — E11.9 DIABETES MELLITUS WITH NO COMPLICATION (HCC): Primary | ICD-10-CM

## 2022-09-19 DIAGNOSIS — Z51.81 THERAPEUTIC DRUG MONITORING: ICD-10-CM

## 2022-09-19 DIAGNOSIS — R53.83 OTHER FATIGUE: ICD-10-CM

## 2022-09-19 DIAGNOSIS — E11.9 DIABETES MELLITUS WITH NO COMPLICATION (HCC): ICD-10-CM

## 2022-09-19 LAB
ANION GAP SERPL CALC-SCNC: 8 MMOL/L (ref 3–18)
BASOPHILS # BLD: 0 K/UL (ref 0–0.1)
BASOPHILS NFR BLD: 0 % (ref 0–2)
BUN SERPL-MCNC: 42 MG/DL (ref 7–18)
BUN/CREAT SERPL: 48 (ref 12–20)
CALCIUM SERPL-MCNC: 10.1 MG/DL (ref 8.5–10.1)
CHLORIDE SERPL-SCNC: 101 MMOL/L (ref 100–111)
CK SERPL-CCNC: 25 U/L (ref 26–192)
CO2 SERPL-SCNC: 29 MMOL/L (ref 21–32)
CREAT SERPL-MCNC: 0.88 MG/DL (ref 0.6–1.3)
DIFFERENTIAL METHOD BLD: ABNORMAL
EOSINOPHIL # BLD: 0.2 K/UL (ref 0–0.4)
EOSINOPHIL NFR BLD: 2 % (ref 0–5)
ERYTHROCYTE [DISTWIDTH] IN BLOOD BY AUTOMATED COUNT: 13.6 % (ref 11.6–14.5)
GLUCOSE SERPL-MCNC: 345 MG/DL (ref 74–99)
HBA1C MFR BLD HPLC: 9.4 %
HCT VFR BLD AUTO: 47.1 % (ref 35–45)
HGB BLD-MCNC: 15.3 G/DL (ref 12–16)
IMM GRANULOCYTES # BLD AUTO: 0.1 K/UL (ref 0–0.04)
IMM GRANULOCYTES NFR BLD AUTO: 1 % (ref 0–0.5)
LYMPHOCYTES # BLD: 0.7 K/UL (ref 0.9–3.6)
LYMPHOCYTES NFR BLD: 7 % (ref 21–52)
MCH RBC QN AUTO: 31.4 PG (ref 24–34)
MCHC RBC AUTO-ENTMCNC: 32.5 G/DL (ref 31–37)
MCV RBC AUTO: 96.7 FL (ref 78–100)
MONOCYTES # BLD: 0.7 K/UL (ref 0.05–1.2)
MONOCYTES NFR BLD: 7 % (ref 3–10)
NEUTS SEG # BLD: 8.1 K/UL (ref 1.8–8)
NEUTS SEG NFR BLD: 83 % (ref 40–73)
NRBC # BLD: 0 K/UL (ref 0–0.01)
NRBC BLD-RTO: 0 PER 100 WBC
PLATELET # BLD AUTO: 99 K/UL (ref 135–420)
PLATELET COMMENTS,PCOM: ABNORMAL
PMV BLD AUTO: 13 FL (ref 9.2–11.8)
POTASSIUM SERPL-SCNC: 4.7 MMOL/L (ref 3.5–5.5)
RBC # BLD AUTO: 4.87 M/UL (ref 4.2–5.3)
RBC MORPH BLD: ABNORMAL
SODIUM SERPL-SCNC: 138 MMOL/L (ref 136–145)
WBC # BLD AUTO: 9.8 K/UL (ref 4.6–13.2)

## 2022-09-19 PROCEDURE — G8427 DOCREV CUR MEDS BY ELIG CLIN: HCPCS | Performed by: INTERNAL MEDICINE

## 2022-09-19 PROCEDURE — 99214 OFFICE O/P EST MOD 30 MIN: CPT | Performed by: INTERNAL MEDICINE

## 2022-09-19 PROCEDURE — 83036 HEMOGLOBIN GLYCOSYLATED A1C: CPT | Performed by: INTERNAL MEDICINE

## 2022-09-19 PROCEDURE — G8420 CALC BMI NORM PARAMETERS: HCPCS | Performed by: INTERNAL MEDICINE

## 2022-09-19 PROCEDURE — 1090F PRES/ABSN URINE INCON ASSESS: CPT | Performed by: INTERNAL MEDICINE

## 2022-09-19 PROCEDURE — G0463 HOSPITAL OUTPT CLINIC VISIT: HCPCS | Performed by: INTERNAL MEDICINE

## 2022-09-19 PROCEDURE — 85025 COMPLETE CBC W/AUTO DIFF WBC: CPT

## 2022-09-19 PROCEDURE — 82550 ASSAY OF CK (CPK): CPT

## 2022-09-19 PROCEDURE — G8432 DEP SCR NOT DOC, RNG: HCPCS | Performed by: INTERNAL MEDICINE

## 2022-09-19 PROCEDURE — 1101F PT FALLS ASSESS-DOCD LE1/YR: CPT | Performed by: INTERNAL MEDICINE

## 2022-09-19 PROCEDURE — 3046F HEMOGLOBIN A1C LEVEL >9.0%: CPT | Performed by: INTERNAL MEDICINE

## 2022-09-19 PROCEDURE — 1123F ACP DISCUSS/DSCN MKR DOCD: CPT | Performed by: INTERNAL MEDICINE

## 2022-09-19 PROCEDURE — G8536 NO DOC ELDER MAL SCRN: HCPCS | Performed by: INTERNAL MEDICINE

## 2022-09-19 PROCEDURE — 36415 COLL VENOUS BLD VENIPUNCTURE: CPT

## 2022-09-19 PROCEDURE — 80048 BASIC METABOLIC PNL TOTAL CA: CPT

## 2022-09-19 RX ORDER — INSULIN PUMP SYRINGE, 3 ML
EACH MISCELLANEOUS DAILY
Qty: 1 KIT | Refills: 0 | Status: SHIPPED | OUTPATIENT
Start: 2022-09-19

## 2022-09-19 RX ORDER — GLIPIZIDE 5 MG/1
5 TABLET ORAL 2 TIMES DAILY
Qty: 90 TABLET | Refills: 2 | Status: SHIPPED | OUTPATIENT
Start: 2022-09-19 | End: 2022-10-13 | Stop reason: DRUGHIGH

## 2022-09-19 RX ORDER — LANCETS
1 EACH MISCELLANEOUS DAILY
Qty: 1 EACH | Refills: 11 | Status: SHIPPED | OUTPATIENT
Start: 2022-09-19

## 2022-09-19 NOTE — PROGRESS NOTES
FANTASMA     Nacho Meeks is here with her daughter for recent weakness, polyuria, and concerns that her diabetes might be progressing. It has been treated with diet so far. She is not on a strict diabetic diet, but does not eat cake or cookies, usually has sherbet for dessert. Her daughter reports that she does not always go down to the dining room to eat, and she has been having food sent up to the room for her. A family member wondered if Ms. Nash Blevins statin has something to do with her muscle weakness, but this is not a new medication, and the dose has not been changed. I will check a CPK level, and stop the statin, however, if it is elevated. She did have 2 cortisone shots into her right knee, which I explained can certainly transiently increase her blood sugar, but that hemoglobin A1c of 9.4 reflects average blood sugars over the past 3 to 4 months, not just a few days. Past Medical History:   Diagnosis Date    A-fib [facility])     Arthritis     Breast cancer ([facility] 2002    left breast    CAD (coronary artery disease)     Cancer ([facility]     breast and colon    Colon cancer ([facility] 2007    Coronary stent patent 2016    GERD (gastroesophageal reflux disease)     High cholesterol     Hypertension     Pacemaker 12/2018    Peripheral neuropathy     hands    Urinary incontinence         Past Surgical History:   Procedure Laterality Date    HX APPENDECTOMY      HX BREAST BIOPSY Left 2006    lumpectomy    HX CARPAL TUNNEL RELEASE Left     HX CORONARY [address], 09/09/2016    HX HYSTERECTOMY      HX OTHER SURGICAL      bladder surgeries x3    HX PACEMAKER PLACEMENT  12/07/2018    AR ABDOMEN SURGERY PROC UNLISTED  2006    colon resection with anastomosis        Current Outpatient Medications   Medication Sig Dispense Refill    glipiZIDE (GLUCOTROL) 5 mg tablet Take 1 Tablet by mouth two (2) times a day. 90 Tablet 2    Blood-Glucose Meter monitoring kit by Does Not Apply route daily.  Brand covered by insurance 1 Kit 0    glucose blood VI test strips strip by Does Not Apply route daily. Brand covered by insurance 100 Strip 5    lancets misc 1 Package by Does Not Apply route daily. Brand covered by insurance 1 Each 11    amLODIPine (NORVASC) 10 mg tablet Take 10 mg by mouth daily. acetaminophen (Tylenol Extra Strength) 500 mg tablet Take 2 Tablets by mouth two (2) times daily as needed for Pain. 120 Tablet 0    methenamine hippurate (HIPREX) 1 gram tablet Take 1 Tablet by mouth two (2) times daily (with meals). 180 Tablet 3    losartan (COZAAR) 25 mg tablet TAKE 1 TABLET BY MOUTH DAILY. 30 Tablet 0    loperamide (IMODIUM) 2 mg capsule TAKE 1 CAPSULE BY MOUTH DAILY. 30 Capsule 0    OTHER D-mannose PO      Premarin 0.625 mg/gram vaginal cream APPLY A SMALL AMOUNT TO AFFECTED AREA EVERY NIGHT AS DIRECTED 30 g 3    OTHER [facility]      DERMOTIC OIL 0.01 % drop APPLY 1-2 DROPS PER EAR AS NEEDED      albuterol (PROVENTIL HFA, VENTOLIN HFA, PROAIR HFA) 90 mcg/actuation inhaler Take 2 Puffs by inhalation every six (6) hours as needed for Wheezing. 1 Inhaler 3    B infantis/B ani/B lorene/B bifid (PROBIOTIC 4X PO) Take  by mouth.      cranberry extract 650 mg cap Take 1 Cap by mouth daily. ketoconazole (NIZORAL) 2 % shampoo Use as directed daily prn 1 Bottle 5    apixaban (ELIQUIS) 2.5 mg tablet Take 2.5 mg by mouth two (2) times a day. sotalol (BETAPACE) 80 mg tablet Take 40 mg by mouth daily. rosuvastatin (CRESTOR) 10 mg tablet Take 10 mg by mouth nightly. nitroglycerin (NITROSTAT) 0.4 mg SL tablet One tab sublingual every 5 min for relief of pain x 3 as necessary. Report to ER if pain lasts longer than 30 min      lidocaine (LIDODERM) 5 % 1 Patch by TransDERmal route every twenty-four (24) hours. Apply patch to the affected area for 12 hours a day and remove for 12 hours a day.  (Patient not taking: Reported on 9/19/2022) 14 Each 0    OTHER UQORA (Patient not taking: Reported on 9/19/2022) Allergies   Allergen Reactions    Ciprofloxacin Nausea and Vomiting    Doxycycline Nausea and Vomiting    Novocain [Procaine] Swelling    Sulfa (Sulfonamide Antibiotics) Rash        Social History     Socioeconomic History    Marital status:      Spouse name: Not on file    Number of children: Not on file    Years of education: Not on file    Highest education level: Not on file   Occupational History    Occupation: retired   Tobacco Use    Smoking status: Former     Types: Cigarettes     Quit date: 1983     Years since quittin.1    Smokeless tobacco: Never   Vaping Use    Vaping Use: Never used   Substance and Sexual Activity    Alcohol use: Yes     Comment: rare    Drug use: No    Sexual activity: Not Currently   Other Topics Concern    Not on file   Social History Narrative    Not on file     Social Determinants of Health     Financial Resource Strain: Not on file   Food Insecurity: Not on file   Transportation Needs: Not on file   Physical Activity: Not on file   Stress: Not on file   Social Connections: Not on file   Intimate Partner Violence: Not on file   Housing Stability: Not on file        Family History   Problem Relation Age of Onset    Stroke Mother     Heart Disease Father         chf    Cancer Sister            Visit Vitals  /60 (BP 1 Location: Left arm, BP Patient Position: Sitting, BP Cuff Size: Adult)   Pulse 70   Temp 97 °F (36.1 °C) (Temporal)   Resp 16   Ht 4' 8\" (1.422 m)   Wt 109 lb (49.4 kg)   SpO2 95%   BMI 24.44 kg/m²        Review of Systems   Constitutional:  Negative for chills and fever. Cardiovascular:  Negative for chest pain. Gastrointestinal:  Negative for blood in stool. Genitourinary:  Negative for hematuria. Physical Exam  Constitutional:       General: She is not in acute distress. Comments: Seated in wheelchair, weight down about 10 pounds since last visit. Eyes:      General: No scleral icterus.      Conjunctiva/sclera: Conjunctivae normal.   Neck:      Comments: No cervical or supraclavicular lymphadenopathy. Cardiovascular:      Rate and Rhythm: Normal rate and regular rhythm. Pulses: Normal pulses. Heart sounds: No friction rub. No gallop. Pulmonary:      Effort: Pulmonary effort is normal.      Breath sounds: Normal breath sounds. Abdominal:      Palpations: Abdomen is soft. Tenderness: There is no abdominal tenderness. Musculoskeletal:      Cervical back: Neck supple. Comments: No clubbing, cyanosis, or edema. Skin:     General: Skin is warm and dry. Neurological:      Mental Status: She is alert and oriented to person, place, and time. Comments: Hard of hearing. Psychiatric:         Mood and Affect: Mood normal.                Diagnoses and all orders for this visit:    1. Diabetes mellitus with no complication (HCC)  Comments:  Symptomatic w/ polyuria, fatigue. Start glipizide 5 mg every morning, send prescription for meter and supplies,check glucose daily, call if problems. Eat TID  Orders:  -     AMB POC HEMOGLOBIN C1S  -     METABOLIC PANEL, BASIC; Future  -     glipiZIDE (GLUCOTROL) 5 mg tablet; Take 1 Tablet by mouth two (2) times a day. 2. Other fatigue  Comments:  Possibly due to to volume depletion, check BMP, also check CBC to rule out anemia. Orders:  -     CBC WITH AUTOMATED DIFF; Future    3. Therapeutic drug monitoring  Comments: On statin for years without recent dose change, but check CPK, consider stop statin if elevated. Suspect weakness is from uncontrolled diabetes  Orders:  -     CK; Future    4. Weight loss  Comments:  Suspect volume depletion from uncontrolled type 2 diabetes, further evaluation if does not improve with medication. Other orders  -     Blood-Glucose Meter monitoring kit; by Does Not Apply route daily. Brand covered by insurance  -     glucose blood VI test strips strip; by Does Not Apply route daily.  Brand covered by insurance  -     lancets misc; 1 Package by Does Not Apply route daily.  Brand covered by insurance                  Tamia Sheridan MD

## 2022-09-19 NOTE — PROGRESS NOTES
Patient is in the office today for right leg pain, which she was seen by ortho. Daughter states ortho gave patient an injection and daughter states since the injection she has been urinating a lot. Daughter states she has been missing meals. Daughter states patient has fallen 4 times in the last week. Do you have an Advance Directive no  Do you want more information : information given     1. \"Have you been to the ER, urgent care clinic since your last visit? Hospitalized since your last visit? \"  Yes, [facility]    2. \"Have you seen or consulted any other health care providers outside of the [address] [place] [address] since your last visit? \" No     3. For patients aged 39-70: Has the patient had a colonoscopy / FIT/ Cologuard? NA - based on age      If the patient is female:    4. For patients aged 41-77: Has the patient had a mammogram within the past 2 years? NA - based on age or sex      11. For patients aged 21-65: Has the patient had a pap smear? NA - based on age or sex    Per Dr. Karen Lovelace verbal order read back Glucose meter, test strips and lancets brand covered by insurance. Check BS daily. Prescriptions sent to [address] [place] [address].

## 2022-09-20 ENCOUNTER — TELEPHONE (OUTPATIENT)
Dept: INTERNAL MEDICINE CLINIC | Age: 87
End: 2022-09-20

## 2022-09-20 NOTE — TELEPHONE ENCOUNTER
Let Ms. Coppola's daughter know that yesterday labs show that her mother is a little bit dehydrated, other labs normal, have her drink extra fluids, and put in an order for nonfasting BMP to be done in 2 to 3 weeks, before her next appointment with me diagnosis: Uncontrolled type 2 diabetes.

## 2022-09-20 NOTE — TELEPHONE ENCOUNTER
Daughter given message below. Wants to know:    Enma Chambers to just do the labs at [facility] since it is hard to get her out of the house? What time of day should she take the glipizide? Can patient get a continuous glucose monitor since it is so hard for her to do the finger stick?

## 2022-09-20 NOTE — TELEPHONE ENCOUNTER
Yes, okay to do labs at her next visit, no need to fast.    Take the glipizide in the morning    Continuousglucose monitoring is not covered unless patient is required to check sugar 3-4 times daily, and usually has to be on multiple insulin injections. Once her diabetes is in better control, she will have to check her blood sugar so frequently.

## 2022-09-29 ENCOUNTER — TELEPHONE (OUTPATIENT)
Dept: INTERNAL MEDICINE CLINIC | Age: 87
End: 2022-09-29

## 2022-09-29 NOTE — TELEPHONE ENCOUNTER
Asked daughter to try 1and 1/2 tablets daily. If not covered above, take 1 daily. Take in the mornings.   Remember to eat 3 meals a day with this

## 2022-09-29 NOTE — TELEPHONE ENCOUNTER
Pt's daughter stating her Mother has been taking the Glipizide and her sugar had come down to around 150. Now it is creeping back up and this morning it was 210 fasting. She will be going out of town tomorrow and will be in [place] for a week. Please advise. She is taking her to see Dr. Marianela Kay now to see if she might still have a UTI. She will update us.

## 2022-09-29 NOTE — TELEPHONE ENCOUNTER
Patient's daughter states that she is taking 2 tablets a day per Rx and her sugar levels are rising. PAST SURGICAL HISTORY:  H/O cystoscopy TURB, BCG instillation x2 --2013, Bladder cancer    H/O knee surgery arthroscopy does not recall which knee 15 yrs ago    H/O shoulder surgery right shoulder tendon repair-2014    History of nephrolithotomy with removal of calculi Staghorn calculus left kidney--2010    Previous back surgery thoracic/lumbar x2---last 2011 fusion with instrumentation

## 2022-10-11 RX ORDER — LOPERAMIDE HYDROCHLORIDE 2 MG/1
2 CAPSULE ORAL
Qty: 30 CAPSULE | Refills: 0 | Status: SHIPPED | OUTPATIENT
Start: 2022-10-11 | End: 2022-10-21

## 2022-10-11 NOTE — TELEPHONE ENCOUNTER
This was last given by another provider (Dr. Taya Dior). Please sign if appropriate. Last Visit: 9/19/22 with MD Judith Ashley  Next Appointment: 10/13/22 with MD Judith Ashley    Requested Prescriptions     Pending Prescriptions Disp Refills    loperamide (IMODIUM) 2 mg capsule 30 Capsule 0     Sig: Take 1 Capsule by mouth daily as needed for Diarrhea for up to 10 days. For [address] [facility] in place:   Recommendation Provided To:    Intervention Detail: New Rx: 1, reason: Patient Preference  Gap Closed?:   Intervention Accepted By:   Time Spent (min): 5

## 2022-10-13 ENCOUNTER — OFFICE VISIT (OUTPATIENT)
Dept: INTERNAL MEDICINE CLINIC | Age: 87
End: 2022-10-13
Payer: MEDICARE

## 2022-10-13 VITALS
RESPIRATION RATE: 16 BRPM | WEIGHT: 113 LBS | HEIGHT: 56 IN | HEART RATE: 76 BPM | SYSTOLIC BLOOD PRESSURE: 120 MMHG | DIASTOLIC BLOOD PRESSURE: 70 MMHG | TEMPERATURE: 97 F | OXYGEN SATURATION: 96 % | BODY MASS INDEX: 25.42 KG/M2

## 2022-10-13 DIAGNOSIS — E11.9 DIABETES MELLITUS WITH NO COMPLICATION (HCC): Primary | ICD-10-CM

## 2022-10-13 DIAGNOSIS — I10 ESSENTIAL HYPERTENSION: ICD-10-CM

## 2022-10-13 DIAGNOSIS — N39.0 FREQUENT UTI: ICD-10-CM

## 2022-10-13 PROBLEM — E11.22 TYPE 2 DIABETES MELLITUS WITH CHRONIC KIDNEY DISEASE (HCC): Status: ACTIVE | Noted: 2022-10-13

## 2022-10-13 PROCEDURE — G8427 DOCREV CUR MEDS BY ELIG CLIN: HCPCS | Performed by: INTERNAL MEDICINE

## 2022-10-13 PROCEDURE — 99214 OFFICE O/P EST MOD 30 MIN: CPT | Performed by: INTERNAL MEDICINE

## 2022-10-13 PROCEDURE — G0463 HOSPITAL OUTPT CLINIC VISIT: HCPCS | Performed by: INTERNAL MEDICINE

## 2022-10-13 PROCEDURE — G8536 NO DOC ELDER MAL SCRN: HCPCS | Performed by: INTERNAL MEDICINE

## 2022-10-13 PROCEDURE — 1101F PT FALLS ASSESS-DOCD LE1/YR: CPT | Performed by: INTERNAL MEDICINE

## 2022-10-13 PROCEDURE — G8417 CALC BMI ABV UP PARAM F/U: HCPCS | Performed by: INTERNAL MEDICINE

## 2022-10-13 PROCEDURE — 3046F HEMOGLOBIN A1C LEVEL >9.0%: CPT | Performed by: INTERNAL MEDICINE

## 2022-10-13 PROCEDURE — G8432 DEP SCR NOT DOC, RNG: HCPCS | Performed by: INTERNAL MEDICINE

## 2022-10-13 PROCEDURE — 1123F ACP DISCUSS/DSCN MKR DOCD: CPT | Performed by: INTERNAL MEDICINE

## 2022-10-13 PROCEDURE — 1090F PRES/ABSN URINE INCON ASSESS: CPT | Performed by: INTERNAL MEDICINE

## 2022-10-13 RX ORDER — GLIPIZIDE 5 MG/1
TABLET ORAL
Qty: 270 TABLET | Refills: 2 | Status: SHIPPED | OUTPATIENT
Start: 2022-10-13

## 2022-10-13 RX ORDER — NITROFURANTOIN (MACROCRYSTALS) 100 MG/1
CAPSULE ORAL
Qty: 14 CAPSULE | Refills: 0 | Status: SHIPPED | OUTPATIENT
Start: 2022-10-13

## 2022-10-13 NOTE — PROGRESS NOTES
FANTASMA Larson is here with her daughter to follow-up on her type 2 diabetes. Her blood sugars were running high, and she has been taking glipizide 5 mg twice daily. We increased her to 10 mg in the morning, 5 mg in the evening, and all her blood sugars are running between about 120-140. We discussed the importance of eating 3 meals a day to avoid hypoglycemia. She was recently treated for a UTI by urology with amoxicillin. She has persistent pelvic pressure, which usually resolves after course of antibiotics. She has to be cathed for a urine specimen. Her daughter did a dip of a urine specimen at home, and it was positive for nitrites and leukocytes. Her daughter reports that her brother, Mrs. Kalyan Pacheco son, does not believe she should be on a statin drug. He believes the statin is contributing to his mother's weakness; her daughter believes it is due to the inactivity. I reviewed that Mrs. Kalyan Pacheco has established peripheral arterial disease, a criteria for statin drug, but if they wish to stop the medication for 2 weeks, blood is the only way to tell if the medicine is contributing. If her symptoms dramatically improved, I asked her daughter to start her on coenzyme every 10, let me know, so I can call in a smaller dose or a gentler statin. Her cardiovascular risk is automatically tripled with the presence of diabetes.         Past Medical History:   Diagnosis Date    A-fib [facility])     Arthritis     Breast cancer ([facility] [address] 2002    left breast    CAD (coronary artery disease)     Cancer ([facility] [address]     breast and colon    Colon cancer ([facility] [address] 2007    Coronary stent patent 2016    GERD (gastroesophageal reflux disease)     High cholesterol     Hypertension     Pacemaker 12/2018    Peripheral neuropathy     hands    Urinary incontinence         Past Surgical History:   Procedure Laterality Date    HX APPENDECTOMY      HX BREAST BIOPSY Left 2006    lumpectomy    HX CARPAL TUNNEL RELEASE Left [address], 09/09/2016    HX HYSTERECTOMY      HX OTHER SURGICAL      bladder surgeries x3    HX PACEMAKER PLACEMENT  12/07/2018    [place] ABDOMEN SURGERY PROC UNLISTED  2006    colon resection with anastomosis        Current Outpatient Medications   Medication Sig Dispense Refill    glipiZIDE (GLUCOTROL) 5 mg tablet 2 po q am ,1 po q pm 270 Tablet 2    nitrofurantoin (MACRODANTIN) 100 mg capsule 1 p.o. twice daily for 5 days 14 Capsule 0    loperamide (IMODIUM) 2 mg capsule Take 1 Capsule by mouth daily as needed for Diarrhea for up to 10 days. 30 Capsule 0    Blood-Glucose Meter monitoring kit by Does Not Apply route daily. Brand covered by insurance 1 Kit 0    glucose blood VI test strips strip by Does Not Apply route daily. Brand covered by insurance 100 Strip 5    lancets misc 1 Package by Does Not Apply route daily. Brand covered by insurance 1 Each 11    amLODIPine (NORVASC) 10 mg tablet Take 10 mg by mouth daily.  acetaminophen (Tylenol Extra Strength) 500 mg tablet Take 2 Tablets by mouth two (2) times daily as needed for Pain. 120 Tablet 0    methenamine hippurate (HIPREX) 1 gram tablet Take 1 Tablet by mouth two (2) times daily (with meals). 180 Tablet 3    losartan (COZAAR) 25 mg tablet TAKE 1 TABLET BY MOUTH DAILY. 30 Tablet 0    OTHER D-mannose PO      Premarin 0.625 mg/gram vaginal cream APPLY A SMALL AMOUNT TO AFFECTED AREA EVERY NIGHT AS DIRECTED 30 g 3    OTHER Focus Select Eye      DERMOTIC OIL 0.01 % drop APPLY 1-2 DROPS PER EAR AS NEEDED      albuterol (PROVENTIL HFA, VENTOLIN HFA, PROAIR HFA) 90 mcg/actuation inhaler Take 2 Puffs by inhalation every six (6) hours as needed for Wheezing. 1 Inhaler 3    B infantis/B ani/B lorene/B bifid (PROBIOTIC 4X PO) Take  by mouth.  cranberry extract 650 mg cap Take 1 Cap by mouth daily.       ketoconazole (NIZORAL) 2 % shampoo Use as directed daily prn 1 Bottle 5    apixaban (ELIQUIS) 2.5 mg tablet Take 2.5 mg by mouth two (2) times a day.  sotalol (BETAPACE) 80 mg tablet Take 40 mg by mouth daily.  rosuvastatin (CRESTOR) 10 mg tablet Take 10 mg by mouth nightly.  nitroglycerin (NITROSTAT) 0.4 mg SL tablet One tab sublingual every 5 min for relief of pain x 3 as necessary. Report to ER if pain lasts longer than 30 min          Allergies   Allergen Reactions    Ciprofloxacin Nausea and Vomiting    Doxycycline Nausea and Vomiting    Novocain [Procaine] Swelling    Sulfa (Sulfonamide Antibiotics) Rash        Social History     Socioeconomic History    Marital status:      Spouse name: Not on file    Number of children: Not on file    Years of education: Not on file    Highest education level: Not on file   Occupational History    Occupation: retired   Tobacco Use    Smoking status: Former     Types: Cigarettes     Quit date: 1983     Years since quittin.2    Smokeless tobacco: Never   Vaping Use    Vaping Use: Never used   Substance and Sexual Activity    Alcohol use: Yes     Comment: rare    Drug use: No    Sexual activity: Not Currently   Other Topics Concern    Not on file   Social History Narrative    Not on file     Social Determinants of Health     Financial Resource Strain: Not on file   Food Insecurity: Not on file   Transportation Needs: Not on file   Physical Activity: Not on file   Stress: Not on file   Social Connections: Not on file   Intimate Partner Violence: Not on file   Housing Stability: Not on file        Family History   Problem Relation Age of Onset    Stroke Mother     Heart Disease Father         chf    Cancer Sister            Visit Vitals  /70   Pulse 76   Temp 97 °F (36.1 °C) (Temporal)   Resp 16   Ht 4' 8\" (1.422 m)   Wt 113 lb (51.3 kg)   SpO2 96%   BMI 25.33 kg/m²        Review of Systems   Constitutional:  Negative for chills and fever. Respiratory:  Negative for shortness of breath. Cardiovascular:  Negative for chest pain. Gastrointestinal:  Negative for blood in stool. Genitourinary:  Positive for frequency and urgency. No gross hematuria     Physical Exam  Constitutional:       General: She is not in acute distress. Eyes:      General: No scleral icterus. Conjunctiva/sclera: Conjunctivae normal.   Cardiovascular:      Rate and Rhythm: Normal rate and regular rhythm. Heart sounds: No friction rub. No gallop. Pulmonary:      Effort: Pulmonary effort is normal.      Breath sounds: Normal breath sounds. Abdominal:      Tenderness: There is no abdominal tenderness. There is no right CVA tenderness or left CVA tenderness. Musculoskeletal:      Cervical back: Neck supple. Comments: No palpable pedal pulses. No cyanosis, clubbing, or edema. Calves nontender, no cords. Skin:     General: Skin is warm and dry. Neurological:      Mental Status: She is alert and oriented to person, place, and time. Psychiatric:         Mood and Affect: Mood normal.                Diagnoses and all orders for this visit:    1. Diabetes mellitus with no complication (HCC)  Comments:  Glipizide dose has been gradually increased, all blood sugars in range. A1c next visit. Reminded to eat regular meals to avoid hypoglycemia  Orders:  -     glipiZIDE (GLUCOTROL) 5 mg tablet; 2 po q am ,1 po q pm    2. Frequent UTI  Comments: With home dip showing nitrites, complaints of pelvic pressure that improved with antibiotics, will send antibiotic to pharmacy  Orders:  -     nitrofurantoin (MACRODANTIN) 100 mg capsule; 1 p.o. twice daily for 5 days    3. Essential hypertension       4> peripheral vascular disease           Is an indication for statin, but okay to stop her medication for 2 weeks and notify me if her leg weakness is significantly improved. Follow-up and Dispositions    Return in about 2 months (around 12/13/2022) for dm.               Van Coles MD

## 2022-10-13 NOTE — PROGRESS NOTES
Sindy Duggan presents with No chief complaint on file. 1. \"Have you been to the ER, urgent care clinic since your last visit? Hospitalized since your last visit? \" No    2. \"Have you seen or consulted any other health care providers outside of the [address] [place] [address] since your last visit? \" No     3. For patients aged 39-70: Has the patient had a colonoscopy / FIT/ Cologuard? NA - based on age      If the patient is female:    4. For patients aged 41-77: Has the patient had a mammogram within the past 2 years? NA - based on age or sex      11. For patients aged 21-65: Has the patient had a pap smear?  NA - based on age or sex

## 2022-10-24 ENCOUNTER — TRANSCRIBE ORDER (OUTPATIENT)
Dept: SCHEDULING | Age: 87
End: 2022-10-24

## 2022-10-24 DIAGNOSIS — Z12.31 SCREENING MAMMOGRAM FOR HIGH-RISK PATIENT: Primary | ICD-10-CM

## 2022-11-07 ENCOUNTER — TELEPHONE (OUTPATIENT)
Dept: INTERNAL MEDICINE CLINIC | Age: 87
End: 2022-11-07

## 2022-11-07 DIAGNOSIS — R19.7 DIARRHEA, UNSPECIFIED TYPE: Primary | ICD-10-CM

## 2022-11-07 RX ORDER — LOPERAMIDE HCL 2 MG
2 TABLET ORAL
Qty: 40 TABLET | Refills: 1 | Status: SHIPPED | OUTPATIENT
Start: 2022-11-07 | End: 2022-11-17

## 2022-11-07 NOTE — TELEPHONE ENCOUNTER
Received a request from [facility] for LOPERAMIDE 2mg Cap, states last filled 10/11/22 but I don't see it on current meds

## 2022-11-14 ENCOUNTER — HOSPITAL ENCOUNTER (OUTPATIENT)
Dept: MAMMOGRAPHY | Age: 87
Discharge: HOME | End: 2022-11-14
Attending: INTERNAL MEDICINE
Payer: MEDICARE

## 2022-11-14 ENCOUNTER — TELEPHONE (OUTPATIENT)
Dept: INTERNAL MEDICINE CLINIC | Age: 87
End: 2022-11-14

## 2022-11-14 DIAGNOSIS — Z12.31 SCREENING MAMMOGRAM FOR HIGH-RISK PATIENT: ICD-10-CM

## 2022-11-14 PROCEDURE — 77063 BREAST TOMOSYNTHESIS BI: CPT

## 2022-12-13 ENCOUNTER — OFFICE VISIT (OUTPATIENT)
Dept: INTERNAL MEDICINE CLINIC | Age: 87
End: 2022-12-13
Payer: MEDICARE

## 2022-12-13 VITALS
BODY MASS INDEX: 25.64 KG/M2 | OXYGEN SATURATION: 93 % | TEMPERATURE: 97.3 F | RESPIRATION RATE: 18 BRPM | HEIGHT: 56 IN | DIASTOLIC BLOOD PRESSURE: 44 MMHG | WEIGHT: 114 LBS | SYSTOLIC BLOOD PRESSURE: 129 MMHG | HEART RATE: 70 BPM

## 2022-12-13 DIAGNOSIS — I70.223 ATHEROSCLEROSIS OF NATIVE ARTERIES OF EXTREMITIES WITH REST PAIN, BILATERAL LEGS (HCC): ICD-10-CM

## 2022-12-13 DIAGNOSIS — I10 ESSENTIAL HYPERTENSION: ICD-10-CM

## 2022-12-13 DIAGNOSIS — M79.605 BILATERAL LEG PAIN: ICD-10-CM

## 2022-12-13 DIAGNOSIS — R19.7 DIARRHEA, UNSPECIFIED TYPE: ICD-10-CM

## 2022-12-13 DIAGNOSIS — M79.604 BILATERAL LEG PAIN: ICD-10-CM

## 2022-12-13 DIAGNOSIS — E11.9 TYPE 2 DIABETES MELLITUS WITHOUT COMPLICATION, WITHOUT LONG-TERM CURRENT USE OF INSULIN (HCC): Primary | ICD-10-CM

## 2022-12-13 PROCEDURE — G8427 DOCREV CUR MEDS BY ELIG CLIN: HCPCS | Performed by: INTERNAL MEDICINE

## 2022-12-13 PROCEDURE — G0463 HOSPITAL OUTPT CLINIC VISIT: HCPCS | Performed by: INTERNAL MEDICINE

## 2022-12-13 PROCEDURE — 1123F ACP DISCUSS/DSCN MKR DOCD: CPT | Performed by: INTERNAL MEDICINE

## 2022-12-13 PROCEDURE — 1090F PRES/ABSN URINE INCON ASSESS: CPT | Performed by: INTERNAL MEDICINE

## 2022-12-13 PROCEDURE — 3046F HEMOGLOBIN A1C LEVEL >9.0%: CPT | Performed by: INTERNAL MEDICINE

## 2022-12-13 PROCEDURE — G8417 CALC BMI ABV UP PARAM F/U: HCPCS | Performed by: INTERNAL MEDICINE

## 2022-12-13 PROCEDURE — G8536 NO DOC ELDER MAL SCRN: HCPCS | Performed by: INTERNAL MEDICINE

## 2022-12-13 PROCEDURE — 1101F PT FALLS ASSESS-DOCD LE1/YR: CPT | Performed by: INTERNAL MEDICINE

## 2022-12-13 PROCEDURE — 99214 OFFICE O/P EST MOD 30 MIN: CPT | Performed by: INTERNAL MEDICINE

## 2022-12-13 PROCEDURE — G8510 SCR DEP NEG, NO PLAN REQD: HCPCS | Performed by: INTERNAL MEDICINE

## 2022-12-13 RX ORDER — LOPERAMIDE HCL 2 MG
2 TABLET ORAL DAILY
Qty: 90 TABLET | Refills: 3 | Status: SHIPPED | OUTPATIENT
Start: 2022-12-13 | End: 2023-12-08

## 2022-12-13 NOTE — PROGRESS NOTES
Sindy Duggan presents today for No chief complaint on file. Is someone accompanying this pt? Yes, daughter    Is the patient using any DME equipment during OV? Yes, walker    Depression Screening:  3 most recent PHQ Screens 8/17/2022   Little interest or pleasure in doing things Not at all   Feeling down, depressed, irritable, or hopeless Not at all   Total Score PHQ 2 0       Learning Assessment:  Learning Assessment 9/18/2019   PRIMARY LEARNER Patient   HIGHEST LEVEL OF EDUCATION - PRIMARY LEARNER  -   BARRIERS PRIMARY LEARNER -   CO-LEARNER CAREGIVER -   PRIMARY LANGUAGE ENGLISH   LEARNER PREFERENCE PRIMARY LISTENING   ANSWERED BY patient   RELATIONSHIP SELF       Abuse Screening:  Abuse Screening Questionnaire 8/17/2022   Do you ever feel afraid of your partner? N   Are you in a relationship with someone who physically or mentally threatens you? N   Is it safe for you to go home? Y       Fall Risk  Fall Risk Assessment, last 12 mths 8/17/2022   Able to walk? Yes   Fall in past 12 months? 0   Do you feel unsteady? -   Are you worried about falling 0   Is TUG test greater than 12 seconds?  -   Is the gait abnormal? -   Number of falls in past 12 months -   Fall with injury? -       ADL  ADL Assessment 8/17/2022   Feeding yourself No Help Needed   Getting from bed to chair No Help Needed   Getting dressed No Help Needed   Bathing or showering No Help Needed   Walk across the room (includes cane/walker) No Help Needed   Using the telphone No Help Needed   Taking your medications No Help Needed   Preparing meals No Help Needed   Managing money (expenses/bills) Help Needed   Moderately strenuous housework (laundry) No Help Needed   Shopping for personal items (toiletries/medicines) No Help Needed   Shopping for groceries No Help Needed   Driving Help Needed   Climbing a flight of stairs Help Needed   Getting to places beyond walking distances Help Needed       Health Maintenance reviewed and discussed and ordered per Provider. Health Maintenance Due   Topic Date Due    MICROALBUMIN Q1  Never done    Eye Exam Retinal or Dilated  Never done    DTaP/Tdap/Td series (1 - Tdap) Never done    Flu Vaccine (1) 08/01/2022   . Coordination of Care:  1. Have you been to the ER, urgent care clinic since your last visit? Hospitalized since your last visit? no    2. Have you seen or consulted any other health care providers outside of the [address] [place] [address] since your last visit? Include any pap smears or colon screening. no    3. For patients aged 39-70: Has the patient had a colonoscopy? NA - based on age     If the patient is female:    4. For patients aged 41-77: Has the patient had a mammogram within the past 2 years? NA - based on age    11. For patients aged 21-65: Has the patient had a pap smear?  NA - based on age

## 2022-12-13 NOTE — PROGRESS NOTES
FANTASMA Mikey Cushing is here with her daughter to follow-up on her type 2 diabetes. Her daughter reports that her mother's blood sugars have been ranging 110-130. There was 1 reported fingerstick blood sugar of 71, was 101 when her daughter arrived to her mother's house, without any food or other intervention. Ms. Deanne Baker denies any signs or symptoms of hypoglycemia, and we reviewed those, as well as the need to eat regular meals. She has been doing physical therapy, and we have had her off of her statin for about 2 months, to see if it helped her leg pain/weakness. It does not seem to have done so. Her daughter would like a lipid panel checked, and I would like to wait until she has been off it for a few more months to see the true readings. Past Medical History:   Diagnosis Date    A-fib [facility])     Arthritis     Breast cancer ([facility] [address] 2002    left breast    CAD (coronary artery disease)     Cancer ([facility] [address]     breast and colon    Colon cancer ([facility] [address] 2007    Coronary stent patent 2016    GERD (gastroesophageal reflux disease)     High cholesterol     Hypertension     Pacemaker 12/2018    Peripheral neuropathy     hands    Urinary incontinence         Past Surgical History:   Procedure Laterality Date    HX APPENDECTOMY      HX BREAST BIOPSY Left 2006    lumpectomy    HX CARPAL TUNNEL RELEASE Left     HX CORONARY [address] 09/09/2016    HX HYSTERECTOMY      HX OTHER SURGICAL      bladder surgeries x3    HX PACEMAKER PLACEMENT  12/07/2018    [place] ABDOMEN SURGERY PROC UNLISTED  2006    colon resection with anastomosis        Current Outpatient Medications   Medication Sig Dispense Refill    loperamide (IMMODIUM) 2 mg tablet Take 1 Tablet by mouth daily for 360 days. 90 Tablet 3    cefdinir (OMNICEF) 300 mg capsule Take 1 Capsule by mouth two (2) times a day.  10 Capsule 0    glipiZIDE (GLUCOTROL) 5 mg tablet 2 po q am ,1 po q pm 270 Tablet 2    Blood-Glucose Meter monitoring kit by Does Not Apply route daily. Brand covered by insurance 1 Kit 0    glucose blood VI test strips strip by Does Not Apply route daily. Brand covered by insurance 100 Strip 5    lancets misc 1 Package by Does Not Apply route daily. Brand covered by insurance 1 Each 11    amLODIPine (NORVASC) 10 mg tablet Take 10 mg by mouth daily.  acetaminophen (Tylenol Extra Strength) 500 mg tablet Take 2 Tablets by mouth two (2) times daily as needed for Pain. 120 Tablet 0    methenamine hippurate (HIPREX) 1 gram tablet Take 1 Tablet by mouth two (2) times daily (with meals). 180 Tablet 3    losartan (COZAAR) 25 mg tablet TAKE 1 TABLET BY MOUTH DAILY. 30 Tablet 0    OTHER D-mannose PO      Premarin 0.625 mg/gram vaginal cream APPLY A SMALL AMOUNT TO AFFECTED AREA EVERY NIGHT AS DIRECTED 30 g 3    OTHER Focus Select Eye      DERMOTIC OIL 0.01 % drop APPLY 1-2 DROPS PER EAR AS NEEDED      albuterol (PROVENTIL HFA, VENTOLIN HFA, PROAIR HFA) 90 mcg/actuation inhaler Take 2 Puffs by inhalation every six (6) hours as needed for Wheezing. 1 Inhaler 3    B infantis/B ani/B lorene/B bifid (PROBIOTIC 4X PO) Take  by mouth.  cranberry extract 650 mg cap Take 1 Cap by mouth daily.  ketoconazole (NIZORAL) 2 % shampoo Use as directed daily prn 1 Bottle 5    apixaban (ELIQUIS) 2.5 mg tablet Take 2.5 mg by mouth two (2) times a day.  sotalol (BETAPACE) 80 mg tablet Take 40 mg by mouth daily.  nitroglycerin (NITROSTAT) 0.4 mg SL tablet One tab sublingual every 5 min for relief of pain x 3 as necessary.   Report to ER if pain lasts longer than 30 min          Allergies   Allergen Reactions    Ciprofloxacin Nausea and Vomiting    Doxycycline Nausea and Vomiting    Novocain [Procaine] Swelling    Sulfa (Sulfonamide Antibiotics) Rash        Social History     Socioeconomic History    Marital status:      Spouse name: Not on file    Number of children: Not on file    Years of education: Not on file    Highest education level: Not on file   Occupational History    Occupation: retired   Tobacco Use    Smoking status: Former     Types: Cigarettes     Quit date: 1983     Years since quittin.4    Smokeless tobacco: Never   Vaping Use    Vaping Use: Never used   Substance and Sexual Activity    Alcohol use: Yes     Comment: rare    Drug use: No    Sexual activity: Not Currently   Other Topics Concern    Not on file   Social History Narrative    Not on file     Social Determinants of Health     Financial Resource Strain: Not on file   Food Insecurity: Not on file   Transportation Needs: Not on file   Physical Activity: Not on file   Stress: Not on file   Social Connections: Not on file   Intimate Partner Violence: Not on file   Housing Stability: Not on file        Family History   Problem Relation Age of Onset    Stroke Mother     Heart Disease Father         chf    Cancer Sister            Visit Vitals  BP (!) 129/44   Pulse 70   Temp 97.3 °F (36.3 °C) (Temporal)   Resp 18   Ht 4' 8\" (1.422 m)   Wt 114 lb (51.7 kg)   SpO2 93%   BMI 25.56 kg/m²        Review of Systems   Respiratory:  Negative for shortness of breath. Cardiovascular:  Negative for chest pain. Gastrointestinal:  Negative for blood in stool. Genitourinary:  Negative for hematuria. Neurological:  Negative for tingling. Psychiatric/Behavioral:  Negative for depression. The patient is not nervous/anxious. Physical Exam  Constitutional:       General: She is not in acute distress. Appearance: She is normal weight. Eyes:      General: No scleral icterus. Conjunctiva/sclera: Conjunctivae normal.   Neck:      Comments: Carotid upstrokes normal to palpation. Lymph: No cervical or supraclavicular lymphadenopathy. Cardiovascular:      Rate and Rhythm: Normal rate and regular rhythm. Heart sounds: No friction rub.    Pulmonary:      Effort: Pulmonary effort is normal.      Breath sounds: Normal breath sounds. Abdominal:      Palpations: Abdomen is soft. Tenderness: There is no abdominal tenderness. There is no right CVA tenderness or left CVA tenderness. Musculoskeletal:      Comments: No clubbing, cyanosis, or edema. No palpable dorsalis pedis or posterior tibial pulses bilaterally. Calves nontender, no cords. Skin:     General: Skin is warm and dry. Neurological:      Mental Status: She is alert and oriented to person, place, and time. Psychiatric:         Mood and Affect: Mood normal.                Diagnoses and all orders for this visit:    1. Type 2 diabetes mellitus without complication, without long-term current use of insulin ([facility])  Comments:  A1c decreased from 9.2 6.8, excellent response to sulfonylurea, continue. A1c w/ next labs, always carry snack, reviewed signs/syx symptoms of hypoglycemia  Orders:  -     HEMOGLOBIN A1C WITH EAG; Future    2. Atherosclerosis of native arteries of extremities with rest pain, bilateral legs ([facility] [address]   Comments:  Fasting lipids with next labs. Goal LDL less than 70  Orders:  -     LIPID PANEL; Future    3. Diarrhea, unspecified type  Comments:  Controlled with daily loperamide, refill sent to pharmacy. Orders:  -     loperamide (IMMODIUM) 2 mg tablet; Take 1 Tablet by mouth daily for 360 days. 4. Essential hypertension  Comments:  controlled, continue medications. 5. Bilateral leg pain  Comments:  No significant change with physical therapy or stopping of statin. Check lipids off medication shortly before next visit. Follow-up and Dispositions    Return in about 4 months (around 4/13/2023) for DM, labs week before.               Alejandra Vasquez MD

## 2023-01-10 ENCOUNTER — OFFICE VISIT (OUTPATIENT)
Dept: INTERNAL MEDICINE CLINIC | Age: 88
End: 2023-01-10
Payer: MEDICARE

## 2023-01-10 VITALS
TEMPERATURE: 97.1 F | OXYGEN SATURATION: 97 % | SYSTOLIC BLOOD PRESSURE: 122 MMHG | RESPIRATION RATE: 18 BRPM | HEIGHT: 56 IN | WEIGHT: 114 LBS | DIASTOLIC BLOOD PRESSURE: 55 MMHG | HEART RATE: 69 BPM | BODY MASS INDEX: 25.64 KG/M2

## 2023-01-10 DIAGNOSIS — I73.9 PERIPHERAL VASCULAR DISEASE (HCC): ICD-10-CM

## 2023-01-10 DIAGNOSIS — L03.116 CELLULITIS OF LEFT FOOT: Primary | ICD-10-CM

## 2023-01-10 DIAGNOSIS — I10 ESSENTIAL HYPERTENSION: ICD-10-CM

## 2023-01-10 DIAGNOSIS — M54.32 LEFT SIDED SCIATICA: ICD-10-CM

## 2023-01-10 PROCEDURE — G8536 NO DOC ELDER MAL SCRN: HCPCS | Performed by: INTERNAL MEDICINE

## 2023-01-10 PROCEDURE — G8510 SCR DEP NEG, NO PLAN REQD: HCPCS | Performed by: INTERNAL MEDICINE

## 2023-01-10 PROCEDURE — 1123F ACP DISCUSS/DSCN MKR DOCD: CPT | Performed by: INTERNAL MEDICINE

## 2023-01-10 PROCEDURE — 1101F PT FALLS ASSESS-DOCD LE1/YR: CPT | Performed by: INTERNAL MEDICINE

## 2023-01-10 PROCEDURE — G0463 HOSPITAL OUTPT CLINIC VISIT: HCPCS | Performed by: INTERNAL MEDICINE

## 2023-01-10 PROCEDURE — G8417 CALC BMI ABV UP PARAM F/U: HCPCS | Performed by: INTERNAL MEDICINE

## 2023-01-10 PROCEDURE — 99214 OFFICE O/P EST MOD 30 MIN: CPT | Performed by: INTERNAL MEDICINE

## 2023-01-10 PROCEDURE — G8427 DOCREV CUR MEDS BY ELIG CLIN: HCPCS | Performed by: INTERNAL MEDICINE

## 2023-01-10 PROCEDURE — 1090F PRES/ABSN URINE INCON ASSESS: CPT | Performed by: INTERNAL MEDICINE

## 2023-01-10 RX ORDER — CEPHALEXIN 250 MG/1
250 CAPSULE ORAL 3 TIMES DAILY
Qty: 30 CAPSULE | Refills: 0 | Status: SHIPPED | OUTPATIENT
Start: 2023-01-10 | End: 2023-01-20

## 2023-01-10 NOTE — PROGRESS NOTES
HPI      Cleve Oscar is here with her daughter for several days of changes in the dorsum of her left foot. Began as a small scab, has progressed to erythema in the distal left foot. Denies any systemic symptoms. Patient with known peripheral arterial disease for several years, further evaluation/treatment has been limited due to risks of contrast for arteriogram, and problems with anesthesia in the past.  They have been elevating the foot, and applying bacitracin. Past Medical History:   Diagnosis Date    A-fib [facility])     Arthritis     Breast cancer ([facility] [address] 2002    left breast    CAD (coronary artery disease)     Cancer ([facility] [address]     breast and colon    Colon cancer ([facility] [address] 2007    Coronary stent patent 2016    GERD (gastroesophageal reflux disease)     High cholesterol     Hypertension     Pacemaker 12/2018    Peripheral neuropathy     hands    Urinary incontinence         Past Surgical History:   Procedure Laterality Date    HX APPENDECTOMY      HX BREAST BIOPSY Left 2006    lumpectomy    HX CARPAL TUNNEL RELEASE Left     HX CORONARY [address], 09/09/2016    HX HYSTERECTOMY      HX OTHER SURGICAL      bladder surgeries x3    HX PACEMAKER PLACEMENT  12/07/2018    [facility] UNLISTED PROCEDURE ABDOMEN PERITONEUM & OMENTUM  2006    colon resection with anastomosis        Current Outpatient Medications   Medication Sig Dispense Refill    cephALEXin (KEFLEX) 250 mg capsule Take 1 Capsule by mouth three (3) times daily for 10 days. 30 Capsule 0    loperamide (IMMODIUM) 2 mg tablet Take 1 Tablet by mouth daily for 360 days. 90 Tablet 3    glipiZIDE (GLUCOTROL) 5 mg tablet 2 po q am ,1 po q pm 270 Tablet 2    Blood-Glucose Meter monitoring kit by Does Not Apply route daily. Brand covered by insurance 1 Kit 0    glucose blood VI test strips strip by Does Not Apply route daily. Brand covered by insurance 100 Strip 5    lancets misc 1 Package by Does Not Apply route daily.  Brand covered by insurance 1 Each 11 amLODIPine (NORVASC) 10 mg tablet Take 10 mg by mouth daily. acetaminophen (Tylenol Extra Strength) 500 mg tablet Take 2 Tablets by mouth two (2) times daily as needed for Pain. 120 Tablet 0    methenamine hippurate (HIPREX) 1 gram tablet Take 1 Tablet by mouth two (2) times daily (with meals). 180 Tablet 3    losartan (COZAAR) 25 mg tablet TAKE 1 TABLET BY MOUTH DAILY. 30 Tablet 0    OTHER D-mannose PO      Premarin 0.625 mg/gram vaginal cream APPLY A SMALL AMOUNT TO AFFECTED AREA EVERY NIGHT AS DIRECTED 30 g 3    OTHER [employer]      DERMOTIC OIL 0.01 % drop APPLY 1-2 DROPS PER EAR AS NEEDED      albuterol (PROVENTIL HFA, VENTOLIN HFA, PROAIR HFA) 90 mcg/actuation inhaler Take 2 Puffs by inhalation every six (6) hours as needed for Wheezing. 1 Inhaler 3    B infantis/B ani/B lorene/B bifid (PROBIOTIC 4X PO) Take  by mouth.      cranberry extract 650 mg cap Take 1 Cap by mouth daily. ketoconazole (NIZORAL) 2 % shampoo Use as directed daily prn 1 Bottle 5    apixaban (ELIQUIS) 2.5 mg tablet Take 2.5 mg by mouth two (2) times a day. sotalol (BETAPACE) 80 mg tablet Take 40 mg by mouth daily. nitroglycerin (NITROSTAT) 0.4 mg SL tablet One tab sublingual every 5 min for relief of pain x 3 as necessary.   Report to ER if pain lasts longer than 30 min          Allergies   Allergen Reactions    Ciprofloxacin Nausea and Vomiting    Doxycycline Nausea and Vomiting    Novocain [Procaine] Swelling    Sulfa (Sulfonamide Antibiotics) Rash        Social History     Socioeconomic History    Marital status:      Spouse name: Not on file    Number of children: Not on file    Years of education: Not on file    Highest education level: Not on file   Occupational History    Occupation: retired   Tobacco Use    Smoking status: Former     Types: Cigarettes     Quit date: 1983     Years since quittin.5    Smokeless tobacco: Never   Vaping Use    Vaping Use: Never used   Substance and Sexual Activity Alcohol use: Yes     Comment: rare    Drug use: No    Sexual activity: Not Currently   Other Topics Concern    Not on file   Social History Narrative    Not on file     Social Determinants of Health     Financial Resource Strain: Not on file   Food Insecurity: Not on file   Transportation Needs: Not on file   Physical Activity: Not on file   Stress: Not on file   Social Connections: Not on file   Intimate Partner Violence: Not on file   Housing Stability: Not on file        Family History   Problem Relation Age of Onset    Stroke Mother     Heart Disease Father         chf    Cancer Sister            Visit Vitals  BP (!) 122/55 (BP 1 Location: Right upper arm, BP Patient Position: Sitting, BP Cuff Size: Large adult)   Pulse 69   Temp 97.1 °F (36.2 °C) (Temporal)   Resp 18   Ht 4' 8\" (1.422 m)   Wt 114 lb (51.7 kg)   SpO2 97%   BMI 25.56 kg/m²        Review of Systems   Constitutional:  Negative for chills and fever. Physical Exam  Constitutional:       General: She is not in acute distress. Appearance: She is not toxic-appearing. Eyes:      General: No scleral icterus. Conjunctiva/sclera: Conjunctivae normal.   Cardiovascular:      Rate and Rhythm: Normal rate. Heart sounds: No friction rub. No gallop. Pulmonary:      Effort: Pulmonary effort is normal.      Breath sounds: Normal breath sounds. Abdominal:      Palpations: Abdomen is soft. Tenderness: There is no abdominal tenderness. Musculoskeletal:      Comments: No Clubbing, cyanosis, or edema. Mild warmth and erythema to distal dorsum of left foot. No palpable PT or DP pulses. Calves nontender, no cords. Skin:     General: Skin is warm and dry. Neurological:      Mental Status: She is alert and oriented to person, place, and time. Psychiatric:         Mood and Affect: Mood normal.                Diagnoses and all orders for this visit:    1.  Cellulitis of left foot  Comments:  Small ulceration or ablation at dorsum left foot, warm to the touch, Keflex 3 times daily, warned PAD may limit healing, recommend see vascular  Orders:  -     REFERRAL TO VASCULAR SURGERY  -     cephALEXin (KEFLEX) 250 mg capsule; Take 1 Capsule by mouth three (3) times daily for 10 days. 2. Left sided sciatica  Comments:  Physical therapy helped in the past, printed referral  Orders:  -     REFERRAL TO PHYSICAL THERAPY    3. Peripheral vascular disease ([facility] [address]  Comments:  Further evaluation/surgery limited due to problems with anesthesia in past.    4. Essential hypertension  Comments:  in good range on recheck, continue medications.                   Maria L Farias MD

## 2023-01-10 NOTE — PROGRESS NOTES
Ciaran Kaufman presents today for No chief complaint on file. Is someone accompanying this pt? Yes, daughter    Is the patient using any DME equipment during OV? Yes, walker    Depression Screening:  3 most recent PHQ Screens 12/13/2022   Little interest or pleasure in doing things Not at all   Feeling down, depressed, irritable, or hopeless Not at all   Total Score PHQ 2 0       Learning Assessment:  Learning Assessment 9/18/2019   PRIMARY LEARNER Patient   HIGHEST LEVEL OF EDUCATION - PRIMARY LEARNER  -   BARRIERS PRIMARY LEARNER -   CO-LEARNER CAREGIVER -   PRIMARY LANGUAGE ENGLISH   LEARNER PREFERENCE PRIMARY LISTENING   ANSWERED BY patient   RELATIONSHIP SELF       Abuse Screening:  Abuse Screening Questionnaire 12/13/2022   Do you ever feel afraid of your partner? N   Are you in a relationship with someone who physically or mentally threatens you? N   Is it safe for you to go home? Y       Fall Risk  Fall Risk Assessment, last 12 mths 12/13/2022   Able to walk? Yes   Fall in past 12 months? 0   Do you feel unsteady? 0   Are you worried about falling 0   Is TUG test greater than 12 seconds?  -   Is the gait abnormal? -   Number of falls in past 12 months -   Fall with injury? -       ADL  ADL Assessment 8/17/2022   Feeding yourself No Help Needed   Getting from bed to chair No Help Needed   Getting dressed No Help Needed   Bathing or showering No Help Needed   Walk across the room (includes cane/walker) No Help Needed   Using the telphone No Help Needed   Taking your medications No Help Needed   Preparing meals No Help Needed   Managing money (expenses/bills) Help Needed   Moderately strenuous housework (laundry) No Help Needed   Shopping for personal items (toiletries/medicines) No Help Needed   Shopping for groceries No Help Needed   Driving Help Needed   Climbing a flight of stairs Help Needed   Getting to places beyond walking distances Help Needed       Health Maintenance reviewed and discussed and ordered per Provider. Health Maintenance Due   Topic Date Due    DTaP/Tdap/Td series (1 - Tdap) Never done    Flu Vaccine (1) 08/01/2022   . Coordination of Care:  1. Have you been to the ER, urgent care clinic since your last visit? Hospitalized since your last visit? no    2. Have you seen or consulted any other health care providers outside of the [address] [place] [address] since your last visit? Include any pap smears or colon screening. no    3. For patients aged 39-70: Has the patient had a colonoscopy? NA - based on age     If the patient is female:    4. For patients aged 41-77: Has the patient had a mammogram within the past 2 years? NA - based on age    11. For patients aged 21-65: Has the patient had a pap smear?  NA - based on age

## 2023-01-11 ENCOUNTER — OFFICE VISIT (OUTPATIENT)
Dept: VASCULAR SURGERY | Age: 88
End: 2023-01-11
Payer: MEDICARE

## 2023-01-11 VITALS
DIASTOLIC BLOOD PRESSURE: 80 MMHG | SYSTOLIC BLOOD PRESSURE: 122 MMHG | WEIGHT: 113.98 LBS | HEART RATE: 66 BPM | OXYGEN SATURATION: 97 % | BODY MASS INDEX: 25.64 KG/M2 | HEIGHT: 56 IN

## 2023-01-11 DIAGNOSIS — I70.213 ATHEROSCLEROSIS OF NATIVE ARTERY OF BOTH LOWER EXTREMITIES WITH INTERMITTENT CLAUDICATION (HCC): Primary | ICD-10-CM

## 2023-01-11 DIAGNOSIS — L03.116 CELLULITIS OF LEFT FOOT: ICD-10-CM

## 2023-01-11 DIAGNOSIS — R09.89 LEFT CAROTID BRUIT: ICD-10-CM

## 2023-01-11 PROCEDURE — 1123F ACP DISCUSS/DSCN MKR DOCD: CPT | Performed by: PHYSICIAN ASSISTANT

## 2023-01-11 PROCEDURE — 99204 OFFICE O/P NEW MOD 45 MIN: CPT | Performed by: PHYSICIAN ASSISTANT

## 2023-01-11 NOTE — PROGRESS NOTES
1. Have you been to the ER, urgent care clinic since your last visit? no     Hospitalized since your last visit? No     2. Have you seen or consulted any other health care providers outside of the [address] [place] [address] since your last visit? Include any pap smears or colon screening.   No

## 2023-01-11 NOTE — PROGRESS NOTES
Chief Complaint   Patient presents with    New Patient     Cellulitis of left foot          HPI: Mariluz Muller is a 80 y.o. female with a h/o PAD, DM who presents with left dorsal foot cellulitis and scab formation. She presents with her daughter who states the erythema started a few days ago. She was seen by her PCP and started on Keflex yesterday. Daughter states some improvement in erythema. Patient was seen 9/18/2019 for claudication with abnormal COLLIN, 0.41 on the left and 0.55 on the right. Intervention with angiogram versus continued conservative management with routine surveillance was dicussed at that time and the decision was made to proceed conservatively with routine surveillance. She does not exert herself enough to exhibit claudication symptoms. She was able to walk from the car to the office today without any symptoms. Denies any fever, chills or wound drainage.           IMAGING:    none      Past Medical History:   Diagnosis Date    A-fib [facility])     Arthritis     Breast cancer ([facility] [address] 2002    left breast    CAD (coronary artery disease)     Cancer ([facility] [address]     breast and colon    Colon cancer ([facility] [address] 2007    Coronary stent patent 2016    GERD (gastroesophageal reflux disease)     High cholesterol     Hypertension     Pacemaker 12/2018    Peripheral neuropathy     hands    Urinary incontinence        Past Surgical History:   Procedure Laterality Date    HX APPENDECTOMY      HX BREAST BIOPSY Left 2006    lumpectomy    HX CARPAL TUNNEL RELEASE Left     HX CORONARY [address], 09/09/2016    HX HYSTERECTOMY      HX OTHER SURGICAL      bladder surgeries x3    HX PACEMAKER PLACEMENT  12/07/2018    [place] UNLISTED PROCEDURE ABDOMEN PERITONEUM & OMENTUM  2006    colon resection with anastomosis       Allergies   Allergen Reactions    Ciprofloxacin Nausea and Vomiting    Doxycycline Nausea and Vomiting    Novocain [Procaine] Swelling    Sulfa (Sulfonamide Antibiotics) Rash       Current Outpatient Medications on File Prior to Visit   Medication Sig Dispense Refill    cephALEXin (KEFLEX) 250 mg capsule Take 1 Capsule by mouth three (3) times daily for 10 days. 30 Capsule 0    loperamide (IMMODIUM) 2 mg tablet Take 1 Tablet by mouth daily for 360 days. 90 Tablet 3    glipiZIDE (GLUCOTROL) 5 mg tablet 2 po q am ,1 po q pm 270 Tablet 2    Blood-Glucose Meter monitoring kit by Does Not Apply route daily. Brand covered by insurance 1 Kit 0    glucose blood VI test strips strip by Does Not Apply route daily. Brand covered by insurance 100 Strip 5    lancets misc 1 Package by Does Not Apply route daily. Brand covered by insurance 1 Each 11    amLODIPine (NORVASC) 10 mg tablet Take 10 mg by mouth daily. acetaminophen (Tylenol Extra Strength) 500 mg tablet Take 2 Tablets by mouth two (2) times daily as needed for Pain. 120 Tablet 0    methenamine hippurate (HIPREX) 1 gram tablet Take 1 Tablet by mouth two (2) times daily (with meals). 180 Tablet 3    losartan (COZAAR) 25 mg tablet TAKE 1 TABLET BY MOUTH DAILY. 30 Tablet 0    OTHER D-mannose PO      Premarin 0.625 mg/gram vaginal cream APPLY A SMALL AMOUNT TO AFFECTED AREA EVERY NIGHT AS DIRECTED 30 g 3    OTHER [employer]      DERMOTIC OIL 0.01 % drop APPLY 1-2 DROPS PER EAR AS NEEDED      albuterol (PROVENTIL HFA, VENTOLIN HFA, PROAIR HFA) 90 mcg/actuation inhaler Take 2 Puffs by inhalation every six (6) hours as needed for Wheezing. 1 Inhaler 3    B infantis/B ani/B lorene/B bifid (PROBIOTIC 4X PO) Take  by mouth.      cranberry extract 650 mg cap Take 1 Cap by mouth daily. ketoconazole (NIZORAL) 2 % shampoo Use as directed daily prn 1 Bottle 5    apixaban (ELIQUIS) 2.5 mg tablet Take 2.5 mg by mouth two (2) times a day. sotalol (BETAPACE) 80 mg tablet Take 40 mg by mouth daily. nitroglycerin (NITROSTAT) 0.4 mg SL tablet One tab sublingual every 5 min for relief of pain x 3 as necessary.   Report to ER if pain lasts longer than 30 min       No current facility-administered medications on file prior to visit. Social History     Tobacco Use    Smoking status: Former     Types: Cigarettes     Quit date: 1983     Years since quittin.5    Smokeless tobacco: Never   Substance Use Topics    Alcohol use: Yes     Comment: rare       Family History   Problem Relation Age of Onset    Stroke Mother     Heart Disease Father         chf    Cancer Sister        Review of Systems   Constitutional:  Negative for chills and fever. HENT:  Negative for hearing loss. Eyes:  Negative for blurred vision and double vision. Respiratory:  Negative for shortness of breath. Cardiovascular:  Negative for chest pain, claudication and leg swelling. Gastrointestinal:  Negative for nausea and vomiting. Skin:         Left dorsal foot erythema with scab present. Neurological:  Negative for dizziness. Psychiatric/Behavioral: Negative. Visit Vitals  /80 (BP 1 Location: Left arm, BP Patient Position: Sitting)   Pulse 66   Ht 4' 8\" (1.422 m)   Wt 113 lb 15.7 oz (51.7 kg)   SpO2 97%   BMI 25.55 kg/m²       PHYSICAL EXAMINATION:  GEN: alert, oriented, affect appropriate  HEENT:   PERRLA, EOMI  NECK: supple with no masses noted and left carotid bruit  CARDIAC: normal and regular rate and rhythm   PULMONARY: clear to auscultation and effort normal  ABDOMINAL: soft, nontender, nondistended and no pulsatile abdominal mass appreciated  EXTREMITIES:   left dorsal foot erythema with scab  Strong signals in BLE     ASSESSMENT/PLAN:      1. PAD with dorsal left foot cellulitis/wound  2. Left carotid bruit  3. Follow up after COLLIN and carotid duplex      Thank you for allowing me to participate in the care of this patient.      Dahiana Link PA-C  Vascular Physician Assistant

## 2023-02-04 DIAGNOSIS — E11.9 TYPE 2 DIABETES MELLITUS WITHOUT COMPLICATION, WITHOUT LONG-TERM CURRENT USE OF INSULIN (HCC): Primary | ICD-10-CM

## 2023-02-05 DIAGNOSIS — I70.223 ATHEROSCLEROSIS OF NATIVE ARTERIES OF EXTREMITIES WITH REST PAIN, BILATERAL LEGS (HCC): Primary | ICD-10-CM

## 2023-02-06 ENCOUNTER — OFFICE VISIT (OUTPATIENT)
Dept: VASCULAR SURGERY | Age: 88
End: 2023-02-06
Payer: MEDICARE

## 2023-02-06 VITALS
BODY MASS INDEX: 25.19 KG/M2 | OXYGEN SATURATION: 93 % | HEART RATE: 70 BPM | WEIGHT: 112 LBS | SYSTOLIC BLOOD PRESSURE: 120 MMHG | DIASTOLIC BLOOD PRESSURE: 60 MMHG | HEIGHT: 56 IN

## 2023-02-06 DIAGNOSIS — I73.9 PERIPHERAL VASCULAR DISEASE (HCC): Primary | ICD-10-CM

## 2023-02-06 PROCEDURE — 1090F PRES/ABSN URINE INCON ASSESS: CPT | Performed by: SURGERY

## 2023-02-06 PROCEDURE — G8536 NO DOC ELDER MAL SCRN: HCPCS | Performed by: SURGERY

## 2023-02-06 PROCEDURE — 99203 OFFICE O/P NEW LOW 30 MIN: CPT | Performed by: SURGERY

## 2023-02-06 PROCEDURE — 1123F ACP DISCUSS/DSCN MKR DOCD: CPT | Performed by: SURGERY

## 2023-02-06 PROCEDURE — G8427 DOCREV CUR MEDS BY ELIG CLIN: HCPCS | Performed by: SURGERY

## 2023-02-06 PROCEDURE — G8417 CALC BMI ABV UP PARAM F/U: HCPCS | Performed by: SURGERY

## 2023-02-06 PROCEDURE — G8432 DEP SCR NOT DOC, RNG: HCPCS | Performed by: SURGERY

## 2023-02-06 PROCEDURE — 1101F PT FALLS ASSESS-DOCD LE1/YR: CPT | Performed by: SURGERY

## 2023-02-06 NOTE — PROGRESS NOTES
1. Have you been to the ER, urgent care clinic since your last visit? No Hospitalized since your last visit? No    2. Have you seen or consulted any other health care providers outside of the [address] [place] [address] since your last visit? Include any pap smears or colon screening.  No

## 2023-02-06 NOTE — PROGRESS NOTES
02/06/23        [facility]        History and Physical    Honor  is a 80 y.o. female The encounter diagnosis was Peripheral vascular disease ([address] Ms. Xiao Castro is a pleasant 81y/o female who is following up for a history of PAD. She was last seen by us in Sept 2019 at which time she opted for conservative management. She complains of some pain in her legs when walking but states that it does not prevent her from doing any particular activity. It also sounds as if she has multiple possible etiologies to include arthritis and sciatica in addition to her known PAD. Interestingly, she recently developed a wound on the dorsum of her left foot. It was managed with local wound care, and she was given a prescription for antibiotics. The wound has healed well, and is almost completely gone at this point. Also, of note, her daughter relays that she previously had a significant negative response to sedation (propofol, versed, fentanyl) which occurred around 2018 when she had a pacemaker placed. It sounds as if she was hypoxic, hypotensive, and tachycardic requiring pressors and an approximately 4d ICU stay. The most recent PVL performed on 1/24/23 was reviewed and discussed with the patient:   ABIs essentially unchanged from previous with 0.43 on the left and the right is artificially elevated, but likely close to the same. Monophasic pedal waveforms. Physical Exam:    Visit Vitals  /60   Pulse 70   Ht 4' 8\" (1.422 m)   Wt 112 lb (50.8 kg)   SpO2 93%   BMI 25.11 kg/m²      HEENT-PERRLA exactly movements intact, no scleral icterus, mucous membranes pink and moist  Neck-no JVD, no carotid bruits  Extremities-no clubbing, cyanosis or edema. Left foot with small wound with scab in place. Measures approximately 3mm. No significant surrounding erythema. No drainage.   Pulses-Bilateral doppler signals dorsalis pedis, posterior tibial - MONOPHASIC       Past Medical History:   Diagnosis Date    A-fib [facility])     Arthritis     Breast cancer ([facility] 2002    left breast    CAD (coronary artery disease)     Cancer ([facility] [address]     breast and colon    Colon cancer ([facility] [address] 2007    Coronary stent patent 2016    GERD (gastroesophageal reflux disease)     High cholesterol     Hypertension     Pacemaker 12/2018    Peripheral neuropathy     hands    Urinary incontinence      Past Surgical History:   Procedure Laterality Date    HX APPENDECTOMY      HX BREAST BIOPSY Left 2006    lumpectomy    HX CARPAL TUNNEL RELEASE Left     HX CORONARY [address], 09/09/2016    HX HYSTERECTOMY      HX OTHER SURGICAL      bladder surgeries x3    HX PACEMAKER PLACEMENT  12/07/2018    CA UNLISTED PROCEDURE ABDOMEN PERITONEUM & OMENTUM  2006    colon resection with anastomosis     Patient Active Problem List   Diagnosis Code    Bulge of cervical disc without myelopathy M50.30    Bulge of thoracic disc without myelopathy M51.34    Bulge of lumbar disc without myelopathy M51.36    Cervical spondylosis without myelopathy M47.812    Thoracic spondylosis without myelopathy M47.814    Lumbosacral spondylosis without myelopathy M47.817    Spinal stenosis of lumbar region M48.061    Cervical neuritis M54.12    Lumbar neuritis M54.16    DDD (degenerative disc disease), cervical M50.30    DDD (degenerative disc disease), thoracic M51.34    DDD (degenerative disc disease), lumbar M51.36    Coronary artery disease involving native coronary artery of native heart with angina pectoris ([facility] [address] I25.119    Recurrent UTI N39.0    DDD (degenerative disc disease), lumbosacral M51.37    Essential hypertension I10    Other cervical disc displacement, unspecified cervical region M50.20    Other intervertebral disc displacement, thoracic region M51.24    Other intervertebral disc displacement, lumbar region M51.26    Spondylosis without myelopathy or radiculopathy, cervical region M47.812    Personal history of colon cancer Z85.038    Incontinence of feces R15.9    Atrial fibrillation ([facility]) I48.91    Overactive bladder N32.81    Type 2 diabetes mellitus without complication, without long-term current use of insulin ([facility]) E11.9    Exudative age-related macular degeneration, right eye, with active choroidal neovascularization ([facility]) H35.3211    Chronic obstructive pulmonary disease, unspecified COPD type ([facility] J44.9    Peripheral vascular disease ([facility] I73.9    Thrombocytopenia, unspecified ([facility] D69.6    Right knee pain M25.561    Type 2 diabetes mellitus with chronic kidney disease ([facility] E11.22     Current Outpatient Medications   Medication Sig Dispense Refill    loperamide (IMMODIUM) 2 mg tablet Take 1 Tablet by mouth daily for 360 days. 90 Tablet 3    glipiZIDE (GLUCOTROL) 5 mg tablet 2 po q am ,1 po q pm 270 Tablet 2    Blood-Glucose Meter monitoring kit by Does Not Apply route daily. Brand covered by insurance 1 Kit 0    glucose blood VI test strips strip by Does Not Apply route daily. Brand covered by insurance 100 Strip 5    lancets misc 1 Package by Does Not Apply route daily. Brand covered by insurance 1 Each 11    amLODIPine (NORVASC) 10 mg tablet Take 10 mg by mouth daily. acetaminophen (Tylenol Extra Strength) 500 mg tablet Take 2 Tablets by mouth two (2) times daily as needed for Pain. 120 Tablet 0    methenamine hippurate (HIPREX) 1 gram tablet Take 1 Tablet by mouth two (2) times daily (with meals). 180 Tablet 3    losartan (COZAAR) 25 mg tablet TAKE 1 TABLET BY MOUTH DAILY. 30 Tablet 0    OTHER D-mannose PO      Premarin 0.625 mg/gram vaginal cream APPLY A SMALL AMOUNT TO AFFECTED AREA EVERY NIGHT AS DIRECTED 30 g 3    OTHER Focus [employer]      DERMOTIC OIL 0.01 % drop APPLY 1-2 DROPS PER EAR AS NEEDED      albuterol (PROVENTIL HFA, VENTOLIN HFA, PROAIR HFA) 90 mcg/actuation inhaler Take 2 Puffs by inhalation every six (6) hours as needed for Wheezing.  1 Inhaler 3    B infantis/B ani/B lorene/B bifid (PROBIOTIC 4X PO) Take  by mouth.      cranberry extract 650 mg cap Take 1 Cap by mouth daily. ketoconazole (NIZORAL) 2 % shampoo Use as directed daily prn 1 Bottle 5    apixaban (ELIQUIS) 2.5 mg tablet Take 2.5 mg by mouth two (2) times a day. sotalol (BETAPACE) 80 mg tablet Take 40 mg by mouth daily. nitroglycerin (NITROSTAT) 0.4 mg SL tablet One tab sublingual every 5 min for relief of pain x 3 as necessary. Report to ER if pain lasts longer than 30 min       Allergies   Allergen Reactions    Ciprofloxacin Nausea and Vomiting    Doxycycline Nausea and Vomiting    Novocain [Procaine] Swelling    Sulfa (Sulfonamide Antibiotics) Rash     Social History     Socioeconomic History    Marital status:      Spouse name: Not on file    Number of children: Not on file    Years of education: Not on file    Highest education level: Not on file   Occupational History    Occupation: retired   Tobacco Use    Smoking status: Former     Types: Cigarettes     Quit date: 1983     Years since quittin.5    Smokeless tobacco: Never   Vaping Use    Vaping Use: Never used   Substance and Sexual Activity    Alcohol use: Yes     Comment: rare    Drug use: No    Sexual activity: Not Currently   Other Topics Concern    Not on file   Social History Narrative    Not on file     Social Determinants of Health     Financial Resource Strain: Not on file   Food Insecurity: Not on file   Transportation Needs: Not on file   Physical Activity: Not on file   Stress: Not on file   Social Connections: Not on file   Intimate Partner Violence: Not on file   Housing Stability: Not on file     Family History   Problem Relation Age of Onset    Stroke Mother     Heart Disease Father         chf    Cancer Sister        Impression and Plan:  Paddy Romo is a 80 y.o. female with known moderate-severe PAD in [facility]. It does not sound as if her vascular disease is significantly limiting her lifestyle.   I am further encouraged by the fact that she was able to heal the wound on her left foot without difficulty. We had a long discussion regarding options at this point. We discussed possible arteriography, but they are not interested in pursuing this option at this time given the fact that it would require sedation and they would prefer to avoid this given her previous reaction. As she is showing good signs of healing, I feel that it is safe to forego any invasive procedure at this time; however, I warned them that if the wound fails to heal, or if it worsens, intervention may be required for limb salvage. They understand and will monitor her progress closely. We will plan for routine follow up in 1yr provided she continues to do well. We reviewed the plan with the patient and the patient understands.         Berny Alejandre MD

## 2023-03-22 ENCOUNTER — TELEPHONE (OUTPATIENT)
Age: 88
End: 2023-03-22

## 2023-04-14 ENCOUNTER — HOSPITAL ENCOUNTER (OUTPATIENT)
Facility: HOSPITAL | Age: 88
Setting detail: SPECIMEN
Discharge: HOME | End: 2023-04-17
Payer: MEDICARE

## 2023-04-14 DIAGNOSIS — E11.9 TYPE 2 DIABETES MELLITUS WITHOUT COMPLICATION, WITHOUT LONG-TERM CURRENT USE OF INSULIN (HCC): ICD-10-CM

## 2023-04-14 DIAGNOSIS — I70.223 ATHEROSCLEROSIS OF NATIVE ARTERIES OF EXTREMITIES WITH REST PAIN, BILATERAL LEGS (HCC): ICD-10-CM

## 2023-04-14 LAB
CHOLEST SERPL-MCNC: 238 MG/DL
EST. AVERAGE GLUCOSE BLD GHB EST-MCNC: 134 MG/DL
HBA1C MFR BLD: 6.3 % (ref 4.2–5.6)
HDLC SERPL-MCNC: 59 MG/DL (ref 40–60)
HDLC SERPL: 4 (ref 0–5)
LDLC SERPL CALC-MCNC: 154.8 MG/DL (ref 0–100)
LIPID PANEL: ABNORMAL
TRIGL SERPL-MCNC: 121 MG/DL
VLDLC SERPL CALC-MCNC: 24.2 MG/DL

## 2023-04-14 PROCEDURE — 80061 LIPID PANEL: CPT

## 2023-04-14 PROCEDURE — 83036 HEMOGLOBIN GLYCOSYLATED A1C: CPT

## 2023-04-14 PROCEDURE — 36415 COLL VENOUS BLD VENIPUNCTURE: CPT

## 2023-04-25 ENCOUNTER — OFFICE VISIT (OUTPATIENT)
Age: 88
End: 2023-04-25
Payer: MEDICARE

## 2023-04-25 VITALS
RESPIRATION RATE: 18 BRPM | HEART RATE: 58 BPM | WEIGHT: 114 LBS | TEMPERATURE: 97 F | SYSTOLIC BLOOD PRESSURE: 131 MMHG | BODY MASS INDEX: 25.64 KG/M2 | HEIGHT: 56 IN | OXYGEN SATURATION: 97 % | DIASTOLIC BLOOD PRESSURE: 54 MMHG

## 2023-04-25 DIAGNOSIS — M54.12 RIGHT CERVICAL RADICULOPATHY: ICD-10-CM

## 2023-04-25 DIAGNOSIS — E11.9 TYPE 2 DIABETES MELLITUS WITHOUT COMPLICATION, WITHOUT LONG-TERM CURRENT USE OF INSULIN (HCC): Primary | ICD-10-CM

## 2023-04-25 DIAGNOSIS — I10 ESSENTIAL (PRIMARY) HYPERTENSION: ICD-10-CM

## 2023-04-25 DIAGNOSIS — E78.2 MIXED HYPERLIPIDEMIA: ICD-10-CM

## 2023-04-25 DIAGNOSIS — I73.9 PERIPHERAL VASCULAR DISEASE, UNSPECIFIED (HCC): ICD-10-CM

## 2023-04-25 PROBLEM — E11.40 TYPE 2 DIABETES MELLITUS WITH DIABETIC NEUROPATHY (HCC): Status: ACTIVE | Noted: 2023-04-25

## 2023-04-25 PROCEDURE — 1090F PRES/ABSN URINE INCON ASSESS: CPT | Performed by: INTERNAL MEDICINE

## 2023-04-25 PROCEDURE — 99211 OFF/OP EST MAY X REQ PHY/QHP: CPT | Performed by: INTERNAL MEDICINE

## 2023-04-25 PROCEDURE — 1123F ACP DISCUSS/DSCN MKR DOCD: CPT | Performed by: INTERNAL MEDICINE

## 2023-04-25 PROCEDURE — 1036F TOBACCO NON-USER: CPT | Performed by: INTERNAL MEDICINE

## 2023-04-25 PROCEDURE — G8427 DOCREV CUR MEDS BY ELIG CLIN: HCPCS | Performed by: INTERNAL MEDICINE

## 2023-04-25 PROCEDURE — 99215 OFFICE O/P EST HI 40 MIN: CPT | Performed by: INTERNAL MEDICINE

## 2023-04-25 PROCEDURE — 3044F HG A1C LEVEL LT 7.0%: CPT | Performed by: INTERNAL MEDICINE

## 2023-04-25 PROCEDURE — G8417 CALC BMI ABV UP PARAM F/U: HCPCS | Performed by: INTERNAL MEDICINE

## 2023-04-25 RX ORDER — METHYLPREDNISOLONE 4 MG/1
TABLET ORAL
Qty: 1 KIT | Refills: 0 | Status: SHIPPED | OUTPATIENT
Start: 2023-04-25

## 2023-04-25 RX ORDER — ROSUVASTATIN CALCIUM 10 MG/1
10 TABLET, COATED ORAL DAILY
Qty: 90 TABLET | Refills: 1 | Status: SHIPPED | OUTPATIENT
Start: 2023-04-25

## 2023-04-25 ASSESSMENT — ENCOUNTER SYMPTOMS
SHORTNESS OF BREATH: 0
BLOOD IN STOOL: 0

## 2023-04-25 ASSESSMENT — PATIENT HEALTH QUESTIONNAIRE - PHQ9
SUM OF ALL RESPONSES TO PHQ QUESTIONS 1-9: 0
SUM OF ALL RESPONSES TO PHQ QUESTIONS 1-9: 0
1. LITTLE INTEREST OR PLEASURE IN DOING THINGS: 0
SUM OF ALL RESPONSES TO PHQ9 QUESTIONS 1 & 2: 0
SUM OF ALL RESPONSES TO PHQ QUESTIONS 1-9: 0
2. FEELING DOWN, DEPRESSED OR HOPELESS: 0
SUM OF ALL RESPONSES TO PHQ QUESTIONS 1-9: 0

## 2023-04-26 NOTE — PROGRESS NOTES
Selena Brandon presents today for   Chief Complaint   Patient presents with    Follow-up Chronic Condition                 1. \"Have you been to the ER, urgent care clinic since your last visit? Hospitalized since your last visit? \" no    2. \"Have you seen or consulted any other health care providers outside of the [address] [place] [address] since your last visit? \" no     3. For patients aged 39-70: Has the patient had a colonoscopy / FIT/ Cologuard? NA - based on age      If the patient is female:    4. For patients aged 41-77: Has the patient had a mammogram within the past 2 years? NA - based on age or sex      11. For patients aged 21-65: Has the patient had a pap smear?  NA - based on age or sex
right arm pain, hand numbness.,  Ordering of medications, and completion of chart    No follow-up provider specified.          Rosetta Cabrera MD

## 2023-06-27 ENCOUNTER — TELEPHONE (OUTPATIENT)
Age: 88
End: 2023-06-27

## 2023-07-17 ENCOUNTER — TELEPHONE (OUTPATIENT)
Age: 88
End: 2023-07-17

## 2023-07-17 DIAGNOSIS — E11.9 TYPE 2 DIABETES MELLITUS WITHOUT COMPLICATION, WITHOUT LONG-TERM CURRENT USE OF INSULIN (HCC): Primary | ICD-10-CM

## 2023-07-17 RX ORDER — GLIPIZIDE 5 MG/1
TABLET ORAL
Qty: 90 TABLET | Refills: 3 | Status: SHIPPED | OUTPATIENT
Start: 2023-07-17

## 2023-07-22 NOTE — ACP (ADVANCE CARE PLANNING)
Advance Directive:  1. Do you have an advance directive in place? Patient Reply: Yes     2. If not, would you like material regarding how to put one in place?  Patient Reply: No
20

## 2023-08-07 ENCOUNTER — HOSPITAL ENCOUNTER (OUTPATIENT)
Facility: HOSPITAL | Age: 88
Discharge: HOME | End: 2023-08-10
Payer: MEDICARE

## 2023-08-07 LAB
CHOLEST SERPL-MCNC: 131 MG/DL
EST. AVERAGE GLUCOSE BLD GHB EST-MCNC: 137 MG/DL
HBA1C MFR BLD: 6.4 % (ref 4.2–5.6)
HDLC SERPL-MCNC: 54 MG/DL (ref 40–60)
HDLC SERPL: 2.4 (ref 0–5)
LDLC SERPL CALC-MCNC: 59.2 MG/DL (ref 0–100)
LIPID PANEL: NORMAL
TRIGL SERPL-MCNC: 89 MG/DL
VLDLC SERPL CALC-MCNC: 17.8 MG/DL

## 2023-08-07 PROCEDURE — 83036 HEMOGLOBIN GLYCOSYLATED A1C: CPT

## 2023-08-07 PROCEDURE — 80061 LIPID PANEL: CPT

## 2023-08-07 PROCEDURE — 36415 COLL VENOUS BLD VENIPUNCTURE: CPT

## 2023-08-24 ENCOUNTER — OFFICE VISIT (OUTPATIENT)
Age: 88
End: 2023-08-24
Payer: MEDICARE

## 2023-08-24 ENCOUNTER — HOSPITAL ENCOUNTER (OUTPATIENT)
Facility: HOSPITAL | Age: 88
Setting detail: SPECIMEN
Discharge: HOME | End: 2023-08-24
Payer: MEDICARE

## 2023-08-24 VITALS
HEART RATE: 73 BPM | RESPIRATION RATE: 18 BRPM | HEIGHT: 56 IN | DIASTOLIC BLOOD PRESSURE: 37 MMHG | WEIGHT: 113 LBS | OXYGEN SATURATION: 97 % | SYSTOLIC BLOOD PRESSURE: 120 MMHG | BODY MASS INDEX: 25.42 KG/M2 | TEMPERATURE: 98.3 F
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
## 2023-08-24 DIAGNOSIS — F50.89 PICA: ICD-10-CM

## 2023-08-24 DIAGNOSIS — L21.9 SEBORRHEA: ICD-10-CM

## 2023-08-24 DIAGNOSIS — G25.81 RESTLESS LEGS: ICD-10-CM

## 2023-08-24 DIAGNOSIS — E11.9 TYPE 2 DIABETES MELLITUS WITHOUT COMPLICATION, WITHOUT LONG-TERM CURRENT USE OF INSULIN (HCC): ICD-10-CM

## 2023-08-24 DIAGNOSIS — Z00.00 MEDICARE ANNUAL WELLNESS VISIT, SUBSEQUENT: Primary | ICD-10-CM

## 2023-08-24 DIAGNOSIS — I10 ESSENTIAL (PRIMARY) HYPERTENSION: ICD-10-CM

## 2023-08-24 LAB
IRON SATN MFR SERPL: 32 % (ref 20–50)
IRON SERPL-MCNC: 98 UG/DL (ref 50–175)
TIBC SERPL-MCNC: 307 UG/DL (ref 250–450)

## 2023-08-24 PROCEDURE — 3044F HG A1C LEVEL LT 7.0%: CPT | Performed by: INTERNAL MEDICINE

## 2023-08-24 PROCEDURE — 83550 IRON BINDING TEST: CPT

## 2023-08-24 PROCEDURE — G8417 CALC BMI ABV UP PARAM F/U: HCPCS | Performed by: INTERNAL MEDICINE

## 2023-08-24 PROCEDURE — G8427 DOCREV CUR MEDS BY ELIG CLIN: HCPCS | Performed by: INTERNAL MEDICINE

## 2023-08-24 PROCEDURE — 83540 ASSAY OF IRON: CPT

## 2023-08-24 PROCEDURE — 99497 ADVNCD CARE PLAN 30 MIN: CPT | Performed by: INTERNAL MEDICINE

## 2023-08-24 PROCEDURE — 99214 OFFICE O/P EST MOD 30 MIN: CPT | Performed by: INTERNAL MEDICINE

## 2023-08-24 PROCEDURE — 1036F TOBACCO NON-USER: CPT | Performed by: INTERNAL MEDICINE

## 2023-08-24 PROCEDURE — 1123F ACP DISCUSS/DSCN MKR DOCD: CPT | Performed by: INTERNAL MEDICINE

## 2023-08-24 PROCEDURE — 36415 COLL VENOUS BLD VENIPUNCTURE: CPT

## 2023-08-24 PROCEDURE — 1090F PRES/ABSN URINE INCON ASSESS: CPT | Performed by: INTERNAL MEDICINE

## 2023-08-24 PROCEDURE — G0439 PPPS, SUBSEQ VISIT: HCPCS | Performed by: INTERNAL MEDICINE

## 2023-08-24 RX ORDER — KETOCONAZOLE 20 MG/ML
SHAMPOO TOPICAL
Qty: 1 EACH | Refills: 5 | Status: SHIPPED | OUTPATIENT
Start: 2023-08-24

## 2023-08-24 SDOH — ECONOMIC STABILITY: FOOD INSECURITY: WITHIN THE PAST 12 MONTHS, YOU WORRIED THAT YOUR FOOD WOULD RUN OUT BEFORE YOU GOT MONEY TO BUY MORE.: NEVER TRUE

## 2023-08-24 SDOH — ECONOMIC STABILITY: FOOD INSECURITY: WITHIN THE PAST 12 MONTHS, THE FOOD YOU BOUGHT JUST DIDN'T LAST AND YOU DIDN'T HAVE MONEY TO GET MORE.: NEVER TRUE

## 2023-08-24 SDOH — ECONOMIC STABILITY: HOUSING INSECURITY
IN THE LAST 12 MONTHS, WAS THERE A TIME WHEN YOU DID NOT HAVE A STEADY PLACE TO SLEEP OR SLEPT IN A SHELTER (INCLUDING NOW)?: NO

## 2023-08-24 SDOH — ECONOMIC STABILITY: INCOME INSECURITY: HOW HARD IS IT FOR YOU TO PAY FOR THE VERY BASICS LIKE FOOD, HOUSING, MEDICAL CARE, AND HEATING?: NOT HARD AT ALL

## 2023-08-24 ASSESSMENT — PATIENT HEALTH QUESTIONNAIRE - PHQ9
SUM OF ALL RESPONSES TO PHQ QUESTIONS 1-9: 0
2. FEELING DOWN, DEPRESSED OR HOPELESS: 0
1. LITTLE INTEREST OR PLEASURE IN DOING THINGS: 0
SUM OF ALL RESPONSES TO PHQ9 QUESTIONS 1 & 2: 0

## 2023-08-24 ASSESSMENT — ENCOUNTER SYMPTOMS
BLOOD IN STOOL: 0
SHORTNESS OF BREATH: 0
BACK PAIN: 1

## 2023-08-24 ASSESSMENT — LIFESTYLE VARIABLES: HOW MANY STANDARD DRINKS CONTAINING ALCOHOL DO YOU HAVE ON A TYPICAL DAY: PATIENT DOES NOT DRINK

## 2023-08-24 NOTE — PROGRESS NOTES
Luigi Ortiz presents today for   Chief Complaint   Patient presents with    Medicare AWV                 1. \"Have you been to the ER, urgent care clinic since your last visit? Hospitalized since your last visit? \" no    2. \"Have you seen or consulted any other health care providers outside of the [address] [place] [address] since your last visit? \" no     3. For patients aged 43-73: Has the patient had a colonoscopy / FIT/ Cologuard? NA - based on age      If the patient is female:    4. For patients aged 43-66: Has the patient had a mammogram within the past 2 years? NA - based on age or sex      11. For patients aged 21-65: Has the patient had a pap smear?  NA - based on age or sex
tablet Take 1 tablet by mouth daily Yes Ar Automatic Reconciliation   apixaban (ELIQUIS) 2.5 MG TABS tablet Take 1 tablet by mouth 2 times daily Yes Ar Automatic Reconciliation   fluocinolone (DERMOTIC) 0.01 % OIL oil APPLY 1-2 DROPS PER EAR AS NEEDED Yes Ar Automatic Reconciliation   ketoconazole (NIZORAL) 2 % shampoo Use as directed daily prn Yes Ar Automatic Reconciliation   loperamide (IMODIUM A-D) 2 MG tablet Take 1 tablet by mouth daily Yes Ar Automatic Reconciliation   losartan (COZAAR) 25 MG tablet TAKE 1 TABLET BY MOUTH DAILY. Yes Ar Automatic Reconciliation   nitroGLYCERIN (NITROSTAT) 0.4 MG SL tablet One tab sublingual every 5 min for relief of pain x 3 as necessary.   Report to ER if pain lasts longer than 30 min Yes Ar Automatic Reconciliation   sotalol (BETAPACE) 80 MG tablet Take 0.5 tablets by mouth daily Yes Ar Automatic Reconciliation   GEMTESA 75 MG TABS tablet Take 1 tablet by mouth daily  Patient not taking: Reported on 8/24/2023  Historical Provider, MD   amLODIPine (NORVASC) 5 MG tablet Take 1 tablet by mouth daily  Patient not taking: Reported on 8/24/2023  Historical Provider, MD Mendiola (Including outside providers/suppliers regularly involved in providing care):   Patient Care Team:  Travis Tena MD as PCP - Reed Mcnulty MD as PCP - Empaneled Provider  Kenn Morris MD as Physician     Reviewed and updated this visit:  Tobacco  Allergies  Meds  Med Hx  Surg Hx  Soc Hx  Fam Hx

## 2023-08-24 NOTE — ACP (ADVANCE CARE PLANNING)
Advance Care Planning     Advance Care Planning (ACP) Physician/NP/PA Conversation    Date of Conversation: 8/24/2023  Conducted with: Patient with Decision Making Capacity    Healthcare Decision Maker:      Primary Decision Maker: Keene Simmonds - Child - [phone]    Secondary Decision Maker: Chasidy Morley Spouse - [phone]    Secondary Decision Maker: [place] Pellet - Child - [phone]    Click here to complete Healthcare Decision Makers including selection of the Healthcare Decision Maker Relationship (ie \"Primary\")      Care Preferences:    Hospitalization: \"If your health worsens and it becomes clear that your chance of recovery is unlikely, what would be your preference regarding hospitalization? \"  The patient would prefer comfort-focused treatment without hospitalization. Ventilation: \"If you were unable to breath on your own and your chance of recovery was unlikely, what would be your preference about the use of a ventilator (breathing machine) if it was available to you? \"  The patient would NOT desire the use of a ventilator. Resuscitation: \"In the event your heart stopped as a result of an underlying serious health condition, would you want attempts made to restart your heart, or would you prefer a natural death? \"  No, do NOT attempt to resuscitate.     artificial nutrition no feeding tubes    Conversation Outcomes / Follow-Up Plan:  ACP complete - no further action today  Reviewed DNR/DNI and patient elects DNR order - completed portable DNR form & placed order    Length of Voluntary ACP Conversation in minutes:  16 minutes    Cheyanne Pastrana MD

## 2023-08-25 ENCOUNTER — TELEPHONE (OUTPATIENT)
Age: 88
End: 2023-08-25

## 2023-08-29 ENCOUNTER — TELEPHONE (OUTPATIENT)
Age: 88
End: 2023-08-29

## 2023-08-29 DIAGNOSIS — I25.119 CORONARY ARTERY DISEASE INVOLVING NATIVE CORONARY ARTERY OF NATIVE HEART WITH ANGINA PECTORIS (HCC): Primary | ICD-10-CM

## 2023-08-29 RX ORDER — NITROGLYCERIN 0.4 MG/1
0.4 TABLET SUBLINGUAL EVERY 5 MIN PRN
Qty: 25 TABLET | Refills: 5 | Status: SHIPPED | OUTPATIENT
Start: 2023-08-29

## 2023-08-29 NOTE — TELEPHONE ENCOUNTER
Please send refill to [facility]    nitroGLYCERIN (NITROSTAT) 0.4 MG SL tablet   10/5/2017     Sig: One tab sublingual every 5 min for relief of pain x 3 as necessary.   Report to ER if pain lasts longer than 30 min

## 2023-09-15 ENCOUNTER — TELEPHONE (OUTPATIENT)
Age: 88
End: 2023-09-15

## 2023-09-15 NOTE — TELEPHONE ENCOUNTER
Patient is aware of message and verbalizes understanding. Daughter wanted to know if she needs to be on a fluid restriction until seen by Cardiology. Daughter states she will discuss this with Dr. He Nieves at Monday appointment.

## 2023-09-15 NOTE — TELEPHONE ENCOUNTER
Patient daughter called in stating that her mother was d/c from hospital and they put her on a sliding scale insulin and took her completely off  glipizide. Last night her blood sugar 216 after she ate and then this morning it was 156. The daughter states that she is going started her back on the glipizide because she is not sure why they took her off. Please Advise

## 2023-09-15 NOTE — TELEPHONE ENCOUNTER
I think they took her off the glipizide because it is one of the diabetes medications that can cause low blood sugar.   I think it is fine to start back on the glipizide, but skip it on days that she is not eating because of nausea, etc.

## 2023-09-18 ENCOUNTER — OFFICE VISIT (OUTPATIENT)
Age: 88
End: 2023-09-18

## 2023-09-18 VITALS
HEIGHT: 56 IN | TEMPERATURE: 97.6 F | RESPIRATION RATE: 18 BRPM | HEART RATE: 71 BPM | SYSTOLIC BLOOD PRESSURE: 97 MMHG | DIASTOLIC BLOOD PRESSURE: 34 MMHG | BODY MASS INDEX: 24.75 KG/M2 | WEIGHT: 110 LBS | OXYGEN SATURATION: 95 %

## 2023-09-18 DIAGNOSIS — N17.9: ICD-10-CM

## 2023-09-18 DIAGNOSIS — J96.01 ACUTE RESPIRATORY FAILURE WITH HYPOXIA (HCC): ICD-10-CM

## 2023-09-18 DIAGNOSIS — E11.9 TYPE 2 DIABETES MELLITUS WITHOUT COMPLICATION, WITHOUT LONG-TERM CURRENT USE OF INSULIN (HCC): ICD-10-CM

## 2023-09-18 DIAGNOSIS — I05.0 MITRAL VALVE STENOSIS, UNSPECIFIED ETIOLOGY: ICD-10-CM

## 2023-09-18 DIAGNOSIS — Z09 HOSPITAL DISCHARGE FOLLOW-UP: Primary | ICD-10-CM

## 2023-09-18 ASSESSMENT — PATIENT HEALTH QUESTIONNAIRE - PHQ9
1. LITTLE INTEREST OR PLEASURE IN DOING THINGS: 0
2. FEELING DOWN, DEPRESSED OR HOPELESS: 0
SUM OF ALL RESPONSES TO PHQ QUESTIONS 1-9: 0
SUM OF ALL RESPONSES TO PHQ9 QUESTIONS 1 & 2: 0

## 2023-09-18 NOTE — PROGRESS NOTES
Casandragurjitsenajason Alicia presents today for   Chief Complaint   Patient presents with    Follow-Up from Hospital                 1. \"Have you been to the ER, urgent care clinic since your last visit? Hospitalized since your last visit? \" Yes 9-6-2023 lindsey Wade    2. \"Have you seen or consulted any other health care providers outside of the [address] [place] [address] since your last visit? \" no     3. For patients aged 43-73: Has the patient had a colonoscopy / FIT/ Cologuard? NA - based on age      If the patient is female:    4. For patients aged 43-66: Has the patient had a mammogram within the past 2 years? NA - based on age or sex      11. For patients aged 21-65: Has the patient had a pap smear?  NA - based on age or sex

## 2023-10-01 ASSESSMENT — ENCOUNTER SYMPTOMS: SHORTNESS OF BREATH: 0

## 2023-10-17 ENCOUNTER — TELEPHONE (OUTPATIENT)
Age: 88
End: 2023-10-17

## 2023-10-17 DIAGNOSIS — R53.1 WEAKNESS: ICD-10-CM

## 2023-10-17 DIAGNOSIS — I73.9 PERIPHERAL VASCULAR DISEASE (HCC): ICD-10-CM

## 2023-10-17 DIAGNOSIS — I05.0 MITRAL VALVE STENOSIS, UNSPECIFIED ETIOLOGY: Primary | ICD-10-CM

## 2023-10-17 DIAGNOSIS — M79.604 PAIN IN RIGHT LEG: ICD-10-CM

## 2023-10-17 DIAGNOSIS — I50.21 ACUTE SYSTOLIC CONGESTIVE HEART FAILURE (HCC): ICD-10-CM

## 2023-10-17 NOTE — TELEPHONE ENCOUNTER
[id number] Virginia Solo called in from [facility] stating that patient xray was negative but there is a significant decline in patient function. Patient is not walking and is in a lot of pain. Patient family is in the healthcare field and think palliative care is a good fit, she will also need pain management.  Please Advise    [id number] Virginia Solo can be reached at [phone]

## 2023-10-17 NOTE — TELEPHONE ENCOUNTER
Let Tomer Miller know that I have sent a palliative care referral to [facility], to include evaluation for pain medication needs

## 2023-10-18 ENCOUNTER — TELEMEDICINE (OUTPATIENT)
Age: 88
End: 2023-10-18
Payer: MEDICARE

## 2023-10-18 DIAGNOSIS — R05.2 SUBACUTE COUGH: ICD-10-CM

## 2023-10-18 DIAGNOSIS — M54.50 ACUTE MIDLINE LOW BACK PAIN, UNSPECIFIED WHETHER SCIATICA PRESENT: Primary | ICD-10-CM

## 2023-10-18 DIAGNOSIS — I05.0 MITRAL VALVE STENOSIS, UNSPECIFIED ETIOLOGY: ICD-10-CM

## 2023-10-18 DIAGNOSIS — Z51.5 PALLIATIVE CARE PATIENT: ICD-10-CM

## 2023-10-18 PROCEDURE — 1123F ACP DISCUSS/DSCN MKR DOCD: CPT | Performed by: INTERNAL MEDICINE

## 2023-10-18 PROCEDURE — 1036F TOBACCO NON-USER: CPT | Performed by: INTERNAL MEDICINE

## 2023-10-18 PROCEDURE — 1090F PRES/ABSN URINE INCON ASSESS: CPT | Performed by: INTERNAL MEDICINE

## 2023-10-18 PROCEDURE — G8484 FLU IMMUNIZE NO ADMIN: HCPCS | Performed by: INTERNAL MEDICINE

## 2023-10-18 PROCEDURE — 99213 OFFICE O/P EST LOW 20 MIN: CPT | Performed by: INTERNAL MEDICINE

## 2023-10-18 PROCEDURE — G8427 DOCREV CUR MEDS BY ELIG CLIN: HCPCS | Performed by: INTERNAL MEDICINE

## 2023-10-18 PROCEDURE — G8420 CALC BMI NORM PARAMETERS: HCPCS | Performed by: INTERNAL MEDICINE

## 2023-10-18 RX ORDER — CYCLOBENZAPRINE HCL 10 MG
TABLET ORAL
Qty: 30 TABLET | Refills: 1 | Status: SHIPPED | OUTPATIENT
Start: 2023-10-18

## 2023-10-18 ASSESSMENT — PATIENT HEALTH QUESTIONNAIRE - PHQ9
SUM OF ALL RESPONSES TO PHQ9 QUESTIONS 1 & 2: 0
SUM OF ALL RESPONSES TO PHQ QUESTIONS 1-9: 0
2. FEELING DOWN, DEPRESSED OR HOPELESS: 0
SUM OF ALL RESPONSES TO PHQ QUESTIONS 1-9: 0
SUM OF ALL RESPONSES TO PHQ QUESTIONS 1-9: 0
1. LITTLE INTEREST OR PLEASURE IN DOING THINGS: 0
SUM OF ALL RESPONSES TO PHQ QUESTIONS 1-9: 0

## 2023-10-18 ASSESSMENT — ENCOUNTER SYMPTOMS
COUGH: 1
BACK PAIN: 1

## 2023-10-18 NOTE — PROGRESS NOTES
Nereyda Gasca, was evaluated through a synchronous (real-time) audio-video encounter. The patient (or guardian if applicable) is aware that this is a billable service, which includes applicable co-pays. This Virtual Visit was conducted with patient's (and/or legal guardian's) consent. Patient identification was verified, and a caregiver was present when appropriate. The patient was located at Home: [address] [facility] [facility] [address] [id number]  Provider was located at [facility] (Appt Dept): [facility], [address],  [address]      Nereyda Gasca (:  1926) is a Established patient, presenting virtually for evaluation of the following:    Assessment & Plan   Below is the assessment and plan developed based on review of pertinent history, physical exam, labs, studies, and medications. 1. Acute midline low back pain, unspecified whether sciatica present  Comments:  Posttraumatic, L3-4 narrowing on x-ray, no obvious vertebral fracture. Tylenol arthritis 650 mg 2 twice daily, low-dose Flexeril, call if persists  Orders:  -     cyclobenzaprine (FLEXERIL) 10 MG tablet; 1/2 to 1 po q 8 hrs PRN muscle pain/ spasm. Caution: sedating, Disp-30 tablet, R-1Normal  2. Mitral valve stenosis, unspecified etiology  Comments:  Daughter thinking of canceling cardiology appointment since mother is not a surgical candidate. Advised this is not unreasonable. 3. Subacute cough  Comments: With report of \"crackly lungs\". Agree with incentive spirometer, call for a trial of Lasix if dyspnea, PND, orthopnea, or cough persists  4. Palliative care patient  Comments:  Palliative care referral was placed yesterday, daughter aware they will contact her. No follow-ups on file. Subjective       Fall  Pertinent negatives include no fever. Virtual visit with Estiven Asif's daughter to discuss palliative care, questions about cardiology appointment, and recent pain due to fall.

## 2023-10-20 ENCOUNTER — TELEPHONE (OUTPATIENT)
Age: 88
End: 2023-10-20

## 2023-10-20 DIAGNOSIS — M25.552 LEFT HIP PAIN: Primary | ICD-10-CM

## 2023-10-20 RX ORDER — TRAMADOL HYDROCHLORIDE 50 MG/1
50 TABLET ORAL EVERY 6 HOURS PRN
Qty: 28 TABLET | Refills: 0 | Status: SHIPPED | OUTPATIENT
Start: 2023-10-20 | End: 2023-10-27

## 2023-10-20 NOTE — TELEPHONE ENCOUNTER
Pt daughter came into office , stating pt has been put on flexeril for pain pt daughter states it has helped a ittle , but pt is in so much pain and her legs are so weak she does not want to move   Daughter is asking what can be done next .   Bill Clark -  [phone]

## 2023-10-20 NOTE — TELEPHONE ENCOUNTER
[place] with [facility] also called and states they are faxing over an order for Dr Joi Jonas to review, they believe patient may be a hospice candidate. Please advise, thank you.

## 2023-10-23 NOTE — TELEPHONE ENCOUNTER
Patients daughter called and is checking on a hospice order that was faxed over by home health. Roro Amaya is the nurse who faxed it from [facility] and their fax number is [phone]. She also stated their e-mail address is [email or web address]    Dees phone number is [phone]

## 2023-10-24 NOTE — TELEPHONE ENCOUNTER
Nadine Jones with [facility] called to let Dr Yannick Alexander know that patient was admitted to their services yesterday. She can be reached at [phone] with any questions, thank you.

## 2024-11-02 SDOH — ECONOMIC STABILITY: TRANSPORTATION INSECURITY
IN THE PAST 12 MONTHS, HAS LACK OF TRANSPORTATION KEPT YOU FROM MEETINGS, WORK, OR FROM GETTING THINGS NEEDED FOR DAILY LIVING?: PATIENT DECLINED

## 2024-11-02 SDOH — ECONOMIC STABILITY: FOOD INSECURITY: WITHIN THE PAST 12 MONTHS, YOU WORRIED THAT YOUR FOOD WOULD RUN OUT BEFORE YOU GOT MONEY TO BUY MORE.: PATIENT DECLINED

## 2024-11-02 SDOH — ECONOMIC STABILITY: INCOME INSECURITY: HOW HARD IS IT FOR YOU TO PAY FOR THE VERY BASICS LIKE FOOD, HOUSING, MEDICAL CARE, AND HEATING?: PATIENT DECLINED

## 2024-11-02 SDOH — ECONOMIC STABILITY: FOOD INSECURITY: WITHIN THE PAST 12 MONTHS, THE FOOD YOU BOUGHT JUST DIDN'T LAST AND YOU DIDN'T HAVE MONEY TO GET MORE.: PATIENT DECLINED

## 2024-11-04 ENCOUNTER — OFFICE VISIT (OUTPATIENT)
Facility: CLINIC | Age: 89
End: 2024-11-04

## 2024-11-04 VITALS
SYSTOLIC BLOOD PRESSURE: 139 MMHG | DIASTOLIC BLOOD PRESSURE: 51 MMHG | OXYGEN SATURATION: 96 % | TEMPERATURE: 98.2 F | WEIGHT: 111 LBS | HEIGHT: 56 IN | RESPIRATION RATE: 18 BRPM | BODY MASS INDEX: 24.97 KG/M2 | HEART RATE: 78 BPM

## 2024-11-04 DIAGNOSIS — I10 ESSENTIAL (PRIMARY) HYPERTENSION: ICD-10-CM

## 2024-11-04 DIAGNOSIS — E78.5 HYPERLIPIDEMIA, UNSPECIFIED HYPERLIPIDEMIA TYPE: ICD-10-CM

## 2024-11-04 DIAGNOSIS — I05.0 MITRAL VALVE STENOSIS, UNSPECIFIED ETIOLOGY: Primary | ICD-10-CM

## 2024-11-04 DIAGNOSIS — E11.9 TYPE 2 DIABETES MELLITUS WITHOUT COMPLICATION, WITHOUT LONG-TERM CURRENT USE OF INSULIN (HCC): ICD-10-CM

## 2024-11-04 DIAGNOSIS — I48.20 ATRIAL FIBRILLATION, CHRONIC (HCC): ICD-10-CM

## 2024-11-04 RX ORDER — GLIPIZIDE 5 MG/1
TABLET ORAL
Qty: 90 TABLET | Refills: 0 | Status: SHIPPED | OUTPATIENT
Start: 2024-11-04

## 2024-11-04 RX ORDER — GLIPIZIDE 5 MG/1
TABLET ORAL
Qty: 90 TABLET | Refills: 3 | Status: SHIPPED | OUTPATIENT
Start: 2024-11-04 | End: 2024-11-04

## 2024-11-04 SDOH — ECONOMIC STABILITY: FOOD INSECURITY: WITHIN THE PAST 12 MONTHS, YOU WORRIED THAT YOUR FOOD WOULD RUN OUT BEFORE YOU GOT MONEY TO BUY MORE.: NEVER TRUE

## 2024-11-04 SDOH — ECONOMIC STABILITY: FOOD INSECURITY: WITHIN THE PAST 12 MONTHS, THE FOOD YOU BOUGHT JUST DIDN'T LAST AND YOU DIDN'T HAVE MONEY TO GET MORE.: NEVER TRUE

## 2024-11-04 SDOH — ECONOMIC STABILITY: INCOME INSECURITY: HOW HARD IS IT FOR YOU TO PAY FOR THE VERY BASICS LIKE FOOD, HOUSING, MEDICAL CARE, AND HEATING?: NOT HARD AT ALL

## 2024-11-04 ASSESSMENT — PATIENT HEALTH QUESTIONNAIRE - PHQ9
SUM OF ALL RESPONSES TO PHQ QUESTIONS 1-9: 0
2. FEELING DOWN, DEPRESSED OR HOPELESS: NOT AT ALL
SUM OF ALL RESPONSES TO PHQ9 QUESTIONS 1 & 2: 0
SUM OF ALL RESPONSES TO PHQ QUESTIONS 1-9: 0
1. LITTLE INTEREST OR PLEASURE IN DOING THINGS: NOT AT ALL
SUM OF ALL RESPONSES TO PHQ QUESTIONS 1-9: 0
SUM OF ALL RESPONSES TO PHQ QUESTIONS 1-9: 0

## 2024-11-04 ASSESSMENT — ENCOUNTER SYMPTOMS
EYES NEGATIVE: 1
GASTROINTESTINAL NEGATIVE: 1
RESPIRATORY NEGATIVE: 1
ALLERGIC/IMMUNOLOGIC NEGATIVE: 1

## 2024-11-04 NOTE — ASSESSMENT & PLAN NOTE
Heart rate under control on current medications.  Eliquis for stroke prophylaxis.  Patient follows with cardiology.

## 2024-11-04 NOTE — PROGRESS NOTES
Amita Asif (: 1926) is a 89 y.o. female, here for evaluation of the following chief complaint(s):  Follow-up and Hypertension       ASSESSMENT/PLAN:  Below is the assessment and plan developed based on review of pertinent history, physical exam, labs, studies, and medications.    1. Mitral valve stenosis, unspecified etiology  2. Type 2 diabetes mellitus without complication, without long-term current use of insulin (HCC)  Assessment & Plan:   Stable.  To continue on current medications.  Orders:  -     glipiZIDE (GLUCOTROL) 5 MG tablet; 2 po q am ,1 po q pm, Disp-90 tablet, R-0Normal  3. Essential (primary) hypertension  Assessment & Plan:   Blood pressure is stable on current medications.  4. Hyperlipidemia, unspecified hyperlipidemia type  Assessment & Plan:   Stable on Crestor.    5. Atrial fibrillation, chronic (HCC)  Assessment & Plan:   Heart rate under control on current medications.  Eliquis for stroke prophylaxis.  Patient follows with cardiology.  Orders:  -     apixaban (ELIQUIS) 2.5 MG TABS tablet; Take 1 tablet by mouth 2 times daily, Disp-60 tablet, R-1Normal      Return in about 3 months (around 2025).     SUBJECTIVE/OBJECTIVE:  Hypertension    Patient is accompanied by her son-in-law who is a retired internist.    Patient was recently discharged from hospice services.      Patient is doing fine.  No chest pain or palpitations or shortness of breath.  No GI/ symptoms.  No neuro/psych symptoms.    The son-in-law is requesting for refill on glipizide and Eliquis.  Son-in-law confirmed the patient has been on same doses of glipizide and Eliquis.    No new complaints or concerns    Review of Systems   Constitutional: Negative.  Negative for activity change.   HENT: Negative.     Eyes: Negative.    Respiratory: Negative.     Cardiovascular: Negative.    Gastrointestinal: Negative.    Endocrine: Negative.    Genitourinary: Negative.    Musculoskeletal: Negative.    Skin: Negative.

## 2024-11-18 ENCOUNTER — TELEPHONE (OUTPATIENT)
Facility: CLINIC | Age: 89
End: 2024-11-18

## 2024-11-29 ENCOUNTER — OFFICE VISIT (OUTPATIENT)
Facility: CLINIC | Age: 88
End: 2024-11-29

## 2024-11-29 VITALS
SYSTOLIC BLOOD PRESSURE: 139 MMHG | BODY MASS INDEX: 25.19 KG/M2 | TEMPERATURE: 97.1 F | WEIGHT: 112 LBS | HEIGHT: 56 IN | DIASTOLIC BLOOD PRESSURE: 52 MMHG | RESPIRATION RATE: 20 BRPM | HEART RATE: 57 BPM | OXYGEN SATURATION: 97 %

## 2024-11-29 DIAGNOSIS — D69.6 THROMBOCYTOPENIA, UNSPECIFIED (HCC): ICD-10-CM

## 2024-11-29 DIAGNOSIS — N39.0 RECURRENT UTI: ICD-10-CM

## 2024-11-29 DIAGNOSIS — Z71.89 ACP (ADVANCE CARE PLANNING): ICD-10-CM

## 2024-11-29 DIAGNOSIS — Z95.0 PACEMAKER: ICD-10-CM

## 2024-11-29 DIAGNOSIS — I25.119 CORONARY ARTERY DISEASE INVOLVING NATIVE CORONARY ARTERY OF NATIVE HEART WITH ANGINA PECTORIS (HCC): ICD-10-CM

## 2024-11-29 DIAGNOSIS — I73.9 PERIPHERAL VASCULAR DISEASE, UNSPECIFIED (HCC): ICD-10-CM

## 2024-11-29 DIAGNOSIS — J44.9 CHRONIC OBSTRUCTIVE PULMONARY DISEASE, UNSPECIFIED COPD TYPE (HCC): ICD-10-CM

## 2024-11-29 DIAGNOSIS — H35.3211 EXUDATIVE AGE-RELATED MACULAR DEGENERATION, RIGHT EYE, WITH ACTIVE CHOROIDAL NEOVASCULARIZATION (HCC): ICD-10-CM

## 2024-11-29 DIAGNOSIS — E11.40 TYPE 2 DIABETES MELLITUS WITH DIABETIC NEUROPATHY, WITHOUT LONG-TERM CURRENT USE OF INSULIN (HCC): ICD-10-CM

## 2024-11-29 DIAGNOSIS — L30.9 ECZEMA, UNSPECIFIED TYPE: ICD-10-CM

## 2024-11-29 DIAGNOSIS — I70.223 ATHEROSCLEROSIS OF NATIVE ARTERIES OF EXTREMITIES WITH REST PAIN, BILATERAL LEGS (HCC): ICD-10-CM

## 2024-11-29 DIAGNOSIS — L21.9 SEBORRHEA: ICD-10-CM

## 2024-11-29 DIAGNOSIS — I10 ESSENTIAL HYPERTENSION: Primary | ICD-10-CM

## 2024-11-29 PROBLEM — J96.01 ACUTE RESPIRATORY FAILURE WITH HYPOXIA: Status: ACTIVE | Noted: 2024-11-29

## 2024-11-29 RX ORDER — METHENAMINE HIPPURATE 1000 MG/1
1 TABLET ORAL 2 TIMES DAILY WITH MEALS
Qty: 60 TABLET | Refills: 1 | Status: SHIPPED | OUTPATIENT
Start: 2024-11-29

## 2024-11-29 RX ORDER — ROSUVASTATIN CALCIUM 10 MG/1
10 TABLET, COATED ORAL DAILY
Qty: 90 TABLET | Refills: 1 | Status: SHIPPED | OUTPATIENT
Start: 2024-11-29

## 2024-11-29 RX ORDER — FLUOCINOLONE ACETONIDE 0.11 MG/ML
1 OIL AURICULAR (OTIC) 2 TIMES DAILY
Qty: 20 ML | Refills: 1 | Status: SHIPPED | OUTPATIENT
Start: 2024-11-29

## 2024-11-29 RX ORDER — KETOCONAZOLE 20 MG/G
CREAM TOPICAL
Qty: 30 G | Refills: 1 | Status: SHIPPED | OUTPATIENT
Start: 2024-11-29

## 2024-11-29 RX ORDER — KETOCONAZOLE 20 MG/ML
SHAMPOO, SUSPENSION TOPICAL
Qty: 1 EACH | Refills: 5 | Status: SHIPPED | OUTPATIENT
Start: 2024-11-29

## 2024-11-29 ASSESSMENT — ENCOUNTER SYMPTOMS
ABDOMINAL DISTENTION: 0
SORE THROAT: 0
COUGH: 0
ABDOMINAL PAIN: 0
SHORTNESS OF BREATH: 0

## 2024-11-29 NOTE — PROGRESS NOTES
Advance Care Planning   Discussed the patient’s choices for care and treatment preferences in case of a health event that adversely affects decision-making abilities or is life-limiting. Recommended the patient document care preferences in state-specific advance directives. Also reviewed the process of designating a trusted capable adult as an Agent (or Health Care Power of ) to make health care decisions for the patient if the patient becomes unable due to incapacity. Patient was asked to complete advance directive forms, if not already done, and to provide a dated, signed and witnessed (or notarized) copy, per the forms' requirements, to the practice office.    Time spent (minutes): 16 minutes     
Advance Care Planning   Discussed the patient’s choices for care and treatment preferences in case of a health event that adversely affects decision-making abilities or is life-limiting. Recommended the patient document care preferences in state-specific advance directives. Also reviewed the process of designating a trusted capable adult as an Agent (or Health Care Power of ) to make health care decisions for the patient if the patient becomes unable due to incapacity. Patient was asked to complete advance directive forms, if not already done, and to provide a dated, signed and witnessed (or notarized) copy, per the forms' requirements, to the practice office.    Time spent (minutes): {TIME; ACP time 16 min - 1 hour:[id number]}     
\"Have you been to the ER, urgent care clinic since your last visit?  Hospitalized since your last visit?\"    NO    “Have you seen or consulted any other health care providers outside our system since your last visit?”    NO             
external auditory canal.  Patient treats with eardrops and cream as noted.  Will refill these agents.    Orders:    fluocinolone (DERMOTIC) 0.01 % OIL oil; Place 1 drop in ear(s) 2 times daily    ketoconazole (NIZORAL) 2 % cream; Apply topically daily.    Seborrhea   Chronic, at goal (stable), continue current treatment plan    Orders:    ketoconazole (NIZORAL) 2 % shampoo; Use as directed daily prn    Pacemaker   Chronic, at goal (stable), continue current treatment plan         Peripheral vascular disease, unspecified (HCC)   Chronic, at goal (stable), continue current treatment plan    Orders:    rosuvastatin (CRESTOR) 10 MG tablet; Take 1 tablet by mouth daily    ACP (advance care planning)    Patient is a DNR and also a do not return to the hospital patient.         Recurrent UTI   Chronic, at goal (stable), recurrent UTI with incompetent urethra. treated multiple times in the past.    Orders:    methenamine (HIPREX) 1 g tablet; Take 1 tablet by mouth 2 times daily (with meals)      Return in about 3 months (around 2/28/2025).       Subjective   This 89-year-old female is seen today for an initial visit to establish a physician-patient relationship.  She is accompanied by her daughter, Triny Galvan, who is a CRNA.  It should also be noted that her son-in-law, Rebel Galvan, is a retired internist.  The patient lives in the assisted living facility, [facility].  She had been a hospice patient until very recently but was discharged from hospice care.  Her previous PCP has since retired.  She comes in today to establish with a new PCP.  The patient has a fairly complicated medical history which includes a history of hypertension which has been stable, a history of diabetes mellitus type 2 which has been under good control with the most recent A1c of 6.3, mitral valve stenosis with chronic atrial fibrillation for which she is maintained on Eliquis, and a history of a pacemaker inserted in 2018 for complete

## 2024-11-29 NOTE — ASSESSMENT & PLAN NOTE
Chronic, at goal (stable), continue current treatment plan    Orders:    rosuvastatin (CRESTOR) 10 MG tablet; Take 1 tablet by mouth daily

## 2024-11-29 NOTE — PATIENT INSTRUCTIONS

## 2024-11-29 NOTE — ASSESSMENT & PLAN NOTE
Chronic, at goal (stable), intervention has been discussed with the vascular surgeon but the patient is not felt to be a surgical candidate because of her age and other medical problems.    Orders:    rosuvastatin (CRESTOR) 10 MG tablet; Take 1 tablet by mouth daily

## 2024-11-29 NOTE — ASSESSMENT & PLAN NOTE
Chronic, at goal (stable), respiratory status currently stable.  Patient uses as needed albuterol.  Continue present regimen.

## 2024-11-29 NOTE — ASSESSMENT & PLAN NOTE
Chronic, at goal (stable), most recent A1c was 6.3.  Will continue present regimen of glipizide 10 mg in the morning and 5 mg in the evening.    Orders:    CBC with Auto Differential; Future    Comprehensive Metabolic Panel; Future    Lipid Panel; Future    Hemoglobin A1C; Future

## 2024-11-29 NOTE — ASSESSMENT & PLAN NOTE
Chronic, at goal (stable), continue current treatment plan blood pressure stable and well-controlled on current medication.

## 2024-11-29 NOTE — ASSESSMENT & PLAN NOTE
Chronic, at goal (stable), recurrent UTI with incompetent urethra. treated multiple times in the past.    Orders:    methenamine (HIPREX) 1 g tablet; Take 1 tablet by mouth 2 times daily (with meals)

## 2025-01-24 ENCOUNTER — OFFICE VISIT (OUTPATIENT)
Facility: CLINIC | Age: 89
End: 2025-01-24

## 2025-01-24 VITALS
TEMPERATURE: 97.1 F | DIASTOLIC BLOOD PRESSURE: 50 MMHG | HEIGHT: 56 IN | WEIGHT: 114.4 LBS | SYSTOLIC BLOOD PRESSURE: 139 MMHG | RESPIRATION RATE: 16 BRPM | OXYGEN SATURATION: 93 % | HEART RATE: 77 BPM | BODY MASS INDEX: 25.73 KG/M2

## 2025-01-24 DIAGNOSIS — H35.3211 EXUDATIVE AGE-RELATED MACULAR DEGENERATION, RIGHT EYE, WITH ACTIVE CHOROIDAL NEOVASCULARIZATION (HCC): ICD-10-CM

## 2025-01-24 DIAGNOSIS — I70.223 ATHEROSCLEROSIS OF NATIVE ARTERIES OF EXTREMITIES WITH REST PAIN, BILATERAL LEGS (HCC): ICD-10-CM

## 2025-01-24 DIAGNOSIS — J44.9 CHRONIC OBSTRUCTIVE PULMONARY DISEASE, UNSPECIFIED COPD TYPE (HCC): ICD-10-CM

## 2025-01-24 DIAGNOSIS — E11.40 TYPE 2 DIABETES MELLITUS WITH DIABETIC NEUROPATHY, WITHOUT LONG-TERM CURRENT USE OF INSULIN (HCC): ICD-10-CM

## 2025-01-24 DIAGNOSIS — I48.20 ATRIAL FIBRILLATION, CHRONIC (HCC): ICD-10-CM

## 2025-01-24 DIAGNOSIS — M79.2 NEUROPATHIC PAIN: Primary | ICD-10-CM

## 2025-01-24 RX ORDER — HYDROCODONE BITARTRATE AND ACETAMINOPHEN 5; 325 MG/1; MG/1
TABLET ORAL
COMMUNITY
Start: 2023-10-31

## 2025-01-24 RX ORDER — TRIAMCINOLONE ACETONIDE 1 MG/G
CREAM TOPICAL
COMMUNITY

## 2025-01-24 RX ORDER — PREGABALIN 25 MG/1
25 CAPSULE ORAL NIGHTLY
Qty: 60 CAPSULE | Refills: 1 | Status: SHIPPED | OUTPATIENT
Start: 2025-01-24 | End: 2025-05-24

## 2025-01-24 RX ORDER — LOPERAMIDE HYDROCHLORIDE 2 MG/1
CAPSULE ORAL
COMMUNITY

## 2025-01-24 SDOH — ECONOMIC STABILITY: FOOD INSECURITY: WITHIN THE PAST 12 MONTHS, THE FOOD YOU BOUGHT JUST DIDN'T LAST AND YOU DIDN'T HAVE MONEY TO GET MORE.: NEVER TRUE

## 2025-01-24 SDOH — ECONOMIC STABILITY: FOOD INSECURITY: WITHIN THE PAST 12 MONTHS, YOU WORRIED THAT YOUR FOOD WOULD RUN OUT BEFORE YOU GOT MONEY TO BUY MORE.: NEVER TRUE

## 2025-01-24 ASSESSMENT — PATIENT HEALTH QUESTIONNAIRE - PHQ9
SUM OF ALL RESPONSES TO PHQ QUESTIONS 1-9: 1
SUM OF ALL RESPONSES TO PHQ QUESTIONS 1-9: 1
1. LITTLE INTEREST OR PLEASURE IN DOING THINGS: NOT AT ALL
SUM OF ALL RESPONSES TO PHQ9 QUESTIONS 1 & 2: 1
SUM OF ALL RESPONSES TO PHQ QUESTIONS 1-9: 1
SUM OF ALL RESPONSES TO PHQ QUESTIONS 1-9: 1
2. FEELING DOWN, DEPRESSED OR HOPELESS: SEVERAL DAYS

## 2025-01-24 NOTE — PROGRESS NOTES
\"Have you been to the ER, urgent care clinic since your last visit?  Hospitalized since your last visit?\"    NO    “Have you seen or consulted any other health care providers outside our system since your last visit?”    YES - When: approximately 6  weeks ago.  Where and Why: [facility], cardiology.

## 2025-01-24 NOTE — PROGRESS NOTES
Comprehensive Visit:    Chief Complaint   Patient presents with    Foot Pain     Patient reports sharp pain in left leg that shoots down to her left foot    Diabetes         Assessment/Plan:       ICD-10-CM    1. Neuropathic pain  M79.2 pregabalin (LYRICA) 25 MG capsule      2. Exudative age-related macular degeneration, right eye, with active choroidal neovascularization ([facility])  H35.3211       3. Atherosclerosis of native arteries of extremities with rest pain, bilateral legs ([facility])  I70.223       4. Chronic obstructive pulmonary disease, unspecified COPD type ([facility])  J44.9       5. Atrial fibrillation, chronic ([facility])  I48.20       6. Type 2 diabetes mellitus with diabetic neuropathy, without long-term current use of insulin ([facility])  E11.40            1. Neuropathic pain  -     pregabalin (LYRICA) 25 MG capsule; Take 1 capsule by mouth at bedtime for 120 days. For nerve pain Max Daily Amount: 25 mg, Disp-60 capsule, R-1Normal  2. Exudative age-related macular degeneration, right eye, with active choroidal neovascularization (HCC)  3. Atherosclerosis of native arteries of extremities with rest pain, bilateral legs (HCC)  4. Chronic obstructive pulmonary disease, unspecified COPD type (HCC)  5. Atrial fibrillation, chronic (HCC)  6. Type 2 diabetes mellitus with diabetic neuropathy, without long-term current use of insulin ([facility])       No follow-up provider specified.       Subjective:     Amita is a 89 y.o. y.o. female who complains of   Chief Complaint   Patient presents with    Foot Pain     Patient reports sharp pain in left leg that shoots down to her left foot    Diabetes       HPI: Today's visit is an initial visit to establish a physician-patient relationship.  This 89-year-old female comes in to establish care.    She has a history of multiple medical problems including hypertension which has been under fairly good control, peripheral arterial disease which limits her ability to walk for extended periods

## 2025-01-26 PROBLEM — H91.90 HEARING LOSS: Status: ACTIVE | Noted: 2018-11-02

## 2025-01-26 PROBLEM — H35.30 MACULAR DEGENERATION: Status: ACTIVE | Noted: 2018-11-02

## 2025-01-26 PROBLEM — I05.0 MITRAL VALVE STENOSIS: Status: ACTIVE | Noted: 2023-09-05

## 2025-01-31 ENCOUNTER — PATIENT MESSAGE (OUTPATIENT)
Facility: CLINIC | Age: 89
End: 2025-01-31

## 2025-01-31 DIAGNOSIS — N39.0 CHRONIC UTI (URINARY TRACT INFECTION): Primary | ICD-10-CM

## 2025-01-31 RX ORDER — NITROFURANTOIN 25; 75 MG/1; MG/1
100 CAPSULE ORAL 2 TIMES DAILY
Qty: 30 CAPSULE | Refills: 2 | Status: SHIPPED | OUTPATIENT
Start: 2025-01-31

## 2025-01-31 NOTE — TELEPHONE ENCOUNTER
Discussed symptoms of recurrent/chronic UTI.  I made a decision to treat with Macrobid 100 mg twice daily for 5 days and then continue on a suppressive dose of Macrobid 100 mg daily thereafter.

## 2025-03-02 DIAGNOSIS — E11.9 TYPE 2 DIABETES MELLITUS WITHOUT COMPLICATION, WITHOUT LONG-TERM CURRENT USE OF INSULIN (HCC): ICD-10-CM

## 2025-03-03 RX ORDER — GLIPIZIDE 5 MG/1
TABLET ORAL
Qty: 90 TABLET | Refills: 0 | Status: SHIPPED | OUTPATIENT
Start: 2025-03-03

## 2025-03-04 DIAGNOSIS — M79.2 NEUROPATHIC PAIN: Primary | ICD-10-CM

## 2025-03-04 RX ORDER — HYDROCODONE BITARTRATE AND ACETAMINOPHEN 5; 325 MG/1; MG/1
1 TABLET ORAL 2 TIMES DAILY PRN
Qty: 30 TABLET | Refills: 0 | Status: SHIPPED | OUTPATIENT
Start: 2025-03-04 | End: 2025-04-03

## 2025-03-04 NOTE — TELEPHONE ENCOUNTER
All Conversations on this Encounter  Amita Asif  [facility] Primary Care Clinical Staff (supporting Ty Anthony MD)6 hours ago (8:38 AM)       Good morning, Dr. Anthony, I am contacting you on behalf of my mother, Amita Asif, date of birth 04/08/1926. As per your instructions, I am contacting you about a refill on Mom's pain medication. If you recall, she was prescribed Hydrocodone/Acetaminophin 5mg/325mg by Hospice after a fall last year. She only takes a pain pill occasionally but she can take it twice a day. I am down to my last 3 pills and I need to get the prescription refilled. You told me to contact you when we were close to running out, so that is what I am doing. WE use the [facility] on [address]. Could you send that prescription in for us?  If you have any questions, please feel free to contact me. My number is [phone]. It looks like the last time this med was refilled was last November.  My thanks in advance, Miriam Galvan

## 2025-03-04 NOTE — TELEPHONE ENCOUNTER
Last Appointment 1/24/2025  Next Appointment 3/24/2025    Last Urine Drug Screen; not on file   Controlled Substance Contract: not on file

## 2025-03-17 ENCOUNTER — HOSPITAL ENCOUNTER (OUTPATIENT)
Facility: HOSPITAL | Age: 89
Discharge: HOME | End: 2025-03-20
Payer: MEDICARE

## 2025-03-17 DIAGNOSIS — E11.40 TYPE 2 DIABETES MELLITUS WITH DIABETIC NEUROPATHY, WITHOUT LONG-TERM CURRENT USE OF INSULIN (HCC): ICD-10-CM

## 2025-03-17 LAB
ALBUMIN SERPL-MCNC: 3.5 G/DL (ref 3.4–5)
ALBUMIN/GLOB SERPL: 1.2 (ref 0.8–1.7)
ALP SERPL-CCNC: 87 U/L (ref 45–117)
ALT SERPL-CCNC: 35 U/L (ref 13–56)
ANION GAP SERPL CALC-SCNC: 4 MMOL/L (ref 3–18)
AST SERPL-CCNC: 38 U/L (ref 10–38)
BASOPHILS # BLD: 0.07 K/UL (ref 0–0.1)
BASOPHILS NFR BLD: 1 % (ref 0–2)
BILIRUB SERPL-MCNC: 1 MG/DL (ref 0.2–1)
BUN SERPL-MCNC: 17 MG/DL (ref 7–18)
BUN/CREAT SERPL: 18 (ref 12–20)
CALCIUM SERPL-MCNC: 10 MG/DL (ref 8.5–10.1)
CHLORIDE SERPL-SCNC: 106 MMOL/L (ref 100–111)
CHOLEST SERPL-MCNC: 143 MG/DL
CO2 SERPL-SCNC: 31 MMOL/L (ref 21–32)
CREAT SERPL-MCNC: 0.92 MG/DL (ref 0.6–1.3)
DIFFERENTIAL METHOD BLD: ABNORMAL
EOSINOPHIL # BLD: 0.22 K/UL (ref 0–0.4)
EOSINOPHIL NFR BLD: 3.2 % (ref 0–5)
ERYTHROCYTE [DISTWIDTH] IN BLOOD BY AUTOMATED COUNT: 13.5 % (ref 11.6–14.5)
EST. AVERAGE GLUCOSE BLD GHB EST-MCNC: 143 MG/DL
GLOBULIN SER CALC-MCNC: 3 G/DL (ref 2–4)
GLUCOSE SERPL-MCNC: 113 MG/DL (ref 74–99)
HBA1C MFR BLD: 6.6 % (ref 4.2–5.6)
HCT VFR BLD AUTO: 48.6 % (ref 35–45)
HDLC SERPL-MCNC: 50 MG/DL (ref 40–60)
HDLC SERPL: 2.9 (ref 0–5)
HGB BLD-MCNC: 15.5 G/DL (ref 12–16)
IMM GRANULOCYTES # BLD AUTO: 0.02 K/UL (ref 0–0.04)
IMM GRANULOCYTES NFR BLD AUTO: 0.3 % (ref 0–0.5)
LDLC SERPL CALC-MCNC: 66.6 MG/DL (ref 0–100)
LIPID PANEL: NORMAL
LYMPHOCYTES # BLD: 1.69 K/UL (ref 0.9–3.6)
LYMPHOCYTES NFR BLD: 25 % (ref 21–52)
MCH RBC QN AUTO: 30.7 PG (ref 24–34)
MCHC RBC AUTO-ENTMCNC: 31.9 G/DL (ref 31–37)
MCV RBC AUTO: 96.2 FL (ref 78–100)
MONOCYTES # BLD: 0.52 K/UL (ref 0.05–1.2)
MONOCYTES NFR BLD: 7.7 % (ref 3–10)
NEUTS SEG # BLD: 4.25 K/UL (ref 1.8–8)
NEUTS SEG NFR BLD: 62.8 % (ref 40–73)
NRBC # BLD: 0 K/UL (ref 0–0.01)
NRBC BLD-RTO: 0 PER 100 WBC
PLATELET # BLD AUTO: 140 K/UL (ref 135–420)
PMV BLD AUTO: 12.6 FL (ref 9.2–11.8)
POTASSIUM SERPL-SCNC: 4.1 MMOL/L (ref 3.5–5.5)
PROT SERPL-MCNC: 6.5 G/DL (ref 6.4–8.2)
RBC # BLD AUTO: 5.05 M/UL (ref 4.2–5.3)
SODIUM SERPL-SCNC: 141 MMOL/L (ref 136–145)
TRIGL SERPL-MCNC: 132 MG/DL
VLDLC SERPL CALC-MCNC: 26.4 MG/DL
WBC # BLD AUTO: 6.8 K/UL (ref 4.6–13.2)

## 2025-03-17 PROCEDURE — 83036 HEMOGLOBIN GLYCOSYLATED A1C: CPT

## 2025-03-17 PROCEDURE — 36415 COLL VENOUS BLD VENIPUNCTURE: CPT

## 2025-03-17 PROCEDURE — 80061 LIPID PANEL: CPT

## 2025-03-17 PROCEDURE — 85025 COMPLETE CBC W/AUTO DIFF WBC: CPT

## 2025-03-17 PROCEDURE — 80053 COMPREHEN METABOLIC PANEL: CPT

## 2025-03-19 ENCOUNTER — TELEPHONE (OUTPATIENT)
Facility: CLINIC | Age: 89
End: 2025-03-19

## 2025-03-19 NOTE — TELEPHONE ENCOUNTER
Tried reaching patient to reschedule appointment. Left vm for patient to call office to reschedule.

## 2025-03-26 ENCOUNTER — RESULTS FOLLOW-UP (OUTPATIENT)
Facility: CLINIC | Age: 89
End: 2025-03-26

## 2025-03-26 ENCOUNTER — TELEPHONE (OUTPATIENT)
Facility: CLINIC | Age: 89
End: 2025-03-26

## 2025-03-26 NOTE — TELEPHONE ENCOUNTER
Patient called with daughter, states unable to make it to her appt due to dizziness form vertigo, states that patient has taken some medication for it but has not helped, would like to know if provider suggested that they try something different, declined reschedule at this time

## 2025-03-27 DIAGNOSIS — E11.9 TYPE 2 DIABETES MELLITUS WITHOUT COMPLICATION, WITHOUT LONG-TERM CURRENT USE OF INSULIN (HCC): ICD-10-CM

## 2025-03-27 RX ORDER — GLIPIZIDE 5 MG/1
TABLET ORAL
Qty: 90 TABLET | Refills: 0 | OUTPATIENT
Start: 2025-03-27

## 2025-03-27 NOTE — TELEPHONE ENCOUNTER
Duplicate encounter.   Patient has also sent [email or web address] message in regards to this complaint.   That has been sent to the provider for review.   Will close this one for now.

## 2025-04-05 ENCOUNTER — TELEPHONE (OUTPATIENT)
Facility: CLINIC | Age: 89
End: 2025-04-05

## 2025-04-05 NOTE — TELEPHONE ENCOUNTER
Accepted a phone call from the answering service regarding patient. Officer Alen of [facility] department inquired if we were able to sign patient's death certificate.  Advised that PCP should  be able to do so.

## 2025-04-07 ENCOUNTER — TELEPHONE (OUTPATIENT)
Facility: CLINIC | Age: 89
End: 2025-04-07

## 2025-04-07 NOTE — TELEPHONE ENCOUNTER
Daughter called to advised patient has  on 2025. Daughter is inquiring if  provider can sign patient's death certificate.

## 2025-04-10 NOTE — TELEPHONE ENCOUNTER
Patient's daughter Miriam Galvan called to follow up on if Dr. Anthony has signed the patient's death certificate. She stated [facility] needs the death certificate signed before they can move forward. She also stated they can not process the patient's estate until the death certificate is signed. Miriam asked if she can be called once death certificate is signed and asked if the  home can be called so they can come and  death certificate once completed. [facility] [phone]

## 2025-04-11 NOTE — TELEPHONE ENCOUNTER
Called to check status of signed death certificate. States the  home hand delivered the death certificate to the office.    I advised we have not received any documentation and the message has been forwarded to the provider.

## 2025-04-11 NOTE — TELEPHONE ENCOUNTER
Patient's daughter Miriam Galvan called to follow up on if Dr. Anthony has signed the patient's death certificate. She stated [facility] needs the death certificate signed before they can move forward. She also stated they can not process the patient's estate until the death certificate is signed. Miriam asked if she can be called once death certificate is signed and asked if the  home can be called so they can come and  death certificate once completed. [facility] [phone]